# Patient Record
Sex: MALE | Race: WHITE | NOT HISPANIC OR LATINO | Employment: OTHER | URBAN - METROPOLITAN AREA
[De-identification: names, ages, dates, MRNs, and addresses within clinical notes are randomized per-mention and may not be internally consistent; named-entity substitution may affect disease eponyms.]

---

## 2017-02-09 ENCOUNTER — APPOINTMENT (OUTPATIENT)
Dept: CT IMAGING | Facility: CLINIC | Age: 77
End: 2017-02-09
Attending: EMERGENCY MEDICINE
Payer: MEDICARE

## 2017-02-09 ENCOUNTER — HOSPITAL ENCOUNTER (EMERGENCY)
Facility: CLINIC | Age: 77
Discharge: HOME | End: 2017-02-10
Attending: EMERGENCY MEDICINE | Admitting: EMERGENCY MEDICINE
Payer: MEDICARE

## 2017-02-09 VITALS
TEMPERATURE: 97.4 F | SYSTOLIC BLOOD PRESSURE: 143 MMHG | OXYGEN SATURATION: 96 % | HEART RATE: 70 BPM | DIASTOLIC BLOOD PRESSURE: 72 MMHG

## 2017-02-09 DIAGNOSIS — S01.01XA LACERATION OF SCALP, INITIAL ENCOUNTER: ICD-10-CM
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
## 2017-02-09 PROCEDURE — 72125 CT NECK SPINE W/O DYE: CPT

## 2017-02-09 PROCEDURE — 99284 EMERGENCY DEPT VISIT MOD MDM: CPT | Mod: 25

## 2017-02-09 PROCEDURE — 99283 EMERGENCY DEPT VISIT LOW MDM: CPT

## 2017-02-09 PROCEDURE — 99285 EMERGENCY DEPT VISIT HI MDM: CPT | Mod: 25 | Performed by: EMERGENCY MEDICINE

## 2017-02-09 PROCEDURE — 70450 CT HEAD/BRAIN W/O DYE: CPT

## 2017-02-09 PROCEDURE — 12002 RPR S/N/AX/GEN/TRNK2.6-7.5CM: CPT | Performed by: EMERGENCY MEDICINE

## 2017-02-09 PROCEDURE — 12002 RPR S/N/AX/GEN/TRNK2.6-7.5CM: CPT

## 2017-02-09 RX ORDER — LIDOCAINE HYDROCHLORIDE AND EPINEPHRINE 10; 10 MG/ML; UG/ML
1 INJECTION, SOLUTION INFILTRATION; PERINEURAL ONCE
Status: DISCONTINUED | OUTPATIENT
Start: 2017-02-09 | End: 2017-02-10 | Stop reason: HOSPADM

## 2017-02-09 RX ORDER — LIDOCAINE HYDROCHLORIDE AND EPINEPHRINE 10; 10 MG/ML; UG/ML
INJECTION, SOLUTION INFILTRATION; PERINEURAL
Status: DISCONTINUED
Start: 2017-02-09 | End: 2017-02-10 | Stop reason: HOSPADM

## 2017-02-09 ASSESSMENT — ENCOUNTER SYMPTOMS
WOUND: 1
BRUISES/BLEEDS EASILY: 0
WEAKNESS: 0
CONFUSION: 0
VOMITING: 0
ABDOMINAL PAIN: 0
NUMBNESS: 0
CHEST TIGHTNESS: 0
NECK PAIN: 1
PHOTOPHOBIA: 0
BACK PAIN: 0
APPETITE CHANGE: 0
NAUSEA: 0
LIGHT-HEADEDNESS: 0
HEADACHES: 0
DIZZINESS: 0
SHORTNESS OF BREATH: 0

## 2017-02-09 NOTE — ED AVS SNAPSHOT
[facility] Emergency Department    [address]    [facility] [address]    Phone:  [phone]    Fax:  [phone]                                       Bryson Mendez   MRN: [id number]    Department:  [facility] Emergency Department   Date of Visit:  2/9/2017           Patient Information     Date Of Birth          1940        Your diagnoses for this visit were:     Fall, initial encounter     Laceration of scalp, initial encounter        You were seen by Tolu White MD.      Follow-up Information     Follow up with Thien Davison MD. Schedule an appointment as soon as possible for a visit in 10 days.    Specialty:  Family Practice    Why:  For suture removal    Contact information:    [facility]  [address] [id number] [phone]          Discharge Instructions         Mechanical Fall  You have had a fall today. It appears that the cause is  mechanical . That means that you slipped, tripped or lost your balance. If your fall had been due to fainting or a seizure, further tests would be required.  Home Care:  1. Rest today and resume your normal activities when you are feeling back to normal.  2. If you were injured during the fall, follow the advice from your doctor regarding care of your injury.  3. You may use acetaminophen (Tylenol) or ibuprofen (Motrin, Advil) to control pain, unless another pain medicine was prescribed. [NOTE: If you have chronic liver or kidney disease or ever had a stomach ulcer or GI bleeding, talk with your doctor before using these medicines.]  Fall Prevention:    Was there anything that caused your fall that can be fixed, removed, or replaced?    Make your home safe by keeping walkways clear of objects you may trip over.    Use non-slip pads under rugs.    Do not walk in poorly lit areas.    Do not stand on chairs or wobbly ladders.    Use caution when reaching overhead or looking upward. This position can cause a loss  of balance.    Be sure your shoes fit properly, have non-slip bottoms and are in good condition.    Be cautious when going up and down curbs, and walking on uneven sidewalks.    If your balance is poor, consider using a cane or walker.    Stay as active as you can. Balance, flexibility, strength, and endurance all come from exercise. They all play a role in preventing falls.  Follow Up  with your doctor or as advised by our staff.  Get Prompt Medical Attention  if any of the following occur:    Repeated mechanical falls, or unexplained falls    Dizziness, fainting or seizure    Severe headache    Chest pain or shortness of breath    Palpitations (very rapid or very slow or irregular heartbeat)    Blood in vomit, stools (black or red color)    Weakness of an arm or leg or one side of the face    Difficulty with speech or vision    7789-8978 [employer]. [address] [place] [address]. All rights reserved. This information is not intended as a substitute for professional medical care. Always follow your healthcare professional's instructions.          Discharge References/Attachments     LACERATION, SCALP: SUTURES OR STAPLES (ENGLISH)      24 Hour Appointment Hotline       To make an appointment at any [facility], call [phone] ([phone]). If you don't have a family doctor or clinic, we will help you find one. [facility] clinics are conveniently located to serve the needs of you and your family.             Review of your medicines      Our records show that you are taking the medicines listed below. If these are incorrect, please call your family doctor or clinic.        Dose / Directions Last dose taken    ASPIRIN PO   Dose:  81 mg        Take 81 mg by mouth daily   Refills:  0        furosemide 40 MG tablet   Commonly known as:  LASIX   Dose:  40 mg   Quantity:  30 tablet        Take 1 tablet (40 mg) by mouth daily   Refills:  1        losartan 50 MG tablet   Commonly known  as:  COZAAR   Dose:  50 mg        Take 50 mg by mouth daily.   Refills:  0        metoprolol 100 MG tablet   Commonly known as:  LOPRESSOR   Dose:  100 mg   Quantity:  60 tablet        Take 1 tablet (100 mg) by mouth 2 times daily   Refills:  0        * PRESERVISION/LUTEIN PO   Dose:  1 capsule        Take 1 capsule by mouth 2 times daily   Refills:  0        * MULTIVITAL PO   Dose:  1 tablet        Take 1 tablet by mouth daily.   Refills:  0        nitroglycerin 0.4 MG sublingual tablet   Commonly known as:  NITROSTAT   Dose:  0.4 mg   Quantity:  25 tablet        Place 1 tablet (0.4 mg) under the tongue every 5 minutes as needed for chest pain   Refills:  0        NORVASC 5 MG tablet   Dose:  5 mg   Generic drug:  amLODIPine        Take 5 mg by mouth daily.   Refills:  0        OMEGA 3 PO        Refills:  0        OVER-THE-COUNTER   Dose:  2 tablet        2 tablets daily Tebs for eyes.   Refills:  0        VIAGRA 100 MG cap/tab   Generic drug:  sildenafil        Refills:  2        VITAMIN D3 PO   Dose:  5000 Units        Take 5,000 Units by mouth every other day   Refills:  0        ZOCOR 20 MG tablet   Dose:  20 mg   Generic drug:  simvastatin        Take 20 mg by mouth At Bedtime.   Refills:  0        * Notice:  This list has 2 medication(s) that are the same as other medications prescribed for you. Read the directions carefully, and ask your doctor or other care provider to review them with you.            Procedures and tests performed during your visit     CT Head w/o Contrast    Cervical spine CT w/o contrast    Laceration repair      Orders Needing Specimen Collection     None      Pending Results     Date and Time Order Name Status Description    2/9/2017 2316 Cervical spine CT w/o contrast Preliminary     2/9/2017 2316 CT Head w/o Contrast Preliminary             Pending Culture Results     No orders found for last 2 day(s).       Test Results from your hospital stay           2/9/2017 11:44 PM -  Interface, Radiant Ib      Narrative     CT HEAD W/O CONTRAST  2/9/2017 11:34 PM      HISTORY: Fall. Head injury.    TECHNIQUE: Routine noncontrast head CT. Radiation dose for this scan  was reduced using automated exposure control, adjustment of the mA  and/or kV according to patient size, or iterative reconstruction  technique.    COMPARISON: None.    FINDINGS: There is generalized brain atrophy. No mass, mass effect or  intracranial hemorrhage. No evidence of acute infarct. Periventricular  low attenuation is consistent with chronic small vessel ischemic  disease. There is a right parietal scalp hematoma. No fracture.  Ethmoid and sphenoid sinus disease.        Impression     IMPRESSION: No acute brain abnormality.         2/9/2017 11:56 PM - Interface, Radiant Ib      Narrative     CT CERVICAL SPINE W/O CONTRAST  2/9/2017 11:34 PM      HISTORY: Neck pain after fall.    TECHNIQUE: Multiplanar imaging of the cervical spine without  intravenous contrast. Radiation dose for this scan was reduced using  automated exposure control, adjustment of the mA and/or kV according  to patient size, or iterative reconstruction technique.     COMPARISON: None.    FINDINGS: There is no acute fracture or traumatic malalignment. There  is extensive multilevel degenerative disc and facet disease. There are  anterior flowing osteophytes extending from C5-C7. There is mild  spinal stenosis at C3-C4. No other significant spinal stenosis. The  paraspinal soft tissues are remarkable for atherosclerotic  calcifications. Mild scarring at the lung apices.        Impression     IMPRESSION: No acute traumatic abnormality. Multilevel degenerative  disease.                Thank you for choosing [facility]       Thank you for choosing [facility] for your care. Our goal is always to provide you with excellent care. Hearing back from our patients is one way we can continue to improve our services. Please take a few minutes to complete the written  "survey that you may receive in the mail after you visit with us. Thank you!        [employer] Information     [employer] lets you send messages to your doctor, view your test results, renew your prescriptions, schedule appointments and more. To sign up, go to [email or web address] . Click on \"Log in\" on the left side of the screen, which will take you to the Welcome page. Then click on \"Sign up Now\" on the right side of the page.     You will be asked to enter the access code listed below, as well as some personal information. Please follow the directions to create your username and password.     Your access code is: [email or web address]  Expires: 2017 12:14 AM     Your access code will  in 90 days. If you need help or a new code, please call your [facility] clinic or [phone].        Care EveryWhere ID     This is your Care EveryWhere ID. This could be used by other organizations to access your [facility] medical records  [id number]        After Visit Summary       This is your record. Keep this with you and show to your community pharmacist(s) and doctor(s) at your next visit.                  "

## 2017-02-09 NOTE — ED AVS SNAPSHOT
[facility] Emergency Department    [address] [address]    Phone:  [phone]    Fax:  [phone]                                       Bryson Mendez   MRN: [id number]    Department:  [facility] Emergency Department   Date of Visit:  2/9/2017           After Visit Summary Signature Page     I have received my discharge instructions, and my questions have been answered. I have discussed any challenges I see with this plan with the nurse or doctor.    ..........................................................................................................................................  Patient/Patient Representative Signature      ..........................................................................................................................................  Patient Representative Print Name and Relationship to Patient    ..................................................               ................................................  Date                                            Time    ..........................................................................................................................................  Reviewed by Signature/Title    ...................................................              ..............................................  Date                                                            Time

## 2017-02-10 NOTE — DISCHARGE INSTRUCTIONS
Mechanical Fall  You have had a fall today. It appears that the cause is  mechanical . That means that you slipped, tripped or lost your balance. If your fall had been due to fainting or a seizure, further tests would be required.  Home Care:  1. Rest today and resume your normal activities when you are feeling back to normal.  2. If you were injured during the fall, follow the advice from your doctor regarding care of your injury.  3. You may use acetaminophen (Tylenol) or ibuprofen (Motrin, Advil) to control pain, unless another pain medicine was prescribed. [NOTE: If you have chronic liver or kidney disease or ever had a stomach ulcer or GI bleeding, talk with your doctor before using these medicines.]  Fall Prevention:    Was there anything that caused your fall that can be fixed, removed, or replaced?    Make your home safe by keeping walkways clear of objects you may trip over.    Use non-slip pads under rugs.    Do not walk in poorly lit areas.    Do not stand on chairs or wobbly ladders.    Use caution when reaching overhead or looking upward. This position can cause a loss of balance.    Be sure your shoes fit properly, have non-slip bottoms and are in good condition.    Be cautious when going up and down curbs, and walking on uneven sidewalks.    If your balance is poor, consider using a cane or walker.    Stay as active as you can. Balance, flexibility, strength, and endurance all come from exercise. They all play a role in preventing falls.  Follow Up  with your doctor or as advised by our staff.  Get Prompt Medical Attention  if any of the following occur:    Repeated mechanical falls, or unexplained falls    Dizziness, fainting or seizure    Severe headache    Chest pain or shortness of breath    Palpitations (very rapid or very slow or irregular heartbeat)    Blood in vomit, stools (black or red color)    Weakness of an arm or leg or one side of the face    Difficulty with speech or vision     0260-9751 [employer]. [address] [place] [address], [place] [address]. All rights reserved. This information is not intended as a substitute for professional medical care. Always follow your healthcare professional's instructions.

## 2017-02-10 NOTE — ED PROVIDER NOTES
History     Chief Complaint   Patient presents with     Fall     pt fell and hit head on glass table, laceration to top of head, no loc, no neck pain, admits to ETOH tonight,      HPI  Bryson Mendez is a 76 year old male With history of osteoarthritis A. Fib ( on aspirin), and coronary artery disease presents for evaluation of head injury. Patient states he was spending time with friends and had a few drinks tonight when he tripped and fell, striking the back of his head on a table.  No loss of consciousness.  Patient was able to get himself up immediately but did suffer a laceration to the back of his head.  Due to the bleeding, a friend brought him to the emergency department for evaluation.Patient denies headache.  Patient does complain of some mild left-sided neck pain.  Denies chest pain difficulty breathing, abdominal pain, nausea, back pain, hip pain, or knee pain. Patient denies preceding lightheadedness or dizziness    I have reviewed the Medications, Allergies, Past Medical and Surgical History, and Social History in the Epic system.    Review of Systems   Constitutional: Negative for appetite change.   HENT: Negative for congestion.    Eyes: Negative for photophobia and visual disturbance.   Respiratory: Negative for chest tightness and shortness of breath.    Cardiovascular: Negative for chest pain.   Gastrointestinal: Negative for nausea, vomiting and abdominal pain.   Musculoskeletal: Positive for neck pain. Negative for back pain.   Skin: Positive for wound (head laceration).   Neurological: Negative for dizziness, weakness, light-headedness, numbness and headaches.   Hematological: Does not bruise/bleed easily.   Psychiatric/Behavioral: Negative for confusion.   All other systems reviewed and are negative.      Physical Exam   BP: 158/79 mmHg  Pulse: 70  Temp: 97.4  F (36.3  C)  SpO2: 94 %  Physical Exam   Constitutional: He is oriented to person, place, and time. He appears well-developed and  well-nourished. No distress.   HENT:   Head: Normocephalic. Head is with laceration.       Eyes: Conjunctivae and EOM are normal. Pupils are equal, round, and reactive to light.   Neck: No spinous process tenderness present.   Cardiovascular: Normal rate and regular rhythm.    Pulmonary/Chest: Effort normal and breath sounds normal.   Abdominal: Soft.   Musculoskeletal: Normal range of motion. He exhibits no tenderness.   Neurological: He is alert and oriented to person, place, and time.   No slurring speech. Alert and oriented and answering questions appropriately.  No clinical evidence of intoxication at this time   Skin: Skin is warm and dry. He is not diaphoretic.   Psychiatric: He has a normal mood and affect.   Nursing note and vitals reviewed.      ED Course   Laceration repair  Date/Time: 2/9/2017 11:47 PM  Performed by: LORI CORDERO  Authorized by: LORI CORDERO  Consent: Verbal consent obtained.  Risks and benefits: risks, benefits and alternatives were discussed  Consent given by: patient  Body area: head/neck  Location details: scalp  Laceration length: 5 cm  Foreign bodies: no foreign bodies  Tendon involvement: none  Nerve involvement: none  Vascular damage: no  Anesthesia: local infiltration  Local anesthetic: lidocaine 1% with epinephrine  Anesthetic total: 3 ml  Patient sedated: no  Preparation: Patient was prepped and draped in the usual sterile fashion.  Irrigation solution: saline  Irrigation method: syringe  Amount of cleaning: standard  Debridement: none  Degree of undermining: none  Skin closure: 5-0 nylon  Number of sutures: 6  Technique: simple  Approximation: close  Approximation difficulty: simple  Dressing: antibiotic ointment  Patient tolerance: Patient tolerated the procedure well with no immediate complications                     Labs Ordered and Resulted from Time of ED Arrival Up to the Time of Departure from the ED - No data to display    Assessments & Plan  (with Medical Decision Making)  6-year-old male with history of A. Fib on aspirin presents for evaluation after a mechanical fall with laceration to the back of the scalp.No loss of consciousness. No headache. Given patient's age and use of chronic aspirin, as well as his recent use of alcohol, a CT of the head and cervical spine were performed to evaluate for underlying bleeding or fracture: CT showed multilevel degenerative changes in the cervical spine without acute frcture. CT head without acute fracture. Laceration repaired as above.  Patient advised to follow-up in 7-10 days for suture removal.     I have reviewed the nursing notes.    I have reviewed the findings, diagnosis, plan and need for follow up with the patient.    New Prescriptions    No medications on file       Final diagnoses:   Fall, initial encounter   Laceration of scalp, initial encounter       2/9/2017   [facility] EMERGENCY DEPARTMENT      White, Tolu Palacio MD  02/10/17 0020

## 2017-02-10 NOTE — ED NOTES
Pt states he tripped and fell onto table hitting back of head - denies LOC - has lac to occ - bleeding controlled prior to arrival - admits to having friends over this evening and drinking etoh - hx of knee problems and hip issues . Denies any headache or nausea - does admit to slight discomfort right shoulder radiating to base of neck - declines collar at this time.; Patient encouraged not to move neck.

## 2017-02-10 NOTE — ED NOTES
pt fell and hit head on glass table, laceration to top of head, no loc, no neck pain, admits to ETOH tonight,

## 2017-11-13 ENCOUNTER — TRANSFERRED RECORDS (OUTPATIENT)
Dept: HEALTH INFORMATION MANAGEMENT | Facility: CLINIC | Age: 77
End: 2017-11-13

## 2018-01-14 ENCOUNTER — TRANSFERRED RECORDS (OUTPATIENT)
Dept: HEALTH INFORMATION MANAGEMENT | Facility: CLINIC | Age: 78
End: 2018-01-14

## 2018-01-16 RX ORDER — MELOXICAM 15 MG/1
15 TABLET ORAL DAILY
Status: ON HOLD | COMMUNITY
Start: 2016-11-08 | End: 2018-04-25

## 2018-01-16 RX ORDER — OMEPRAZOLE 40 MG/1
40 CAPSULE, DELAYED RELEASE ORAL DAILY
Status: ON HOLD | COMMUNITY
Start: 2016-09-26 | End: 2020-09-06

## 2018-01-16 RX ORDER — OXYBUTYNIN CHLORIDE 5 MG/1
10 TABLET, EXTENDED RELEASE ORAL DAILY
COMMUNITY
Start: 2017-01-31 | End: 2020-09-05

## 2018-01-17 ENCOUNTER — ANESTHESIA EVENT (OUTPATIENT)
Dept: SURGERY | Facility: CLINIC | Age: 78
DRG: 467 | End: 2018-01-17
Payer: MEDICARE

## 2018-01-18 ASSESSMENT — LIFESTYLE VARIABLES: TOBACCO_USE: 1
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
## 2018-01-19 ENCOUNTER — HOSPITAL ENCOUNTER (INPATIENT)
Facility: CLINIC | Age: 78
LOS: 6 days | Discharge: HOME | DRG: 467 | End: 2018-01-25
Attending: ORTHOPAEDIC SURGERY | Admitting: ORTHOPAEDIC SURGERY
Payer: MEDICARE

## 2018-01-19 ENCOUNTER — APPOINTMENT (OUTPATIENT)
Dept: GENERAL RADIOLOGY | Facility: CLINIC | Age: 78
DRG: 467 | End: 2018-01-19
Attending: ORTHOPAEDIC SURGERY
Payer: MEDICARE

## 2018-01-19 ENCOUNTER — ANESTHESIA (OUTPATIENT)
Dept: SURGERY | Facility: CLINIC | Age: 78
DRG: 467 | End: 2018-01-19
Payer: MEDICARE

## 2018-01-19 DIAGNOSIS — Z96.649 S/P REVISION OF TOTAL HIP: Primary | ICD-10-CM

## 2018-01-19 DIAGNOSIS — I25.119 CORONARY ARTERY DISEASE INVOLVING NATIVE HEART WITH ANGINA PECTORIS, UNSPECIFIED VESSEL OR LESION TYPE (H): Chronic | ICD-10-CM

## 2018-01-19 PROBLEM — I10 BENIGN ESSENTIAL HYPERTENSION: Status: ACTIVE | Noted: 2018-01-19

## 2018-01-19 PROBLEM — T84.018A FAILED TOTAL HIP ARTHROPLASTY (H): Status: ACTIVE | Noted: 2018-01-19

## 2018-01-19 LAB
CREAT SERPL-MCNC: 0.68 MG/DL (ref 0.66–1.25)
GFR SERPL CREATININE-BSD FRML MDRD: >90 ML/MIN/1.7M2
PLATELET # BLD AUTO: 118 10E9/L (ref 150–450)

## 2018-01-19 PROCEDURE — 0SRR01A REPLACEMENT OF RIGHT HIP JOINT, FEMORAL SURFACE WITH METAL SYNTHETIC SUBSTITUTE, UNCEMENTED, OPEN APPROACH: ICD-10-PCS | Performed by: ORTHOPAEDIC SURGERY

## 2018-01-19 PROCEDURE — 85049 AUTOMATED PLATELET COUNT: CPT | Performed by: ORTHOPAEDIC SURGERY

## 2018-01-19 PROCEDURE — 12000000 ZZH R&B MED SURG/OB

## 2018-01-19 PROCEDURE — 25000132 ZZH RX MED GY IP 250 OP 250 PS 637: Mod: GY | Performed by: FAMILY MEDICINE

## 2018-01-19 PROCEDURE — 27110028 ZZH OR GENERAL SUPPLY NON-STERILE: Performed by: ORTHOPAEDIC SURGERY

## 2018-01-19 PROCEDURE — 25000128 H RX IP 250 OP 636: Performed by: NURSE ANESTHETIST, CERTIFIED REGISTERED

## 2018-01-19 PROCEDURE — 25000566 ZZH SEVOFLURANE, EA 15 MIN: Performed by: ORTHOPAEDIC SURGERY

## 2018-01-19 PROCEDURE — 25000125 ZZHC RX 250: Performed by: ORTHOPAEDIC SURGERY

## 2018-01-19 PROCEDURE — 25000125 ZZHC RX 250: Performed by: NURSE ANESTHETIST, CERTIFIED REGISTERED

## 2018-01-19 PROCEDURE — A9270 NON-COVERED ITEM OR SERVICE: HCPCS | Mod: GY | Performed by: ORTHOPAEDIC SURGERY

## 2018-01-19 PROCEDURE — 37000009 ZZH ANESTHESIA TECHNICAL FEE, EACH ADDTL 15 MIN: Performed by: ORTHOPAEDIC SURGERY

## 2018-01-19 PROCEDURE — 36000069 ZZH SURGERY LEVEL 5 EA 15 ADDTL MIN: Performed by: ORTHOPAEDIC SURGERY

## 2018-01-19 PROCEDURE — 40000986 XR PELVIS PORT 1/2 VW

## 2018-01-19 PROCEDURE — 36000067 ZZH SURGERY LEVEL 5 1ST 30 MIN: Performed by: ORTHOPAEDIC SURGERY

## 2018-01-19 PROCEDURE — 25000128 H RX IP 250 OP 636: Performed by: ORTHOPAEDIC SURGERY

## 2018-01-19 PROCEDURE — C1776 JOINT DEVICE (IMPLANTABLE): HCPCS | Performed by: ORTHOPAEDIC SURGERY

## 2018-01-19 PROCEDURE — 25000132 ZZH RX MED GY IP 250 OP 250 PS 637: Mod: GY | Performed by: PHYSICIAN ASSISTANT

## 2018-01-19 PROCEDURE — 82565 ASSAY OF CREATININE: CPT | Performed by: ORTHOPAEDIC SURGERY

## 2018-01-19 PROCEDURE — A9270 NON-COVERED ITEM OR SERVICE: HCPCS | Mod: GY | Performed by: PHYSICIAN ASSISTANT

## 2018-01-19 PROCEDURE — 71000012 ZZH RECOVERY PHASE 1 LEVEL 1 FIRST HR: Performed by: ORTHOPAEDIC SURGERY

## 2018-01-19 PROCEDURE — 36415 COLL VENOUS BLD VENIPUNCTURE: CPT | Performed by: ORTHOPAEDIC SURGERY

## 2018-01-19 PROCEDURE — 0SUA09Z SUPPLEMENT RIGHT HIP JOINT, ACETABULAR SURFACE WITH LINER, OPEN APPROACH: ICD-10-PCS | Performed by: ORTHOPAEDIC SURGERY

## 2018-01-19 PROCEDURE — 25000132 ZZH RX MED GY IP 250 OP 250 PS 637: Mod: GY | Performed by: ORTHOPAEDIC SURGERY

## 2018-01-19 PROCEDURE — 27210995 ZZH RX 272: Performed by: ORTHOPAEDIC SURGERY

## 2018-01-19 PROCEDURE — A9270 NON-COVERED ITEM OR SERVICE: HCPCS | Mod: GY | Performed by: FAMILY MEDICINE

## 2018-01-19 PROCEDURE — 27210794 ZZH OR GENERAL SUPPLY STERILE: Performed by: ORTHOPAEDIC SURGERY

## 2018-01-19 PROCEDURE — 0SP909Z REMOVAL OF LINER FROM RIGHT HIP JOINT, OPEN APPROACH: ICD-10-PCS | Performed by: ORTHOPAEDIC SURGERY

## 2018-01-19 PROCEDURE — 40000305 ZZH STATISTIC PRE PROC ASSESS I: Performed by: ORTHOPAEDIC SURGERY

## 2018-01-19 PROCEDURE — 25000128 H RX IP 250 OP 636: Performed by: PHYSICIAN ASSISTANT

## 2018-01-19 PROCEDURE — 37000008 ZZH ANESTHESIA TECHNICAL FEE, 1ST 30 MIN: Performed by: ORTHOPAEDIC SURGERY

## 2018-01-19 PROCEDURE — 0SPR0JZ REMOVAL OF SYNTHETIC SUBSTITUTE FROM RIGHT HIP JOINT, FEMORAL SURFACE, OPEN APPROACH: ICD-10-PCS | Performed by: ORTHOPAEDIC SURGERY

## 2018-01-19 PROCEDURE — 25800025 ZZH RX 258: Performed by: ORTHOPAEDIC SURGERY

## 2018-01-19 DEVICE — IMP HEAD FEMORAL DEPUY 36MM +5 1365-52-000: Type: IMPLANTABLE DEVICE | Site: HIP | Status: FUNCTIONAL

## 2018-01-19 DEVICE — IMPLANTABLE DEVICE: Type: IMPLANTABLE DEVICE | Site: HIP | Status: FUNCTIONAL

## 2018-01-19 DEVICE — IMP INSERT HIP DEPUY PINNACLE ALTRX 36X52MM 1221-36-052: Type: IMPLANTABLE DEVICE | Site: HIP | Status: FUNCTIONAL

## 2018-01-19 RX ORDER — ACETAMINOPHEN 325 MG/1
975 TABLET ORAL EVERY 8 HOURS
Status: DISPENSED | OUTPATIENT
Start: 2018-01-19 | End: 2018-01-22

## 2018-01-19 RX ORDER — LIDOCAINE HYDROCHLORIDE 10 MG/ML
INJECTION, SOLUTION INFILTRATION; PERINEURAL PRN
Status: DISCONTINUED | OUTPATIENT
Start: 2018-01-19 | End: 2018-01-19

## 2018-01-19 RX ORDER — DEXTROSE MONOHYDRATE, SODIUM CHLORIDE, AND POTASSIUM CHLORIDE 50; 1.49; 4.5 G/1000ML; G/1000ML; G/1000ML
INJECTION, SOLUTION INTRAVENOUS CONTINUOUS
Status: DISCONTINUED | OUTPATIENT
Start: 2018-01-19 | End: 2018-01-20 | Stop reason: CLARIF

## 2018-01-19 RX ORDER — EPINEPHRINE 1 MG/ML
INJECTION, SOLUTION, CONCENTRATE INTRAVENOUS PRN
Status: DISCONTINUED | OUTPATIENT
Start: 2018-01-19 | End: 2018-01-19

## 2018-01-19 RX ORDER — NALOXONE HYDROCHLORIDE 0.4 MG/ML
.1-.4 INJECTION, SOLUTION INTRAMUSCULAR; INTRAVENOUS; SUBCUTANEOUS
Status: DISCONTINUED | OUTPATIENT
Start: 2018-01-19 | End: 2018-01-25 | Stop reason: HOSPADM

## 2018-01-19 RX ORDER — NEOSTIGMINE METHYLSULFATE 1 MG/ML
VIAL (ML) INJECTION PRN
Status: DISCONTINUED | OUTPATIENT
Start: 2018-01-19 | End: 2018-01-19

## 2018-01-19 RX ORDER — GLYCOPYRROLATE 0.2 MG/ML
INJECTION, SOLUTION INTRAMUSCULAR; INTRAVENOUS PRN
Status: DISCONTINUED | OUTPATIENT
Start: 2018-01-19 | End: 2018-01-19

## 2018-01-19 RX ORDER — FENTANYL CITRATE 50 UG/ML
INJECTION, SOLUTION INTRAMUSCULAR; INTRAVENOUS PRN
Status: DISCONTINUED | OUTPATIENT
Start: 2018-01-19 | End: 2018-01-19

## 2018-01-19 RX ORDER — PROCHLORPERAZINE MALEATE 5 MG
5 TABLET ORAL EVERY 6 HOURS PRN
Status: DISCONTINUED | OUTPATIENT
Start: 2018-01-19 | End: 2018-01-25 | Stop reason: HOSPADM

## 2018-01-19 RX ORDER — LIDOCAINE 40 MG/G
CREAM TOPICAL
Status: DISCONTINUED | OUTPATIENT
Start: 2018-01-19 | End: 2018-01-19 | Stop reason: HOSPADM

## 2018-01-19 RX ORDER — HYDROXYZINE HYDROCHLORIDE 50 MG/1
50 TABLET, FILM COATED ORAL
Status: DISCONTINUED | OUTPATIENT
Start: 2018-01-19 | End: 2018-01-19 | Stop reason: HOSPADM

## 2018-01-19 RX ORDER — NALOXONE HYDROCHLORIDE 0.4 MG/ML
.1-.4 INJECTION, SOLUTION INTRAMUSCULAR; INTRAVENOUS; SUBCUTANEOUS
Status: DISCONTINUED | OUTPATIENT
Start: 2018-01-19 | End: 2018-01-19

## 2018-01-19 RX ORDER — AMOXICILLIN 250 MG
1-2 CAPSULE ORAL 2 TIMES DAILY
Status: DISCONTINUED | OUTPATIENT
Start: 2018-01-19 | End: 2018-01-25 | Stop reason: HOSPADM

## 2018-01-19 RX ORDER — KETOROLAC TROMETHAMINE 30 MG/ML
INJECTION, SOLUTION INTRAMUSCULAR; INTRAVENOUS PRN
Status: DISCONTINUED | OUTPATIENT
Start: 2018-01-19 | End: 2018-01-19

## 2018-01-19 RX ORDER — SODIUM CHLORIDE, SODIUM LACTATE, POTASSIUM CHLORIDE, CALCIUM CHLORIDE 600; 310; 30; 20 MG/100ML; MG/100ML; MG/100ML; MG/100ML
INJECTION, SOLUTION INTRAVENOUS CONTINUOUS
Status: DISCONTINUED | OUTPATIENT
Start: 2018-01-19 | End: 2018-01-19 | Stop reason: HOSPADM

## 2018-01-19 RX ORDER — ONDANSETRON 2 MG/ML
4 INJECTION INTRAMUSCULAR; INTRAVENOUS EVERY 30 MIN PRN
Status: DISCONTINUED | OUTPATIENT
Start: 2018-01-19 | End: 2018-01-19 | Stop reason: HOSPADM

## 2018-01-19 RX ORDER — MEPERIDINE HYDROCHLORIDE 25 MG/ML
12.5 INJECTION INTRAMUSCULAR; INTRAVENOUS; SUBCUTANEOUS EVERY 5 MIN PRN
Status: DISCONTINUED | OUTPATIENT
Start: 2018-01-19 | End: 2018-01-19 | Stop reason: HOSPADM

## 2018-01-19 RX ORDER — ONDANSETRON 4 MG/1
4 TABLET, ORALLY DISINTEGRATING ORAL EVERY 30 MIN PRN
Status: DISCONTINUED | OUTPATIENT
Start: 2018-01-19 | End: 2018-01-19 | Stop reason: HOSPADM

## 2018-01-19 RX ORDER — HYDROMORPHONE HYDROCHLORIDE 1 MG/ML
.3-.5 INJECTION, SOLUTION INTRAMUSCULAR; INTRAVENOUS; SUBCUTANEOUS
Status: DISCONTINUED | OUTPATIENT
Start: 2018-01-19 | End: 2018-01-24

## 2018-01-19 RX ORDER — ONDANSETRON 2 MG/ML
INJECTION INTRAMUSCULAR; INTRAVENOUS PRN
Status: DISCONTINUED | OUTPATIENT
Start: 2018-01-19 | End: 2018-01-19

## 2018-01-19 RX ORDER — FENTANYL CITRATE 50 UG/ML
25-50 INJECTION, SOLUTION INTRAMUSCULAR; INTRAVENOUS
Status: DISCONTINUED | OUTPATIENT
Start: 2018-01-19 | End: 2018-01-19 | Stop reason: HOSPADM

## 2018-01-19 RX ORDER — HYDROMORPHONE HYDROCHLORIDE 1 MG/ML
.3-.5 INJECTION, SOLUTION INTRAMUSCULAR; INTRAVENOUS; SUBCUTANEOUS EVERY 5 MIN PRN
Status: DISCONTINUED | OUTPATIENT
Start: 2018-01-19 | End: 2018-01-19 | Stop reason: HOSPADM

## 2018-01-19 RX ORDER — ACETAMINOPHEN 325 MG/1
650 TABLET ORAL EVERY 4 HOURS PRN
Status: DISCONTINUED | OUTPATIENT
Start: 2018-01-22 | End: 2018-01-25 | Stop reason: HOSPADM

## 2018-01-19 RX ORDER — ONDANSETRON 2 MG/ML
4 INJECTION INTRAMUSCULAR; INTRAVENOUS EVERY 6 HOURS PRN
Status: DISCONTINUED | OUTPATIENT
Start: 2018-01-19 | End: 2018-01-25 | Stop reason: HOSPADM

## 2018-01-19 RX ORDER — CEFAZOLIN SODIUM 2 G/100ML
2 INJECTION, SOLUTION INTRAVENOUS
Status: COMPLETED | OUTPATIENT
Start: 2018-01-19 | End: 2018-01-19

## 2018-01-19 RX ORDER — CEFAZOLIN SODIUM 2 G/100ML
2 INJECTION, SOLUTION INTRAVENOUS EVERY 8 HOURS
Status: COMPLETED | OUTPATIENT
Start: 2018-01-19 | End: 2018-01-20

## 2018-01-19 RX ORDER — HYDROMORPHONE HYDROCHLORIDE 2 MG/1
2-4 TABLET ORAL EVERY 4 HOURS PRN
Status: DISCONTINUED | OUTPATIENT
Start: 2018-01-19 | End: 2018-01-25 | Stop reason: HOSPADM

## 2018-01-19 RX ORDER — LIDOCAINE 40 MG/G
CREAM TOPICAL
Status: DISCONTINUED | OUTPATIENT
Start: 2018-01-19 | End: 2018-01-25 | Stop reason: HOSPADM

## 2018-01-19 RX ORDER — LOSARTAN POTASSIUM 50 MG/1
50 TABLET ORAL DAILY
Status: DISCONTINUED | OUTPATIENT
Start: 2018-01-19 | End: 2018-01-25 | Stop reason: HOSPADM

## 2018-01-19 RX ORDER — AMLODIPINE BESYLATE 5 MG/1
5 TABLET ORAL DAILY
Status: DISCONTINUED | OUTPATIENT
Start: 2018-01-20 | End: 2018-01-20

## 2018-01-19 RX ORDER — PROPOFOL 10 MG/ML
INJECTION, EMULSION INTRAVENOUS PRN
Status: DISCONTINUED | OUTPATIENT
Start: 2018-01-19 | End: 2018-01-19

## 2018-01-19 RX ORDER — OXYCODONE HCL 10 MG/1
10 TABLET, FILM COATED, EXTENDED RELEASE ORAL ONCE
Status: COMPLETED | OUTPATIENT
Start: 2018-01-19 | End: 2018-01-19

## 2018-01-19 RX ORDER — OXYBUTYNIN CHLORIDE 5 MG/1
10 TABLET, EXTENDED RELEASE ORAL DAILY
Status: DISCONTINUED | OUTPATIENT
Start: 2018-01-20 | End: 2018-01-25 | Stop reason: HOSPADM

## 2018-01-19 RX ORDER — DEXAMETHASONE SODIUM PHOSPHATE 4 MG/ML
INJECTION, SOLUTION INTRA-ARTICULAR; INTRALESIONAL; INTRAMUSCULAR; INTRAVENOUS; SOFT TISSUE PRN
Status: DISCONTINUED | OUTPATIENT
Start: 2018-01-19 | End: 2018-01-19

## 2018-01-19 RX ORDER — HYDROXYZINE HYDROCHLORIDE 10 MG/1
10 TABLET, FILM COATED ORAL EVERY 6 HOURS PRN
Status: DISCONTINUED | OUTPATIENT
Start: 2018-01-19 | End: 2018-01-25 | Stop reason: HOSPADM

## 2018-01-19 RX ORDER — METOPROLOL TARTRATE 100 MG
100 TABLET ORAL 2 TIMES DAILY
Status: DISCONTINUED | OUTPATIENT
Start: 2018-01-19 | End: 2018-01-20

## 2018-01-19 RX ORDER — BUPIVACAINE HYDROCHLORIDE 7.5 MG/ML
INJECTION, SOLUTION INTRASPINAL PRN
Status: DISCONTINUED | OUTPATIENT
Start: 2018-01-19 | End: 2018-01-19

## 2018-01-19 RX ORDER — SIMVASTATIN 20 MG
20 TABLET ORAL AT BEDTIME
Status: DISCONTINUED | OUTPATIENT
Start: 2018-01-19 | End: 2018-01-25 | Stop reason: HOSPADM

## 2018-01-19 RX ORDER — GABAPENTIN 100 MG/1
100 CAPSULE ORAL
Status: COMPLETED | OUTPATIENT
Start: 2018-01-19 | End: 2018-01-19

## 2018-01-19 RX ORDER — EPHEDRINE SULFATE 50 MG/ML
INJECTION, SOLUTION INTRAVENOUS PRN
Status: DISCONTINUED | OUTPATIENT
Start: 2018-01-19 | End: 2018-01-19

## 2018-01-19 RX ORDER — ONDANSETRON 4 MG/1
4 TABLET, ORALLY DISINTEGRATING ORAL EVERY 6 HOURS PRN
Status: DISCONTINUED | OUTPATIENT
Start: 2018-01-19 | End: 2018-01-25 | Stop reason: HOSPADM

## 2018-01-19 RX ORDER — HYDROMORPHONE HYDROCHLORIDE 2 MG/1
2-4 TABLET ORAL
Status: DISCONTINUED | OUTPATIENT
Start: 2018-01-19 | End: 2018-01-19

## 2018-01-19 RX ADMIN — ONDANSETRON 4 MG: 2 INJECTION INTRAMUSCULAR; INTRAVENOUS at 15:54

## 2018-01-19 RX ADMIN — FENTANYL CITRATE 100 MCG: 50 INJECTION, SOLUTION INTRAMUSCULAR; INTRAVENOUS at 15:06

## 2018-01-19 RX ADMIN — LIDOCAINE HYDROCHLORIDE 5 ML: 10 INJECTION, SOLUTION INFILTRATION; PERINEURAL at 15:06

## 2018-01-19 RX ADMIN — DEXAMETHASONE SODIUM PHOSPHATE 4 MG: 4 INJECTION, SOLUTION INTRA-ARTICULAR; INTRALESIONAL; INTRAMUSCULAR; INTRAVENOUS; SOFT TISSUE at 15:06

## 2018-01-19 RX ADMIN — PHENYLEPHRINE HYDROCHLORIDE 100 MCG: 10 INJECTION, SOLUTION INTRAMUSCULAR; INTRAVENOUS; SUBCUTANEOUS at 15:33

## 2018-01-19 RX ADMIN — SODIUM CHLORIDE, POTASSIUM CHLORIDE, SODIUM LACTATE AND CALCIUM CHLORIDE: 600; 310; 30; 20 INJECTION, SOLUTION INTRAVENOUS at 15:31

## 2018-01-19 RX ADMIN — KETOROLAC TROMETHAMINE 15 MG: 30 INJECTION, SOLUTION INTRAMUSCULAR at 15:54

## 2018-01-19 RX ADMIN — CEFAZOLIN SODIUM 2 G: 2 INJECTION, SOLUTION INTRAVENOUS at 23:02

## 2018-01-19 RX ADMIN — EPINEPHRINE 0.2 MG: 1 INJECTION, SOLUTION INTRAMUSCULAR; SUBCUTANEOUS at 15:00

## 2018-01-19 RX ADMIN — PHENYLEPHRINE HYDROCHLORIDE 200 MCG: 10 INJECTION, SOLUTION INTRAMUSCULAR; INTRAVENOUS; SUBCUTANEOUS at 15:46

## 2018-01-19 RX ADMIN — EPHEDRINE SULFATE 5 MG: 50 INJECTION, SOLUTION INTRAVENOUS at 15:46

## 2018-01-19 RX ADMIN — GLYCOPYRROLATE 0.4 MG: 0.2 INJECTION, SOLUTION INTRAMUSCULAR; INTRAVENOUS at 15:15

## 2018-01-19 RX ADMIN — HYDROMORPHONE HYDROCHLORIDE 4 MG: 2 TABLET ORAL at 22:59

## 2018-01-19 RX ADMIN — SENNOSIDES AND DOCUSATE SODIUM 1 TABLET: 8.6; 5 TABLET ORAL at 22:59

## 2018-01-19 RX ADMIN — HYDROMORPHONE HYDROCHLORIDE 2 MG: 2 TABLET ORAL at 19:17

## 2018-01-19 RX ADMIN — HYDROMORPHONE HYDROCHLORIDE 0.5 MG: 1 INJECTION, SOLUTION INTRAMUSCULAR; INTRAVENOUS; SUBCUTANEOUS at 16:01

## 2018-01-19 RX ADMIN — Medication 4 MG: at 16:01

## 2018-01-19 RX ADMIN — CEFAZOLIN SODIUM 2 G: 2 INJECTION, SOLUTION INTRAVENOUS at 14:25

## 2018-01-19 RX ADMIN — GLYCOPYRROLATE 0.6 MG: 0.2 INJECTION, SOLUTION INTRAMUSCULAR; INTRAVENOUS at 16:01

## 2018-01-19 RX ADMIN — ACETAMINOPHEN 975 MG: 325 TABLET, FILM COATED ORAL at 18:33

## 2018-01-19 RX ADMIN — POTASSIUM CHLORIDE, DEXTROSE MONOHYDRATE AND SODIUM CHLORIDE: 150; 5; 450 INJECTION, SOLUTION INTRAVENOUS at 17:57

## 2018-01-19 RX ADMIN — HYDROMORPHONE HYDROCHLORIDE 1 MG: 1 INJECTION, SOLUTION INTRAMUSCULAR; INTRAVENOUS; SUBCUTANEOUS at 15:15

## 2018-01-19 RX ADMIN — PROPOFOL 200 MG: 10 INJECTION, EMULSION INTRAVENOUS at 15:06

## 2018-01-19 RX ADMIN — SODIUM CHLORIDE, POTASSIUM CHLORIDE, SODIUM LACTATE AND CALCIUM CHLORIDE: 600; 310; 30; 20 INJECTION, SOLUTION INTRAVENOUS at 12:48

## 2018-01-19 RX ADMIN — OXYCODONE HYDROCHLORIDE 10 MG: 10 TABLET, FILM COATED, EXTENDED RELEASE ORAL at 12:54

## 2018-01-19 RX ADMIN — OMEPRAZOLE 40 MG: 20 CAPSULE, DELAYED RELEASE ORAL at 22:58

## 2018-01-19 RX ADMIN — GLYCOPYRROLATE 0.2 MG: 0.2 INJECTION, SOLUTION INTRAMUSCULAR; INTRAVENOUS at 14:30

## 2018-01-19 RX ADMIN — BUPIVACAINE HYDROCHLORIDE IN DEXTROSE 1.6 ML: 7.5 INJECTION, SOLUTION SUBARACHNOID at 15:00

## 2018-01-19 RX ADMIN — MIDAZOLAM 2 MG: 1 INJECTION INTRAMUSCULAR; INTRAVENOUS at 14:25

## 2018-01-19 RX ADMIN — LIDOCAINE HYDROCHLORIDE 5 ML: 10 INJECTION, SOLUTION EPIDURAL; INFILTRATION; INTRACAUDAL; PERINEURAL at 12:49

## 2018-01-19 RX ADMIN — PHENYLEPHRINE HYDROCHLORIDE 150 MCG: 10 INJECTION, SOLUTION INTRAMUSCULAR; INTRAVENOUS; SUBCUTANEOUS at 15:39

## 2018-01-19 RX ADMIN — LIDOCAINE HYDROCHLORIDE 2 ML: 10 INJECTION, SOLUTION INFILTRATION; PERINEURAL at 14:54

## 2018-01-19 RX ADMIN — GABAPENTIN 100 MG: 100 CAPSULE ORAL at 12:57

## 2018-01-19 RX ADMIN — ROCURONIUM BROMIDE 40 MG: 10 INJECTION INTRAVENOUS at 15:06

## 2018-01-19 RX ADMIN — METOPROLOL TARTRATE 100 MG: 100 TABLET, FILM COATED ORAL at 22:59

## 2018-01-19 ASSESSMENT — PAIN DESCRIPTION - DESCRIPTORS: DESCRIPTORS: ACHING

## 2018-01-19 NOTE — IP AVS SNAPSHOT
[facility]    [address] [facility] [address]    Phone:  [phone]    Fax:  [phone]                                       After Visit Summary   1/19/2018    Bryson Mendze    MRN: [id number]           After Visit Summary Signature Page     I have received my discharge instructions, and my questions have been answered. I have discussed any challenges I see with this plan with the nurse or doctor.    ..........................................................................................................................................  Patient/Patient Representative Signature      ..........................................................................................................................................  Patient Representative Print Name and Relationship to Patient    ..................................................               ................................................  Date                                            Time    ..........................................................................................................................................  Reviewed by Signature/Title    ...................................................              ..............................................  Date                                                            Time

## 2018-01-19 NOTE — BRIEF OP NOTE
[facility]  Brief Operative Note      Pre-operative diagnosis: failed total hip arthroplasty   Post-operative diagnosis: Same   Procedure: Total hip arthoplasty (Right)   Surgeon: David Reich MD     Assistant(s): Raghav OMER   Anesthesia: General endotracheal anesthesia and Spinal Anesthesia   Estimated blood loss: Less than 100 ml               Drains: None   Specimens: None       Findings: See full dictated operative note for details   Complications: None                   Comments: See dictated operative report for full details     Condition: Stable   Weight bearing status: Weight bearing as tolerated   Activity: Activity as tolerated  Patient may move about with assist as indicated or with supervision   Anticoagulation plan:                 Lovenox inpatient and then  mg daily at discharge  for 42 days  Follow up plan                           Follow up in 2 week(s)

## 2018-01-19 NOTE — ANESTHESIA POSTPROCEDURE EVALUATION
Patient: Bryson Mendez    Procedure(s):  Right Hip Acetabulum Revision - Wound Class: I-Clean    Diagnosis:failed total hip arthroplasty  Diagnosis Additional Information: No value filed.    Anesthesia Type:  Spinal    Note:  Anesthesia Post Evaluation    Patient location during evaluation: Bedside  Patient participation: Able to fully participate in evaluation  Level of consciousness: awake and alert  Pain management: adequate  Airway patency: patent  Cardiovascular status: acceptable  Respiratory status: acceptable  Hydration status: acceptable  PONV: none     Anesthetic complications: None          Last vitals:  Vitals:    01/19/18 1633 01/19/18 1645 01/19/18 1659   BP: 136/90 147/70 147/75   Resp: 16 16 16   Temp: 36.7  C (98  F)     SpO2: 97% 94% 96%         Electronically Signed By: ZOILA Serrano CRNA  January 19, 2018  5:23 PM

## 2018-01-19 NOTE — PROGRESS NOTES
"SPIRITUAL HEALTH SERVICES  SPIRITUAL ASSESSMENT Progress Note  [facility] [place] Pre-[place]    Pt, Romeo, had requested  visit prior to ROMAN.  He stated he \"wasn't too worried\" about the surgery and was trusting that Dr Harrison had a good plan in mind for him.  Romeo talked about living in the [place], [place] area since 1990 and that he attended  [employer] - a [employer].  I offered a prayer for a successful surgery, for Romeo to sense God's peace and for a good rehab process.  He expressed appreciation for the support and for the [facility] at [facility].    Lopez Rodrigues M.A., [facility]  Staff   [facility]  Office: [phone]  Cell: [phone]  Pager [phone]    "

## 2018-01-19 NOTE — IP AVS SNAPSHOT
MRN:[id number]                      After Visit Summary   1/19/2018    Bryson Mendez    MRN: [id number]           Thank you!     Thank you for choosing [facility] for your care. Our goal is always to provide you with excellent care. Hearing back from our patients is one way we can continue to improve our services. Please take a few minutes to complete the written survey that you may receive in the mail after you visit with us. Thank you!        Patient Information     Date Of Birth          1940        Designated Caregiver       Most Recent Value    Caregiver    Will someone help with your care after discharge? yes    Name of designated caregiver Toya Mendez    Phone number of caregiver [phone]    Caregiver address same      About your hospital stay     You were admitted on:  January 19, 2018 You last received care in the:  [facility]    You were discharged on:  January 25, 2018        Reason for your hospital stay       Right acetabulum revision            Reason for your hospital stay       Right hip replacement.    Post operative ileus.    Chest pain necessitating stress testing.                  Who to Call     For medical emergencies, please call 911.  For non-urgent questions about your medical care, please call your primary care provider or clinic, [phone]  For questions related to your surgery, please call your surgery clinic        Attending Provider     Provider Specialty    David Reich MD Orthopaedic Surgery    Vonda Condon MD Internal Medicine    [facility]       Primary Care Provider Office Phone # Fax #    Thien Davison [phone]       When to contact your care team       Call your Orthopedic surgeon at [facility]  if you have any of the following: temperature greater than 100.4,  increased shortness of breath, increased drainage, increased swelling or increased pain.                   After Care Instructions     Activity       Your activity upon discharge: Activity as tolerated, no driving until off narcotic pain medication.            Activity       Your activity upon discharge: activity as tolerated            Diet       Follow this diet upon discharge: Orders Placed This Encounter      NPO for Medical/Clinical Reasons Except for: Meds              Diet       Follow this diet upon discharge: Regular diet            Wound care and dressings       Instructions to care for your wound at home: as directed, daily dressing changes, ice to area for comfort, keep wound clean and dry and may get incision wet in shower but do not soak or scrub.                  Follow-up Appointments     Follow-up and recommended labs and tests       Follow up with  Bettina Diaz PA-C , at  [facility], within 2 weeks to evaluate after surgery and for hospital follow- up.            Follow-up and recommended labs and tests        Follow up with primary care provider, Thien Davison, within 7 days for hospital follow- up.  No follow up labs or test are needed.                  Additional Services     Physical Therapy Referral       *This therapy referral will be filtered to a centralized scheduling office at [facility] and the patient will receive a call to schedule an appointment at a [place] location most convenient for them. *     [facility] provides Physical Therapy evaluation and treatment and many specialty services across the [place] system.  If requesting a specialty program, please choose from the list below.    If you have not heard from the scheduling office within 2 business days, please call [phone] for all locations, with the exception of [place], please call [phone].  Treatment: Evaluation & Treatment  Special Instructions/Modalities: none  Special Programs: None    Please be aware that coverage of these services is subject to the  "terms and limitations of your health insurance plan.  Call member services at your health plan with any benefit or coverage questions.      **Note to Provider:  If you are referring outside of [facility] for the therapy appointment, please list the name of the location in the \"special instructions\" above, print the referral and give to the patient to schedule the appointment.                  Further instructions from your care team       1. Please follow up with your cardiologist as soon as possible to discuss your stress test and possible angiogram.  Use your sublingual nitroglycerin with any chest pain and call 911    2. Please start taking 15mg of Imdur once daily. This medication is waiting for you at the [facility]. This is a medication that you should NOT take with your Viagra.    3. Please see Thien Davison within 7-10 days of discharge; we would like for to talk to him about your diabetes.  Your A1c while you were in the hospital was 7.2%.  This is an indciator that your Type II Diabetes has likely progressed to the point in which you will need to take diabetes medications.      Pending Results     Date and Time Order Name Status Description    1/24/2018 0848 EKG 12-lead, tracing only In process     1/23/2018 2323 EKG 12-LEAD, TRACING ONLY In process     1/23/2018 1236 EKG 12-lead, tracing only In process             Statement of Approval     Ordered          01/25/18 7953  I have reviewed and agree with all the recommendations and orders detailed in this document.  EFFECTIVE NOW     Approved and electronically signed by:  Sheyla Means PA-C             Admission Information     Date & Time Provider Department Dept. Phone    1/19/2018 Chuck Hernandez MD [facility] [phone]      Your Vitals Were     Blood Pressure Pulse Temperature Respirations Height Weight    147/79 (BP Location: Left arm) 79 97.9  F (36.6  C) (Oral) 16 1.778 m (5' 10\") 93.8 kg (206 lb 12.7 oz)    " "Pulse Oximetry BMI (Body Mass Index)                98% 29.67 kg/m2          [facility]"Mobile Location, IP" Information     [facility] lets you send messages to your doctor, view your test results, renew your prescriptions, schedule appointments and more. To sign up, go to [email or web address] . Click on \"Log in\" on the left side of the screen, which will take you to the Welcome page. Then click on \"Sign up Now\" on the right side of the page.     You will be asked to enter the access code listed below, as well as some personal information. Please follow the directions to create your username and password.     Your access code is: [email or web address]  Expires: 2018 11:50 AM     Your access code will  in 90 days. If you need help or a new code, please call your [facility] clinic or [phone].        Care EveryWhere ID     This is your Care EveryWhere ID. This could be used by other organizations to access your [facility] medical records  [id number]        Equal Access to Services     DAREK LINDSEY : Hadii christa mcfarlane Sojim, waaxda luqrahel, qaybta kaalmafaiza payne, rochelle rockwell . So [facility] [phone].    ATENCIÓN: Si habla español, tiene a orozco disposición servicios gratuitos de asistencia lingüística. Llame al [phone].    We comply with applicable federal civil rights laws and [place] laws. We do not discriminate on the basis of race, color, national origin, age, disability, sex, sexual orientation, or gender identity.               Review of your medicines      START taking        Dose / Directions    acetaminophen 325 MG tablet   Commonly known as:  TYLENOL   Used for:  S/P revision of total hip        Dose:  650 mg   Take 2 tablets (650 mg) by mouth every 4 hours as needed for other (surgical pain)   Quantity:  100 tablet   Refills:  0       HYDROmorphone 2 MG tablet   Commonly known as:  DILAUDID   Used for:  S/P revision of total hip        Dose:  2-4 mg   Take 1-2 tablets (2-4 mg) by mouth " every 4 hours as needed for pain   Quantity:  60 tablet   Refills:  0       isosorbide mononitrate 30 MG 24 hr tablet   Commonly known as:  IMDUR        Dose:  15 mg   Take 0.5 tablets (15 mg) by mouth daily   Quantity:  30 tablet   Refills:  0       senna-docusate 8.6-50 MG per tablet   Commonly known as:  SENOKOT-S;PERICOLACE   Used for:  S/P revision of total hip        Dose:  1 tablet   Take 1 tablet by mouth 2 times daily as needed for constipation   Quantity:  100 tablet   Refills:  0         CONTINUE these medicines which may have CHANGED, or have new prescriptions. If we are uncertain of the size of tablets/capsules you have at home, strength may be listed as something that might have changed.        Dose / Directions    aspirin  MG EC tablet   This may have changed:    - medication strength  - how much to take   Used for:  S/P revision of total hip        Dose:  325 mg   Take 1 tablet (325 mg) by mouth daily   Quantity:  42 tablet   Refills:  0       * order for DME   This may have changed:  Another medication with the same name was added. Make sure you understand how and when to take each.        At Bedtime CPAP   Refills:  0       * order for DME   This may have changed:  You were already taking a medication with the same name, and this prescription was added. Make sure you understand how and when to take each.   Used for:  S/P revision of total hip        Equipment being ordered: Walker Wheels () and Walker () Treatment Diagnosis: s/p R ROMAN   Quantity:  1 Units   Refills:  0       * Notice:  This list has 2 medication(s) that are the same as other medications prescribed for you. Read the directions carefully, and ask your doctor or other care provider to review them with you.      CONTINUE these medicines which have NOT CHANGED        Dose / Directions    losartan 50 MG tablet   Commonly known as:  COZAAR        Dose:  50 mg   Take 50 mg by mouth daily.   Refills:  0       meloxicam 15 MG  tablet   Commonly known as:  MOBIC        Dose:  15 mg   Take 15 mg by mouth daily   Refills:  0       metoprolol tartrate 100 MG tablet   Commonly known as:  LOPRESSOR        Dose:  100 mg   Take 1 tablet (100 mg) by mouth 2 times daily   Quantity:  60 tablet   Refills:  0       * PRESERVISION/LUTEIN PO        Dose:  1 capsule   Take 1 capsule by mouth 2 times daily   Refills:  0       * MULTIVITAL PO        Dose:  1 tablet   Take 1 tablet by mouth 2 times daily   Refills:  0       nitroGLYcerin 0.4 MG sublingual tablet   Commonly known as:  NITROSTAT        Dose:  0.4 mg   Place 1 tablet (0.4 mg) under the tongue every 5 minutes as needed for chest pain   Quantity:  25 tablet   Refills:  0       NORVASC 5 MG tablet   Generic drug:  amLODIPine        Dose:  5 mg   Take 5 mg by mouth daily.   Refills:  0       OMEGA 3 PO        Dose:  1 capsule   Take 1 capsule by mouth 2 times daily   Refills:  0       omeprazole 40 MG capsule   Commonly known as:  priLOSEC        Dose:  40 mg   Take 40 mg by mouth daily   Refills:  0       OVER-THE-COUNTER        Dose:  2 tablet   2 tablets daily Tebs for eyes.   Refills:  0       oxybutynin 5 MG 24 hr tablet   Commonly known as:  DITROPAN-XL        Dose:  10 mg   Take 10 mg by mouth daily   Refills:  0       VIAGRA 100 MG tablet   Generic drug:  sildenafil        Dose:  100 mg   Take 100 mg by mouth daily as needed   Refills:  2       VITAMIN D3 PO        Dose:  5000 Units   Take 5,000 Units by mouth every 3 days   Refills:  0       ZOCOR 20 MG tablet   Generic drug:  simvastatin        Dose:  20 mg   Take 20 mg by mouth At Bedtime.   Refills:  0       * Notice:  This list has 2 medication(s) that are the same as other medications prescribed for you. Read the directions carefully, and ask your doctor or other care provider to review them with you.         Where to get your medicines      These medications were sent to [facility], [place] - [address]   [address] [address]     Phone:  [phone]     acetaminophen 325 MG tablet    aspirin  MG EC tablet    isosorbide mononitrate 30 MG 24 hr tablet         Some of these will need a paper prescription and others can be bought over the counter. Ask your nurse if you have questions.     Bring a paper prescription for each of these medications     HYDROmorphone 2 MG tablet    order for DME       You don't need a prescription for these medications     senna-docusate 8.6-50 MG per tablet                Protect others around you: Learn how to safely use, store and throw away your medicines at www.disposemymeds.org.        Information about OPIOIDS     PRESCRIPTION OPIOIDS: WHAT YOU NEED TO KNOW    Prescription opioids can be used to help relieve moderate to severe pain and are often prescribed following a surgery or injury, or for certain health conditions. These medications can be an important part of treatment but also come with serious risks. It is important to work with your health care provider to make sure you are getting the safest, most effective care.    WHAT ARE THE RISKS AND SIDE EFFECTS OF OPIOID USE?  Prescription opioids carry serious risks of addiction and overdose, especially with prolonged use. An opioid overdose, often marked by slowed breathing can cause sudden death. The use of prescription opioids can have a number of side effects as well, even when taken as directed:      Tolerance - meaning you might need to take more of a medication for the same pain relief    Physical dependence - meaning you have symptoms of withdrawal when a medication is stopped    Increased sensitivity to pain    Constipation    Nausea, vomiting, and dry mouth    Sleepiness and dizziness    Confusion    Depression    Low levels of testosterone that can result in lower sex drive, energy, and strength    Itching and sweating    RISKS ARE GREATER WITH:    History of drug misuse, substance use disorder, or  overdose    Mental health conditions (such as depression or anxiety)    Sleep apnea    Older age (65 years or older)    Pregnancy    Avoid alcohol while taking prescription opioids.   Also, unless specifically advised by your health care provider, medications to avoid include:    Benzodiazepines (such as Xanax or Valium)    Muscle relaxants (such as Soma or Flexeril)    Hypnotics (such as Ambien or Lunesta)    Other prescription opioids    KNOW YOUR OPTIONS:  Talk to your health care provider about ways to manage your pain that do not involve prescription opioids. Some of these options may actually work better and have fewer risks and side effects:    Pain relievers such as acetaminophen, ibuprofen, and naproxen    Some medications that are also used for depression or seizures    Physical therapy and exercise    Cognitive behavioral therapy, a psychological, goal-directed approach, in which patients learn how to modify physical, behavioral, and emotional triggers of pain and stress    IF YOU ARE PRESCRIBED OPIOIDS FOR PAIN:    Never take opioids in greater amounts or more often than prescribed    Follow up with your primary health care provider and work together to create a plan on how to manage your pain.    Talk about ways to help manage your pain that do not involve prescription opioids    Talk about all concerns and side effects    Help prevent misuse and abuse    Never sell or share prescription opioids    Never use another person's prescription opioids    Store prescription opioids in a secure place and out of reach of others (this may include visitors, children, friends, and family)    Visit www.cdc.gov/drugoverdose to learn about risks of opioid abuse and overdose    If you believe you may be struggling with addiction, tell your health care provider and ask for guidance or call [facility]'s National Helpline at 3-215-106-HELP    LEARN MORE / www.cdc.gov/drugoverdose/prescribing/guideline.html    Safely dispose  of unused prescription opioids: Find your local drug take-back programs and more information about the importance of safe disposal at www.doseofreality.[place].gov             Medication List: This is a list of all your medications and when to take them. Check marks below indicate your daily home schedule. Keep this list as a reference.      Medications           Morning Afternoon Evening Bedtime As Needed    acetaminophen 325 MG tablet   Commonly known as:  TYLENOL   Take 2 tablets (650 mg) by mouth every 4 hours as needed for other (surgical pain)   Last time this was given:  650 mg on 1/25/2018  3:14 PM                                aspirin  MG EC tablet   Take 1 tablet (325 mg) by mouth daily                                HYDROmorphone 2 MG tablet   Commonly known as:  DILAUDID   Take 1-2 tablets (2-4 mg) by mouth every 4 hours as needed for pain   Last time this was given:  4 mg on 1/25/2018  3:13 PM                                isosorbide mononitrate 30 MG 24 hr tablet   Commonly known as:  IMDUR   Take 0.5 tablets (15 mg) by mouth daily                                losartan 50 MG tablet   Commonly known as:  COZAAR   Take 50 mg by mouth daily.   Last time this was given:  50 mg on 1/25/2018 11:19 AM                                meloxicam 15 MG tablet   Commonly known as:  MOBIC   Take 15 mg by mouth daily                                metoprolol tartrate 100 MG tablet   Commonly known as:  LOPRESSOR   Take 1 tablet (100 mg) by mouth 2 times daily   Last time this was given:  25 mg on 1/24/2018  5:15 PM                                * PRESERVISION/LUTEIN PO   Take 1 capsule by mouth 2 times daily                                * MULTIVITAL PO   Take 1 tablet by mouth 2 times daily                                nitroGLYcerin 0.4 MG sublingual tablet   Commonly known as:  NITROSTAT   Place 1 tablet (0.4 mg) under the tongue every 5 minutes as needed for chest pain   Last time this was given:  0.4 mg  on 1/24/2018 11:44 AM                                NORVASC 5 MG tablet   Take 5 mg by mouth daily.   Last time this was given:  5 mg on 1/25/2018 11:20 AM   Generic drug:  amLODIPine                                OMEGA 3 PO   Take 1 capsule by mouth 2 times daily                                omeprazole 40 MG capsule   Commonly known as:  priLOSEC   Take 40 mg by mouth daily   Last time this was given:  40 mg on 1/24/2018  9:27 PM                                * order for DME   At Bedtime CPAP                                * order for DME   Equipment being ordered: Walker Wheels () and Walker () Treatment Diagnosis: s/p R ROMAN                                OVER-THE-COUNTER   2 tablets daily Tebs for eyes.                                oxybutynin 5 MG 24 hr tablet   Commonly known as:  DITROPAN-XL   Take 10 mg by mouth daily   Last time this was given:  10 mg on 1/25/2018 11:19 AM                                senna-docusate 8.6-50 MG per tablet   Commonly known as:  SENOKOT-S;PERICOLACE   Take 1 tablet by mouth 2 times daily as needed for constipation   Last time this was given:  1 tablet on 1/25/2018 11:20 AM                                VIAGRA 100 MG tablet   Take 100 mg by mouth daily as needed   Generic drug:  sildenafil                                VITAMIN D3 PO   Take 5,000 Units by mouth every 3 days                                ZOCOR 20 MG tablet   Take 20 mg by mouth At Bedtime.   Last time this was given:  20 mg on 1/24/2018  9:28 PM   Generic drug:  simvastatin                                * Notice:  This list has 4 medication(s) that are the same as other medications prescribed for you. Read the directions carefully, and ask your doctor or other care provider to review them with you.

## 2018-01-19 NOTE — ANESTHESIA CARE TRANSFER NOTE
Patient: Bryson Mendez    Procedure(s):  Right Hip Acetabulum Revision - Wound Class: I-Clean    Diagnosis: failed total hip arthroplasty  Diagnosis Additional Information: No value filed.    Anesthesia Type:   Spinal     Note:  Airway :Face Mask  Patient transferred to:PACU  Handoff Report: Identifed the Patient, Identified the Reponsible Provider, Reviewed the pertinent medical history, Discussed the surgical course, Reviewed Intra-OP anesthesia mangement and issues during anesthesia, Set expectations for post-procedure period and Allowed opportunity for questions and acknowledgement of understanding      Vitals: (Last set prior to Anesthesia Care Transfer)    CRNA VITALS  1/19/2018 1600 - 1/19/2018 1630      1/19/2018             Resp Rate (observed): (!)  1                Electronically Signed By: ZOILA Saha CRNA  January 19, 2018  4:30 PM

## 2018-01-19 NOTE — ANESTHESIA PROCEDURE NOTES
Peripheral nerve/Neuraxial procedure note : intrathecal  Pre-Procedure  Performed by  MURRAY TRUJILLO   Location: OR    Procedure Times:1/19/2018 2:52 PM and 1/19/2018 3:01 PM  Pre-Anesthestic Checklist: patient identified, IV checked, site marked, risks and benefits discussed, informed consent, monitors and equipment checked, pre-op evaluation and at physician/surgeon's request    Timeout  Correct Patient: Yes   Correct Procedure: Yes   Correct Site: Yes   Correct Laterality: N/A   Correct Position: Yes   Site Marked: N/A   .   Procedure Documentation  ASA 3  .    Procedure:    Intrathecal.  Insertion Site:L3-4  (midline approach)      Patient Prep;mask, sterile gloves, povidone-iodine 7.5% surgical scrub, patient draped.  .  Needle: Eder tip Spinal Needle (gauge): 22  Spinal/LP Needle Length (inches): 3.5 # of attempts: 1 and  # of redirects:  1 Introducer used Introducer: 20 G .       Assessment/Narrative  Paresthesias: No.  .  .  clear CSF fluid removed while sitting   . Time Injected: 15:00

## 2018-01-20 ENCOUNTER — APPOINTMENT (OUTPATIENT)
Dept: PHYSICAL THERAPY | Facility: CLINIC | Age: 78
DRG: 467 | End: 2018-01-20
Attending: ORTHOPAEDIC SURGERY
Payer: MEDICARE

## 2018-01-20 ENCOUNTER — APPOINTMENT (OUTPATIENT)
Dept: OCCUPATIONAL THERAPY | Facility: CLINIC | Age: 78
DRG: 467 | End: 2018-01-20
Attending: ORTHOPAEDIC SURGERY
Payer: MEDICARE

## 2018-01-20 LAB
GLUCOSE BLDC GLUCOMTR-MCNC: 258 MG/DL (ref 70–99)
HGB BLD-MCNC: 12.6 G/DL (ref 13.3–17.7)

## 2018-01-20 PROCEDURE — 36415 COLL VENOUS BLD VENIPUNCTURE: CPT | Performed by: ORTHOPAEDIC SURGERY

## 2018-01-20 PROCEDURE — 40000133 ZZH STATISTIC OT WARD VISIT

## 2018-01-20 PROCEDURE — 97162 PT EVAL MOD COMPLEX 30 MIN: CPT | Mod: GP | Performed by: PHYSICAL THERAPIST

## 2018-01-20 PROCEDURE — 97116 GAIT TRAINING THERAPY: CPT | Mod: GP | Performed by: PHYSICAL THERAPIST

## 2018-01-20 PROCEDURE — 97165 OT EVAL LOW COMPLEX 30 MIN: CPT | Mod: GO

## 2018-01-20 PROCEDURE — 25000132 ZZH RX MED GY IP 250 OP 250 PS 637: Mod: GY | Performed by: PHYSICIAN ASSISTANT

## 2018-01-20 PROCEDURE — A9270 NON-COVERED ITEM OR SERVICE: HCPCS | Mod: GY | Performed by: ORTHOPAEDIC SURGERY

## 2018-01-20 PROCEDURE — A9270 NON-COVERED ITEM OR SERVICE: HCPCS | Mod: GY | Performed by: PHYSICIAN ASSISTANT

## 2018-01-20 PROCEDURE — 40000193 ZZH STATISTIC PT WARD VISIT: Performed by: PHYSICAL THERAPIST

## 2018-01-20 PROCEDURE — 97535 SELF CARE MNGMENT TRAINING: CPT | Mod: GO

## 2018-01-20 PROCEDURE — 25000132 ZZH RX MED GY IP 250 OP 250 PS 637: Mod: GY | Performed by: ORTHOPAEDIC SURGERY

## 2018-01-20 PROCEDURE — 25000128 H RX IP 250 OP 636: Performed by: ORTHOPAEDIC SURGERY

## 2018-01-20 PROCEDURE — 97110 THERAPEUTIC EXERCISES: CPT | Mod: GP | Performed by: PHYSICAL THERAPIST

## 2018-01-20 PROCEDURE — 12000000 ZZH R&B MED SURG/OB

## 2018-01-20 PROCEDURE — 25800025 ZZH RX 258: Performed by: ORTHOPAEDIC SURGERY

## 2018-01-20 PROCEDURE — 00000146 ZZHCL STATISTIC GLUCOSE BY METER IP

## 2018-01-20 PROCEDURE — 85018 HEMOGLOBIN: CPT | Performed by: ORTHOPAEDIC SURGERY

## 2018-01-20 RX ORDER — METOPROLOL TARTRATE 25 MG/1
25 TABLET, FILM COATED ORAL 2 TIMES DAILY
Status: DISCONTINUED | OUTPATIENT
Start: 2018-01-20 | End: 2018-01-24

## 2018-01-20 RX ORDER — AMLODIPINE BESYLATE 5 MG/1
5 TABLET ORAL DAILY
Status: DISCONTINUED | OUTPATIENT
Start: 2018-01-21 | End: 2018-01-20

## 2018-01-20 RX ORDER — AMLODIPINE BESYLATE 5 MG/1
5 TABLET ORAL DAILY
Status: DISCONTINUED | OUTPATIENT
Start: 2018-01-20 | End: 2018-01-25 | Stop reason: HOSPADM

## 2018-01-20 RX ADMIN — SIMVASTATIN 20 MG: 20 TABLET, FILM COATED ORAL at 22:49

## 2018-01-20 RX ADMIN — ACETAMINOPHEN 975 MG: 325 TABLET, FILM COATED ORAL at 18:20

## 2018-01-20 RX ADMIN — ENOXAPARIN SODIUM 40 MG: 40 INJECTION SUBCUTANEOUS at 14:22

## 2018-01-20 RX ADMIN — HYDROMORPHONE HYDROCHLORIDE 4 MG: 2 TABLET ORAL at 22:49

## 2018-01-20 RX ADMIN — POTASSIUM CHLORIDE, DEXTROSE MONOHYDRATE AND SODIUM CHLORIDE: 150; 5; 450 INJECTION, SOLUTION INTRAVENOUS at 04:05

## 2018-01-20 RX ADMIN — OMEPRAZOLE 40 MG: 20 CAPSULE, DELAYED RELEASE ORAL at 20:46

## 2018-01-20 RX ADMIN — OXYBUTYNIN CHLORIDE 10 MG: 5 TABLET, EXTENDED RELEASE ORAL at 08:06

## 2018-01-20 RX ADMIN — SENNOSIDES AND DOCUSATE SODIUM 2 TABLET: 8.6; 5 TABLET ORAL at 08:06

## 2018-01-20 RX ADMIN — HYDROMORPHONE HYDROCHLORIDE 4 MG: 2 TABLET ORAL at 08:07

## 2018-01-20 RX ADMIN — AMLODIPINE BESYLATE 5 MG: 5 TABLET ORAL at 10:33

## 2018-01-20 RX ADMIN — CEFAZOLIN SODIUM 2 G: 2 INJECTION, SOLUTION INTRAVENOUS at 08:03

## 2018-01-20 RX ADMIN — ACETAMINOPHEN 975 MG: 325 TABLET, FILM COATED ORAL at 01:10

## 2018-01-20 RX ADMIN — ACETAMINOPHEN 975 MG: 325 TABLET, FILM COATED ORAL at 10:33

## 2018-01-20 RX ADMIN — HYDROMORPHONE HYDROCHLORIDE 4 MG: 2 TABLET ORAL at 18:20

## 2018-01-20 RX ADMIN — HYDROMORPHONE HYDROCHLORIDE 4 MG: 2 TABLET ORAL at 12:33

## 2018-01-20 RX ADMIN — HYDROMORPHONE HYDROCHLORIDE 4 MG: 2 TABLET ORAL at 03:38

## 2018-01-20 ASSESSMENT — PAIN DESCRIPTION - DESCRIPTORS: DESCRIPTORS: ACHING

## 2018-01-20 NOTE — PROGRESS NOTES
"   01/20/18 1100   Signing Clinician's Name / Credentials   Signing clinician's name / credentials ANASTASIIA Samaniego   Quick Adds   Rehab Discipline OT   ADL Training   Minutes of Treatment 22   Symptoms Noted During/After Treatment none   Treatment ADL training within precautions   Treatment Detail VCs/S sit to stand and chair, toilet transfers using RW. Min A to don pull up over feet, SBA at RW to pull up. SBA all aspects toileting    Patient Response Able to follow techniques as instructed   Additional Documentation   OT Plan See POC   [facility] AM-PAC  \"6 Clicks\" Daily Activity Inpatient Short Form   1. Putting on and taking off regular lower body clothing? 3 - A Little   2. Bathing (including washing, rinsing, drying)? 3 - A Little   3. Toileting, which includes using toilet, bedpan or urinal? 3 - A Little   4. Putting on and taking off regular upper body clothing? 4 - None   5. Taking care of personal grooming such as brushing teeth? 4 - None   6. Eating meals? 4 - None   Daily Activity Raw Score (Score out of 24.Lower scores equate to lower levels of function) 21   Total Session Time   Total Session Time (minutes) 37 minutes     ANASTASIIA Samaniego  "

## 2018-01-20 NOTE — PLAN OF CARE
Problem: Patient Care Overview  Goal: Plan of Care/Patient Progress Review  OT Discharge Plan  Patient plan for D/C:  Home with A PRN    Current Status:  S/CGA self cares in room using RW. Min A LB dressing, needs equipment    Barriers to return to prior living situation:   none    Recommendation for D/C:  home    Rationale for D/C recommendations:  At this point pt demonstrates sufficient skills to care for self at home    Rona Marte, OTR

## 2018-01-20 NOTE — PROGRESS NOTES
[facility]    Assessment and Plan:    Principal Problem:    revision total hip arthroplasty (H) POD #1  Doing well per ortho.    Active Problems:    CAD (coronary artery disease)   a bit bradycardic  w will hold betablocker try to restart cyndi.  Pt melva his primary doc was thinking of decreasing metoprolol but was waiting until after surgey.  Will decreasf from 100mg twice daily to 25 mg twice daily.    MAIDA (obstructive sleep apnea)  Good control.  Continue currant medications, continue to monito     A-fib (H)   rate controlled     Walls's esophagus without dysplasia   no problems swallowing    Benign essential hypertension  Will hold bp meds if pressure is low      Disposition: Tentative plan is to discharge patient Home in 1-2 days    Subjective: pain well controlled no dizzyness no chest pain or sob. Review of Systems:  C: NEGATIVE for fever, chills, change in weight  E/M: NEGATIVE for ear, mouth and throat problems  R: NEGATIVE for significant cough or SOB  CV: NEGATIVE for chest pain, palpitations or peripheral edema    Physical Exam:  Vitals Were Reviewed    Patient Vitals for the past 16 hrs:   BP Temp Temp src Pulse Heart Rate Resp SpO2   01/20/18 1032 132/65 - - 54 - - 97 %   01/20/18 0721 118/62 97.7  F (36.5  C) Oral 50 - 18 96 %   01/20/18 0400 - - - - - 16 -   01/20/18 0329 123/69 97  F (36.1  C) Oral - 68 16 97 %   01/20/18 0015 - - - - - 16 -   01/20/18 0000 - - - - - - 95 %    Patient Vitals for the past 16 hrs:   SpO2 O2 Device   01/20/18 1032 97 % None (Room air)   01/20/18 0721 96 % None (Room air)   01/20/18 0329 97 % BiPAP/CPAP   01/20/18 0000 95 % BiPAP/CPAP         Intake/Output Summary (Last 24 hours) at 01/20/18 1459  Last data filed at 01/20/18 1239   Gross per 24 hour   Intake           6020.7 ml   Output             1575 ml   Net           4445.7 ml       Patient Vitals for the past 24 hrs:   Urine Occurrence   01/19/18 2230 1   01/19/18 2300 1   01/20/18 0000  1   01/20/18 0100 1   01/20/18 0318 1   01/20/18 0900 1   01/20/18 1233 1   01/20/18 1428 1       GENERAL APPEARANCE: healthy, alert and no distress  EYES: conjunctiva clear, eyes grossly normal  RESP: lungs clear to auscultation - no rales, rhonchi or wheezes  CV: regular rate and rhythm, normal S1 S2, no S3 or S4 and no murmur, click or rub   ABDOMEN: soft, nontender, no HSM or masses and bowel sounds normal  MS: no clubbing, cyanosis; no edema  SKIN: clear without significant rashes or lesions    Lab:  CMP  Recent Labs  Lab 01/19/18  1819   CR 0.68   GFRESTIMATED >90   GFRESTBLACK >90     CBC  Recent Labs  Lab 01/20/18  0640 01/19/18  1819   HGB 12.6*  --    PLT  --  118*     INRNo lab results found in last 7 days.  Arterial Blood GasNo lab results found in last 7 days.     Intake/Output Summary (Last 24 hours) at 01/20/18 1459  Last data filed at 01/20/18 1239   Gross per 24 hour   Intake           6020.7 ml   Output             1575 ml   Net           4445.7 ml

## 2018-01-20 NOTE — PLAN OF CARE
Problem: Patient Care Overview  Goal: Plan of Care/Patient Progress Review  Discharge Planner PT   Patient plan for discharge: Currently does not plan on home care, or out pt services   Current status: Spouse and mother inlaw  Barriers to return to prior living situation: None identified  Recommendations for discharge: Home as planned unless status changes  Rationale for recommendations: No current concerns, safety concerns, or impulsive concerns       Entered by: Cinthia Brunner 01/20/2018 10:49 AM

## 2018-01-20 NOTE — PROGRESS NOTES
01/20/18 1100   Quick Adds   Type of Visit Initial Occupational Therapy Evaluation   Living Environment   Lives With parent(s);spouse   Living Arrangements house   Home Accessibility no concerns   General Information   Onset of Illness/Injury or Date of Surgery - Date 01/19/18   Referring Physician Damir   Patient/Family Goals Statement Return home   Additional Occupational Profile Info/Pertinent History of Current Problem 78 yo M with prior knee and hip replacements. D/t a failed ROMAN from 2013 pt is now a R hip acetabulum revision.   Precautions/Limitations right hip precautions   Weight-Bearing Status - RLE weight-bearing as tolerated   General Info Comments Pt lives I'ly in a 1 level home with wife and mother in law who wife cares for. Pt uses a walk-in shower with no seat. Has access to tub/shower if needed. 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 Drives. Has a reacher and SA from previous surgeries   Cognitive Status Examination   Orientation orientation to person, place and time   Level of Consciousness alert   Cognitive Comment No concern   Pain Assessment   Patient Currently in Pain (Surgical site discomfort controlled by meds)   Range of Motion (ROM)   ROM Comment Harsh Lozano   Strength   Strength Comments Harsh Lozano   Transfer Skill: Bed to Chair/Chair to Bed   Level of Wallace: Bed to Chair moderate assist (50% patients effort)   Physical Assist/Nonphysical Assist: Bed to Chair supervision;verbal cues   Weight-Bearing Restrictions weight-bearing as tolerated   Assistive Device - Transfer Skill Bed to Chair Chair to Bed Rehab Eval rolling walker   Transfer Skill: Sit to Stand   Level of Wallace: Sit/Stand contact guard   Physical Assist/Nonphysical Assist: Sit/Stand 1 person assist   Transfer Skill: Sit to Stand weight-bearing as tolerated   Assistive Device for Transfer: Sit/Stand rolling walker   Transfer Skill: Toilet Transfer   Level of Wallace: Toilet stand-by assist  "  Physical Assist/Nonphysical Assist: Toilet supervision   Weight-Bearing Restrictions: Toilet weight-bearing as tolerated   Assistive Device rolling walker;grab bars   Balance   Balance Comments No concerns   Toileting   Level of Hodges: Toilet stand-by assist   Activities of Daily Living Analysis   Impairments Contributing to Impaired Activities of Daily Living flexibility decreased;pain;post surgical precautions   General Therapy Interventions   Planned Therapy Interventions ADL retraining;transfer training   Clinical Impression   Criteria for Skilled Therapeutic Interventions Met yes, treatment indicated   OT Diagnosis weakness   Influenced by the following impairments s/p surgery for R ROMAN revision   Assessment of Occupational Performance 1-3 Performance Deficits   Identified Performance Deficits Bathing, dressing, toileting   Clinical Decision Making (Complexity) Low complexity   Therapy Frequency daily   Predicted Duration of Therapy Intervention (days/wks) 3 days   Anticipated Discharge Disposition Home with Assist   Risks and Benefits of Treatment have been explained. Yes   Patient, Family & other staff in agreement with plan of care Yes   [facility] AM-PAC  \"6 Clicks\" Daily Activity Inpatient Short Form   1. Putting on and taking off regular lower body clothing? 3 - A Little   2. Bathing (including washing, rinsing, drying)? 3 - A Little   3. Toileting, which includes using toilet, bedpan or urinal? 3 - A Little   4. Putting on and taking off regular upper body clothing? 4 - None   5. Taking care of personal grooming such as brushing teeth? 4 - None   6. Eating meals? 4 - None   Daily Activity Raw Score (Score out of 24.Lower scores equate to lower levels of function) 21   Total Evaluation Time   Total Evaluation Time (Minutes) 15     ANASTASIIA Samaniego  "

## 2018-01-20 NOTE — PLAN OF CARE
Problem: Patient Care Overview  Goal: Plan of Care/Patient Progress Review  Outcome: Improving  A&Ox4, VSS on RA, uses home cpap at night. Right hip dressing CDI, CMS intact. Moving very well, has stood at bedside multiple times for voiding & tolerating well w/A1 & walker. Voiding good amounts w/some dribbling.  Pain well controlled on PO dilaudid, tolerating diet.

## 2018-01-20 NOTE — PLAN OF CARE
"Problem: Hip Arthroplasty (Total, Partial) (Adult)  Goal: Signs and Symptoms of Listed Potential Problems Will be Absent, Minimized or Managed (Hip Arthroplasty)  Signs and symptoms of listed potential problems will be absent, minimized or managed by discharge/transition of care (reference Hip Arthroplasty (Total, Partial) (Adult) CPG).   Outcome: No Change  Patients BLE's are still numb.  Abductor wedge in place.  Has not yet voided.  Tolerating jello/juice/water.  No nausea.    Problem: Patient Care Overview  Goal: Plan of Care/Patient Progress Review  Outcome: No Change  [place] [facility] ADMISSION NOTE    Patient admitted to room 2307 at approximately 1720 via cart from surgery. Patient was accompanied by transport tech.     Verbal SBAR report received from Angela THOMAS prior to patient arrival.     Patient trasferred to bed via Yellow Slip sheet. Patient alert and oriented X 3. The patient is not having any pain.  . Admission vital signs: Blood pressure 136/66, temperature 97.4  F (36.3  C), temperature source Oral, resp. rate 11, height 1.778 m (5' 10\"), weight 88.9 kg (196 lb), SpO2 98 %. Patient and spouse were oriented to plan of care, call light, bed controls, tv, telephone, bathroom and visiting hours.     The following safety risks were identified during admission: fall. Yellow risk band applied: YES.     Shannan Hunt        "

## 2018-01-20 NOTE — PROGRESS NOTES
"[facility] Progress Note      Post-operative Day: 1 Day Post-Op    Procedure(s):  Right Hip Acetabulum Revision - Wound Class: I-Clean      Subjective:    Pain: minimal  Chest pain, SOB:  No      Objective:  Blood pressure 118/62, pulse 50, temperature 97.7  F (36.5  C), temperature source Oral, resp. rate 18, height 1.778 m (5' 10\"), weight 88.9 kg (196 lb), SpO2 96 %.    Patient Vitals for the past 24 hrs:   BP Temp Temp src Pulse Heart Rate Resp SpO2 Height Weight   01/20/18 0721 118/62 97.7  F (36.5  C) Oral 50 - 18 96 % - -   01/20/18 0400 - - - - - 16 - - -   01/20/18 0329 123/69 97  F (36.1  C) Oral - 68 16 97 % - -   01/20/18 0015 - - - - - 16 - - -   01/20/18 0000 - - - - - - 95 % - -   01/19/18 2245 - - - - - 16 - - -   01/19/18 2236 - - - - 77 - 96 % - -   01/19/18 2100 124/71 97.6  F (36.4  C) Oral - 68 14 97 % - -   01/19/18 1907 140/77 - - - 59 11 98 % - -   01/19/18 1833 136/66 - - - 51 - 97 % - -   01/19/18 1815 138/65 97.4  F (36.3  C) Oral - 55 10 98 % - -   01/19/18 1800 138/67 - - - 52 - - - -   01/19/18 1745 133/70 - - - 39 - - - -   01/19/18 1730 138/59 - - - 44 - - - -   01/19/18 1659 147/75 - - - 54 16 96 % - -   01/19/18 1645 147/70 - - - 54 16 94 % - -   01/19/18 1633 136/90 98  F (36.7  C) Oral - 62 16 97 % - -   01/19/18 1235 125/70 97.5  F (36.4  C) Oral - 47 16 97 % 1.778 m (5' 10\") 88.9 kg (196 lb)       Wt Readings from Last 4 Encounters:   01/19/18 88.9 kg (196 lb)   07/07/15 93 kg (205 lb)   06/25/15 93 kg (205 lb)   05/17/15 90.7 kg (200 lb)         Motor function, sensation, and circulation intact   Yes  Wound status: incisions are clean dry and intact. Yes  Calf tenderness: Bilateral  No    Pertinent Labs   Lab Results: personally reviewed.     Recent Labs   Lab Test  01/20/18   0640  01/19/18   1819  07/16/14   0520  07/15/14   1005  07/15/14   0525  07/14/14   0605  07/13/14   1940  06/20/13   0630   INR   --    --    --   1.14   --    --   1.16*  1.41*   HGB  " 12.6*   --   13.3   --   14.1  13.8  15.3   --    HCT   --    --    --    --   40.6  40.5  44.1   --    MCV   --    --    --    --   92  94  93   --    PLT   --   118*   --    --   201  204  213   --    NA   --    --   134   --   134  136  137   --        Plan: Anticoagulation protocol: Lovenox inpatient and then  mg daily at discharge  x 42  days            Pain medications:  oxycodone            Weight bearing status:  WBAT            Disposition:  table             Continue cares and rehabilitation     Report completed by:  David Reich MD  Date: 1/20/2018  Time: 8:55 AM

## 2018-01-20 NOTE — PROGRESS NOTES
01/20/18 1000   Quick Adds   Type of Visit Initial PT Evaluation   Living Environment   Lives With parent(s);spouse   Living Arrangements house   Home Accessibility bed and bath on same level;grab bars present (bathtub);grab bars present (toilet);stairs (2 railings present);stairs to enter home;stairs within home   Number of Stairs to Enter Home 5   Stair Railings at Home outside, present at both sides   Transportation Available car;family or friend will provide   Living Environment Comment Spouse recovering from CA rehabilitation   Self-Care   Dominant Hand right   Usual Activity Tolerance good   Current Activity Tolerance fair   Activity/Exercise/Self-Care Comment Heart attack and 5 stints, right ROMAN, Left and right knee TKA    Functional Level Prior   Ambulation 1-->assistive equipment   Transferring 1-->assistive equipment   Toileting 1-->assistive equipment   Bathing 1-->assistive equipment   Dressing 0-->independent   Eating 0-->independent   Communication 0-->understands/communicates without difficulty   Swallowing 0-->swallows foods/liquids without difficulty   Cognition 0 - no cognition issues reported   Fall history within last six months no   General Information   Onset of Illness/Injury or Date of Surgery - Date 01/19/18   Referring Physician Dr Reich   Patient/Family Goals Statement Home with spouse and does not feel her needs home care or out pt services   Pertinent History of Current Problem (include personal factors and/or comorbidities that impact the POC) Cardiac conditions, replacement   Precautions/Limitations right hip precautions   Weight-Bearing Status - LUE full weight-bearing   Weight-Bearing Status - RUE full weight-bearing   Weight-Bearing Status - LLE full weight-bearing   Weight-Bearing Status - RLE weight-bearing as tolerated   Heart Disease Risk Factors Medical history   Cognitive Status Examination   Orientation orientation to person, place and time   Level of Consciousness alert  "  Follows Commands and Answers Questions 100% of the time   Personal Safety and Judgment intact   Memory intact   Cognitive Comment No concerns   Pain Assessment   Patient Currently in Pain (6/10  Nauseated  Returned later to deangelo dangelo)   Range of Motion (ROM)   ROM Comment SLR FF 15 lag, QS good, Aliya FF 80, knee 20-90   Strength   Strength Comments SLR FF 15 lag, QS good, Aliya FF 80, knee 20-90   Bed Mobility   Bed Mobility Comments Modififed independent Precuations    Transfer Skills   Transfer Comments RW with precautions set up   Gait   Gait Comments RW (+) trendelenberg    General Therapy Interventions   Planned Therapy Interventions ROM;strengthening;stretching;transfer training;risk factor education;home program guidelines;progressive activity/exercise   Clinical Impression   Criteria for Skilled Therapeutic Intervention yes, treatment indicated   PT Diagnosis ROMAN revision   Functional limitations due to impairments Recnt and acture due to surgery   Clinical Presentation Evolving/Changing   Clinical Decision Making (Complexity) Moderate complexity   Therapy Frequency` daily   Predicted Duration of Therapy Intervention (days/wks) 2 days   Anticipated Discharge Disposition Home with Assist   Risk & Benefits of therapy have been explained Yes   Patient, Family & other staff in agreement with plan of care Yes   [facility] AM-PAC  \"6 Clicks\" V.2 Basic Mobility Inpatient Short Form   1. Turning from your back to your side while in a flat bed without using bedrails? 4 - None   2. Moving from lying on your back to sitting on the side of a flat bed without using bedrails? 3 - A Little   3. Moving to and from a bed to a chair (including a wheelchair)? 3 - A Little   4. Standing up from a chair using your arms (e.g., wheelchair, or bedside chair)? 3 - A Little   5. To walk in hospital room? 3 - A Little   6. Climbing 3-5 steps with a railing? 3 - A Little   Basic Mobility Raw Score (Score out of 24.Lower " scores equate to lower levels of function) 19   Total Evaluation Time   Total Evaluation Time (Minutes) 30

## 2018-01-21 LAB
GLUCOSE BLDC GLUCOMTR-MCNC: 158 MG/DL (ref 70–99)
HGB BLD-MCNC: 11.8 G/DL (ref 13.3–17.7)

## 2018-01-21 PROCEDURE — A9270 NON-COVERED ITEM OR SERVICE: HCPCS | Mod: GY | Performed by: PHYSICIAN ASSISTANT

## 2018-01-21 PROCEDURE — 85018 HEMOGLOBIN: CPT | Performed by: ORTHOPAEDIC SURGERY

## 2018-01-21 PROCEDURE — 25000132 ZZH RX MED GY IP 250 OP 250 PS 637: Mod: GY | Performed by: ORTHOPAEDIC SURGERY

## 2018-01-21 PROCEDURE — A9270 NON-COVERED ITEM OR SERVICE: HCPCS | Mod: GY | Performed by: ORTHOPAEDIC SURGERY

## 2018-01-21 PROCEDURE — A9270 NON-COVERED ITEM OR SERVICE: HCPCS | Mod: GY | Performed by: FAMILY MEDICINE

## 2018-01-21 PROCEDURE — 25000132 ZZH RX MED GY IP 250 OP 250 PS 637: Mod: GY | Performed by: PHYSICIAN ASSISTANT

## 2018-01-21 PROCEDURE — 25000125 ZZHC RX 250: Performed by: ORTHOPAEDIC SURGERY

## 2018-01-21 PROCEDURE — 40000133 ZZH STATISTIC OT WARD VISIT

## 2018-01-21 PROCEDURE — 25000128 H RX IP 250 OP 636: Performed by: ORTHOPAEDIC SURGERY

## 2018-01-21 PROCEDURE — 25000132 ZZH RX MED GY IP 250 OP 250 PS 637: Mod: GY | Performed by: FAMILY MEDICINE

## 2018-01-21 PROCEDURE — 00000146 ZZHCL STATISTIC GLUCOSE BY METER IP

## 2018-01-21 PROCEDURE — 99207 ZZC CDG-MDM COMPONENT: MEETS LOW - DOWN CODED: CPT | Performed by: FAMILY MEDICINE

## 2018-01-21 PROCEDURE — 36415 COLL VENOUS BLD VENIPUNCTURE: CPT | Performed by: ORTHOPAEDIC SURGERY

## 2018-01-21 PROCEDURE — 99232 SBSQ HOSP IP/OBS MODERATE 35: CPT | Performed by: FAMILY MEDICINE

## 2018-01-21 PROCEDURE — 12000000 ZZH R&B MED SURG/OB

## 2018-01-21 RX ORDER — AMOXICILLIN 250 MG
2 CAPSULE ORAL 2 TIMES DAILY PRN
Status: DISCONTINUED | OUTPATIENT
Start: 2018-01-21 | End: 2018-01-25 | Stop reason: HOSPADM

## 2018-01-21 RX ORDER — POLYETHYLENE GLYCOL 3350 17 G/17G
17 POWDER, FOR SOLUTION ORAL DAILY PRN
Status: DISCONTINUED | OUTPATIENT
Start: 2018-01-21 | End: 2018-01-25 | Stop reason: HOSPADM

## 2018-01-21 RX ORDER — AMOXICILLIN 250 MG
1 CAPSULE ORAL 2 TIMES DAILY PRN
Status: DISCONTINUED | OUTPATIENT
Start: 2018-01-21 | End: 2018-01-25 | Stop reason: HOSPADM

## 2018-01-21 RX ORDER — BISACODYL 10 MG
10 SUPPOSITORY, RECTAL RECTAL DAILY PRN
Status: DISCONTINUED | OUTPATIENT
Start: 2018-01-21 | End: 2018-01-25 | Stop reason: HOSPADM

## 2018-01-21 RX ORDER — DOCUSATE SODIUM 100 MG/1
100 CAPSULE, LIQUID FILLED ORAL 2 TIMES DAILY
Status: DISCONTINUED | OUTPATIENT
Start: 2018-01-21 | End: 2018-01-22

## 2018-01-21 RX ADMIN — SENNOSIDES AND DOCUSATE SODIUM 1 TABLET: 8.6; 5 TABLET ORAL at 20:10

## 2018-01-21 RX ADMIN — OXYBUTYNIN CHLORIDE 10 MG: 5 TABLET, EXTENDED RELEASE ORAL at 08:47

## 2018-01-21 RX ADMIN — METOPROLOL TARTRATE 25 MG: 25 TABLET ORAL at 08:37

## 2018-01-21 RX ADMIN — ENOXAPARIN SODIUM 40 MG: 40 INJECTION SUBCUTANEOUS at 15:16

## 2018-01-21 RX ADMIN — DOCUSATE SODIUM 100 MG: 100 CAPSULE, LIQUID FILLED ORAL at 20:10

## 2018-01-21 RX ADMIN — ONDANSETRON 4 MG: 2 INJECTION INTRAMUSCULAR; INTRAVENOUS at 10:20

## 2018-01-21 RX ADMIN — PROCHLORPERAZINE EDISYLATE 5 MG: 5 INJECTION INTRAMUSCULAR; INTRAVENOUS at 10:57

## 2018-01-21 RX ADMIN — BISACODYL 10 MG: 10 SUPPOSITORY RECTAL at 11:01

## 2018-01-21 RX ADMIN — HYDROMORPHONE HYDROCHLORIDE 4 MG: 2 TABLET ORAL at 02:43

## 2018-01-21 RX ADMIN — SIMVASTATIN 20 MG: 20 TABLET, FILM COATED ORAL at 22:14

## 2018-01-21 RX ADMIN — ONDANSETRON 4 MG: 4 TABLET, ORALLY DISINTEGRATING ORAL at 22:14

## 2018-01-21 RX ADMIN — AMLODIPINE BESYLATE 5 MG: 5 TABLET ORAL at 08:37

## 2018-01-21 RX ADMIN — HYDROMORPHONE HYDROCHLORIDE 4 MG: 2 TABLET ORAL at 08:37

## 2018-01-21 RX ADMIN — SENNOSIDES AND DOCUSATE SODIUM 2 TABLET: 8.6; 5 TABLET ORAL at 08:43

## 2018-01-21 RX ADMIN — LOSARTAN POTASSIUM 50 MG: 50 TABLET, FILM COATED ORAL at 08:37

## 2018-01-21 RX ADMIN — ACETAMINOPHEN 975 MG: 325 TABLET, FILM COATED ORAL at 02:43

## 2018-01-21 RX ADMIN — OMEPRAZOLE 40 MG: 20 CAPSULE, DELAYED RELEASE ORAL at 20:09

## 2018-01-21 NOTE — PLAN OF CARE
Problem: Patient Care Overview  Goal: Plan of Care/Patient Progress Review  Outcome: Improving  Pt CMS is intact. Dressing CDI. Pain controlled with scheduled Tylenol and 4 mg PO dilaudid every 4 hours. HR in the mid to low 50s evening dose of metoprolol held per parameters. Passing flatus. Pt declined evening senna. Up with SBA with a walker. VSS.

## 2018-01-21 NOTE — PROGRESS NOTES
[facility]    Assessment and Plan:    Principal Problem:    revision total hip arthroplasty (H) POD #2  Doing well per ortho.    Nausea and constipation.  Pt has not had a BM since before surgery. Feels belchy. Had emesis.   Will give glycerine supp, constipation protocol  Back diet down to clears.   Active Problems:    CAD (coronary artery disease)   a bit bradycardic  w will hold betablocker try to restart cyndi.  Pt bentleys his primary doc was thinking of decreasing metoprolol but was waiting until after surgey.  Will decreasf from 100mg twice daily to 25 mg twice daily.    MAIDA (obstructive sleep apnea)  Good control.  Continue currant medications, continue to monito     A-fib (H)   rate controlled     Walls's esophagus without dysplasia   no problems swallowing    Benign essential hypertension    Disposition: Tentative plan is to discharge patient Home in 1-2 days    Subjective: pain well controlled no dizzyness no chest pain or sob. Review of Systems:  C: NEGATIVE for fever, chills, change in weight  E/M: NEGATIVE for ear, mouth and throat problems  R: NEGATIVE for significant cough or SOB  CV: NEGATIVE for chest pain, palpitations or peripheral edema    Physical Exam:  Vitals Were Reviewed    Patient Vitals for the past 16 hrs:   BP Temp Temp src Pulse Heart Rate Resp SpO2   01/21/18 1533 124/67 97.5  F (36.4  C) Oral - 55 18 94 %   01/21/18 1116 155/81 97.4  F (36.3  C) Oral 68 - 18 94 %   01/21/18 0716 143/79 97.7  F (36.5  C) Oral - 69 18 93 %   01/21/18 0436 127/59 97.7  F (36.5  C) Oral - 53 18 93 %      Patient Vitals for the past 16 hrs:   SpO2 O2 Device   01/21/18 1533 94 % None (Room air)   01/21/18 1116 94 % None (Room air)   01/21/18 0716 93 % None (Room air)   01/21/18 0436 93 % None (Room air)         Intake/Output Summary (Last 24 hours) at 01/20/18 1459  Last data filed at 01/20/18 1239   Gross per 24 hour   Intake           6020.7 ml   Output             1575 ml   Net            4445.7 ml       Patient Vitals for the past 24 hrs:   Urine Occurrence Stool Color   01/20/18 2132 1 -   01/21/18 0243 1 -   01/21/18 0734 1 -   01/21/18 1350 - Brown       GENERAL APPEARANCE: healthy, alert and no distress  EYES: conjunctiva clear, eyes grossly normal  RESP: lungs clear to auscultation - no rales, rhonchi or wheezes  CV: regular rate and rhythm, normal S1 S2, no S3 or S4 and no murmur, click or rub   ABDOMEN: soft, nontender, no HSM or masses and bowel sounds normal  MS: no clubbing, cyanosis; no edema  SKIN: clear without significant rashes or lesions    Lab:  CMP    Recent Labs  Lab 01/19/18  1819   CR 0.68   GFRESTIMATED >90   GFRESTBLACK >90     CBC    Recent Labs  Lab 01/21/18  0640 01/20/18  0640 01/19/18  1819   HGB 11.8* 12.6*  --    PLT  --   --  118*     INRNo lab results found in last 7 days.  Arterial Blood GasNo lab results found in last 7 days.     Intake/Output Summary (Last 24 hours) at 01/20/18 1459  Last data filed at 01/20/18 1239   Gross per 24 hour   Intake           6020.7 ml   Output             1575 ml   Net           4445.7 ml

## 2018-01-21 NOTE — PLAN OF CARE
"Problem: Hip Arthroplasty (Total, Partial) (Adult)  Goal: Signs and Symptoms of Listed Potential Problems Will be Absent, Minimized or Managed (Hip Arthroplasty)  Signs and symptoms of listed potential problems will be absent, minimized or managed by discharge/transition of care (reference Hip Arthroplasty (Total, Partial) (Adult) CPG).   Outcome: Improving  Patient had regular diet for breakfast this morning and tolerated it okay.  But mid-morning patient developed abdominal pains and nausea with vomiting.  Patient had zofran 4 mg IV and forty minutes later was given compazine 5 mg IV.  Patient had a total of 920 cc zamora emesis.  Dr. Paula was aware and ordered bowel regimen.  Pt given dulcolax suppository and resulted a medium formed brown stool.  Patient declined oral medications at that time.  Patient slept for approximately 2 1/2 hours.  Patient is up to chair and taking in clear liquids for supper.  Declined pain medications at this time.  Reports \"stomach is still flip flopping\" but reports \"nausea is better.\"      "

## 2018-01-21 NOTE — PROGRESS NOTES
"[facility] Orthopaedics Progress Note      Post-operative Day: 2 Days Post-Op    Procedure(s):  Right Hip Acetabulum Revision - Wound Class: I-Clean      Subjective:    Pain: moderate  Chest pain, SOB:  No      Objective:  Blood pressure 143/79, pulse 52, temperature 97.7  F (36.5  C), temperature source Oral, resp. rate 18, height 1.778 m (5' 10\"), weight 88.9 kg (196 lb), SpO2 93 %.    Patient Vitals for the past 24 hrs:   BP Temp Temp src Pulse Heart Rate Resp SpO2   01/21/18 0716 143/79 97.7  F (36.5  C) Oral - 69 18 93 %   01/21/18 0436 127/59 97.7  F (36.5  C) Oral - 53 18 93 %   01/20/18 2247 126/51 97.3  F (36.3  C) Oral 52 - 18 94 %   01/20/18 2041 120/62 97.1  F (36.2  C) Oral - 59 18 95 %   01/20/18 1608 (!) 116/92 97.1  F (36.2  C) Oral 74 - 18 -   01/20/18 1032 132/65 - - 54 - - 97 %       Wt Readings from Last 4 Encounters:   01/19/18 88.9 kg (196 lb)   07/07/15 93 kg (205 lb)   06/25/15 93 kg (205 lb)   05/17/15 90.7 kg (200 lb)         Motor function, sensation, and circulation intact   Yes  Wound status: incisions are clean dry and intact. Yes  Calf tenderness: Bilateral  No    Pertinent Labs   Lab Results: personally reviewed.     Recent Labs   Lab Test  01/21/18   0640  01/20/18   0640  01/19/18   1819  07/16/14   0520  07/15/14   1005  07/15/14   0525  07/14/14   0605  07/13/14   1940  06/20/13   0630   INR   --    --    --    --   1.14   --    --   1.16*  1.41*   HGB  11.8*  12.6*   --   13.3   --   14.1  13.8  15.3   --    HCT   --    --    --    --    --   40.6  40.5  44.1   --    MCV   --    --    --    --    --   92  94  93   --    PLT   --    --   118*   --    --   201  204  213   --    NA   --    --    --   134   --   134  136  137   --        Plan: Anticoagulation protocol: Lovenox inpatient and then  mg daily at discharge  x 42  days            Pain medications:  oxycodone            Weight bearing status:  WBAT            Disposition:  Stable             Continue cares and " rehabilitation     Report completed by:  David Reich MD  Date: 1/21/2018  Time: 10:12 AM

## 2018-01-21 NOTE — PROGRESS NOTES
SPIRITUAL HEALTH SERVICES  SPIRITUAL ASSESSMENT Progress Note  [facility] - Med/Surg    I provided follow up visit for pt, Romeo, after an initial visit pre-op.  Romeo stated he had just started vomiting.  He hadn't had that previously so it was not what he expected.  I concluded the visit.     Lopez Rodrigues M.A., [facility]  Staff   [facility]  Office: [phone]  Cell: [phone]  Pager [phone]

## 2018-01-21 NOTE — PLAN OF CARE
"Problem: Hip Arthroplasty (Total, Partial) (Adult)  Goal: Signs and Symptoms of Listed Potential Problems Will be Absent, Minimized or Managed (Hip Arthroplasty)  Signs and symptoms of listed potential problems will be absent, minimized or managed by discharge/transition of care (reference Hip Arthroplasty (Total, Partial) (Adult) CPG).   Outcome: Improving  Patient is taking dilaudid 4 mg po and tylenol scheduled for pain.  \"Comfortably managed\" at this time.  Ambulated in hallway this evening with this writer; walker/gait belt used.  Tolerating activity well.  Eating well, voiding well.  Passing gas; but NO BM since 1/18/18.  Given senokot this morning as ordered.      "

## 2018-01-22 ENCOUNTER — APPOINTMENT (OUTPATIENT)
Dept: OCCUPATIONAL THERAPY | Facility: CLINIC | Age: 78
DRG: 467 | End: 2018-01-22
Attending: ORTHOPAEDIC SURGERY
Payer: MEDICARE

## 2018-01-22 ENCOUNTER — APPOINTMENT (OUTPATIENT)
Dept: GENERAL RADIOLOGY | Facility: CLINIC | Age: 78
DRG: 467 | End: 2018-01-22
Attending: ORTHOPAEDIC SURGERY
Payer: MEDICARE

## 2018-01-22 ENCOUNTER — APPOINTMENT (OUTPATIENT)
Dept: PHYSICAL THERAPY | Facility: CLINIC | Age: 78
DRG: 467 | End: 2018-01-22
Attending: ORTHOPAEDIC SURGERY
Payer: MEDICARE

## 2018-01-22 PROBLEM — I10 BENIGN ESSENTIAL HYPERTENSION: Chronic | Status: ACTIVE | Noted: 2018-01-19

## 2018-01-22 PROBLEM — N40.0 BENIGN NON-NODULAR PROSTATIC HYPERPLASIA WITHOUT LOWER URINARY TRACT SYMPTOMS: Status: ACTIVE | Noted: 2017-03-15

## 2018-01-22 PROBLEM — N40.0 BENIGN NON-NODULAR PROSTATIC HYPERPLASIA WITHOUT LOWER URINARY TRACT SYMPTOMS: Chronic | Status: ACTIVE | Noted: 2017-03-15

## 2018-01-22 LAB
ANION GAP SERPL CALCULATED.3IONS-SCNC: 6 MMOL/L (ref 3–14)
BASOPHILS # BLD AUTO: 0 10E9/L (ref 0–0.2)
BASOPHILS NFR BLD AUTO: 0.2 %
BUN SERPL-MCNC: 14 MG/DL (ref 7–30)
CALCIUM SERPL-MCNC: 8.1 MG/DL (ref 8.5–10.1)
CHLORIDE SERPL-SCNC: 104 MMOL/L (ref 94–109)
CO2 SERPL-SCNC: 28 MMOL/L (ref 20–32)
CREAT SERPL-MCNC: 0.71 MG/DL (ref 0.66–1.25)
DIFFERENTIAL METHOD BLD: ABNORMAL
EOSINOPHIL # BLD AUTO: 0.1 10E9/L (ref 0–0.7)
EOSINOPHIL NFR BLD AUTO: 0.9 %
ERYTHROCYTE [DISTWIDTH] IN BLOOD BY AUTOMATED COUNT: 14.8 % (ref 10–15)
GFR SERPL CREATININE-BSD FRML MDRD: >90 ML/MIN/1.7M2
GLUCOSE BLDC GLUCOMTR-MCNC: 136 MG/DL (ref 70–99)
GLUCOSE BLDC GLUCOMTR-MCNC: 149 MG/DL (ref 70–99)
GLUCOSE BLDC GLUCOMTR-MCNC: 149 MG/DL (ref 70–99)
GLUCOSE BLDC GLUCOMTR-MCNC: 159 MG/DL (ref 70–99)
GLUCOSE SERPL-MCNC: 158 MG/DL (ref 70–99)
HBA1C MFR BLD: 7.2 % (ref 4.3–6)
HCT VFR BLD AUTO: 36.8 % (ref 40–53)
HGB BLD-MCNC: 12.1 G/DL (ref 13.3–17.7)
IMM GRANULOCYTES # BLD: 0 10E9/L (ref 0–0.4)
IMM GRANULOCYTES NFR BLD: 0.3 %
LYMPHOCYTES # BLD AUTO: 2.4 10E9/L (ref 0.8–5.3)
LYMPHOCYTES NFR BLD AUTO: 18.6 %
MCH RBC QN AUTO: 30.2 PG (ref 26.5–33)
MCHC RBC AUTO-ENTMCNC: 32.9 G/DL (ref 31.5–36.5)
MCV RBC AUTO: 92 FL (ref 78–100)
MONOCYTES # BLD AUTO: 1.4 10E9/L (ref 0–1.3)
MONOCYTES NFR BLD AUTO: 10.6 %
NEUTROPHILS # BLD AUTO: 8.9 10E9/L (ref 1.6–8.3)
NEUTROPHILS NFR BLD AUTO: 69.4 %
PLATELET # BLD AUTO: 135 10E9/L (ref 150–450)
POTASSIUM SERPL-SCNC: 4.4 MMOL/L (ref 3.4–5.3)
RBC # BLD AUTO: 4.01 10E12/L (ref 4.4–5.9)
SODIUM SERPL-SCNC: 138 MMOL/L (ref 133–144)
WBC # BLD AUTO: 12.9 10E9/L (ref 4–11)

## 2018-01-22 PROCEDURE — 83036 HEMOGLOBIN GLYCOSYLATED A1C: CPT | Performed by: PHYSICIAN ASSISTANT

## 2018-01-22 PROCEDURE — 25000128 H RX IP 250 OP 636: Performed by: FAMILY MEDICINE

## 2018-01-22 PROCEDURE — 25000128 H RX IP 250 OP 636: Performed by: PHYSICIAN ASSISTANT

## 2018-01-22 PROCEDURE — 99233 SBSQ HOSP IP/OBS HIGH 50: CPT | Performed by: PHYSICIAN ASSISTANT

## 2018-01-22 PROCEDURE — 00000146 ZZHCL STATISTIC GLUCOSE BY METER IP

## 2018-01-22 PROCEDURE — 36415 COLL VENOUS BLD VENIPUNCTURE: CPT | Performed by: PHYSICIAN ASSISTANT

## 2018-01-22 PROCEDURE — 25000132 ZZH RX MED GY IP 250 OP 250 PS 637: Mod: GY | Performed by: FAMILY MEDICINE

## 2018-01-22 PROCEDURE — 40000133 ZZH STATISTIC OT WARD VISIT

## 2018-01-22 PROCEDURE — A9270 NON-COVERED ITEM OR SERVICE: HCPCS | Mod: GY | Performed by: FAMILY MEDICINE

## 2018-01-22 PROCEDURE — 25000125 ZZHC RX 250: Performed by: ORTHOPAEDIC SURGERY

## 2018-01-22 PROCEDURE — 85025 COMPLETE CBC W/AUTO DIFF WBC: CPT | Performed by: PHYSICIAN ASSISTANT

## 2018-01-22 PROCEDURE — 25000128 H RX IP 250 OP 636: Performed by: ORTHOPAEDIC SURGERY

## 2018-01-22 PROCEDURE — 74019 RADEX ABDOMEN 2 VIEWS: CPT

## 2018-01-22 PROCEDURE — 80048 BASIC METABOLIC PNL TOTAL CA: CPT | Performed by: PHYSICIAN ASSISTANT

## 2018-01-22 PROCEDURE — A9270 NON-COVERED ITEM OR SERVICE: HCPCS | Mod: GY | Performed by: ORTHOPAEDIC SURGERY

## 2018-01-22 PROCEDURE — 12000000 ZZH R&B MED SURG/OB

## 2018-01-22 PROCEDURE — 97110 THERAPEUTIC EXERCISES: CPT | Mod: GP | Performed by: PHYSICAL THERAPIST

## 2018-01-22 PROCEDURE — A9270 NON-COVERED ITEM OR SERVICE: HCPCS | Mod: GY | Performed by: PHYSICIAN ASSISTANT

## 2018-01-22 PROCEDURE — 25000131 ZZH RX MED GY IP 250 OP 636 PS 637: Mod: GY | Performed by: PHYSICIAN ASSISTANT

## 2018-01-22 PROCEDURE — 97535 SELF CARE MNGMENT TRAINING: CPT | Mod: GO

## 2018-01-22 PROCEDURE — 25000132 ZZH RX MED GY IP 250 OP 250 PS 637: Mod: GY | Performed by: ORTHOPAEDIC SURGERY

## 2018-01-22 PROCEDURE — 40000193 ZZH STATISTIC PT WARD VISIT: Performed by: PHYSICAL THERAPIST

## 2018-01-22 PROCEDURE — 25000132 ZZH RX MED GY IP 250 OP 250 PS 637: Mod: GY | Performed by: PHYSICIAN ASSISTANT

## 2018-01-22 RX ORDER — KETOROLAC TROMETHAMINE 30 MG/ML
30 INJECTION, SOLUTION INTRAMUSCULAR; INTRAVENOUS ONCE
Status: COMPLETED | OUTPATIENT
Start: 2018-01-22 | End: 2018-01-22

## 2018-01-22 RX ORDER — NICOTINE POLACRILEX 4 MG
15-30 LOZENGE BUCCAL
Status: DISCONTINUED | OUTPATIENT
Start: 2018-01-22 | End: 2018-01-25 | Stop reason: HOSPADM

## 2018-01-22 RX ORDER — SODIUM CHLORIDE 9 MG/ML
INJECTION, SOLUTION INTRAVENOUS CONTINUOUS
Status: DISCONTINUED | OUTPATIENT
Start: 2018-01-22 | End: 2018-01-24

## 2018-01-22 RX ORDER — DEXTROSE MONOHYDRATE 25 G/50ML
25-50 INJECTION, SOLUTION INTRAVENOUS
Status: DISCONTINUED | OUTPATIENT
Start: 2018-01-22 | End: 2018-01-25 | Stop reason: HOSPADM

## 2018-01-22 RX ADMIN — KETOROLAC TROMETHAMINE 30 MG: 30 INJECTION, SOLUTION INTRAMUSCULAR at 04:27

## 2018-01-22 RX ADMIN — SODIUM CHLORIDE: 9 INJECTION, SOLUTION INTRAVENOUS at 19:39

## 2018-01-22 RX ADMIN — HYDROMORPHONE HYDROCHLORIDE 0.5 MG: 1 INJECTION, SOLUTION INTRAMUSCULAR; INTRAVENOUS; SUBCUTANEOUS at 04:06

## 2018-01-22 RX ADMIN — SENNOSIDES AND DOCUSATE SODIUM 2 TABLET: 8.6; 5 TABLET ORAL at 19:40

## 2018-01-22 RX ADMIN — SENNOSIDES AND DOCUSATE SODIUM 2 TABLET: 8.6; 5 TABLET ORAL at 08:31

## 2018-01-22 RX ADMIN — INSULIN ASPART 1 UNITS: 100 INJECTION, SOLUTION INTRAVENOUS; SUBCUTANEOUS at 12:15

## 2018-01-22 RX ADMIN — OMEPRAZOLE 40 MG: 20 CAPSULE, DELAYED RELEASE ORAL at 19:40

## 2018-01-22 RX ADMIN — LOSARTAN POTASSIUM 50 MG: 50 TABLET, FILM COATED ORAL at 08:31

## 2018-01-22 RX ADMIN — METOPROLOL TARTRATE 25 MG: 25 TABLET ORAL at 19:40

## 2018-01-22 RX ADMIN — SODIUM CHLORIDE: 9 INJECTION, SOLUTION INTRAVENOUS at 08:19

## 2018-01-22 RX ADMIN — ENOXAPARIN SODIUM 40 MG: 40 INJECTION SUBCUTANEOUS at 14:41

## 2018-01-22 RX ADMIN — ONDANSETRON 4 MG: 2 INJECTION INTRAMUSCULAR; INTRAVENOUS at 12:27

## 2018-01-22 RX ADMIN — METOPROLOL TARTRATE 25 MG: 25 TABLET ORAL at 08:31

## 2018-01-22 RX ADMIN — ONDANSETRON 4 MG: 4 TABLET, ORALLY DISINTEGRATING ORAL at 04:01

## 2018-01-22 RX ADMIN — PROCHLORPERAZINE MALEATE 5 MG: 5 TABLET, FILM COATED ORAL at 01:28

## 2018-01-22 RX ADMIN — OXYBUTYNIN CHLORIDE 10 MG: 5 TABLET, EXTENDED RELEASE ORAL at 08:31

## 2018-01-22 RX ADMIN — INSULIN ASPART 1 UNITS: 100 INJECTION, SOLUTION INTRAVENOUS; SUBCUTANEOUS at 14:42

## 2018-01-22 RX ADMIN — AMLODIPINE BESYLATE 5 MG: 5 TABLET ORAL at 08:31

## 2018-01-22 RX ADMIN — INSULIN ASPART 1 UNITS: 100 INJECTION, SOLUTION INTRAVENOUS; SUBCUTANEOUS at 08:43

## 2018-01-22 RX ADMIN — PROCHLORPERAZINE EDISYLATE 5 MG: 5 INJECTION INTRAMUSCULAR; INTRAVENOUS at 16:37

## 2018-01-22 NOTE — OP NOTE
PROCEDURE DATE:  January 19, 2018      PREOPERATIVE DIAGNOSIS:  Failed right total hip arthroplasty.      POSTOPERATIVE DIAGNOSIS:  Failed right total hip arthroplasty.      PROCEDURE:  Two component revision right total hip arthroplasty.      COMPONENTS:  Acetabulum 52 mm outside diameter [place] ingrowth with Gription.  The liner is a neutral ALRTX.  The head is +5/36 mm cobalt chrome.      SURGEON:  David Reich MD      ASSISTANT:  Raghav Hall PA-C      ANESTHESIA:  General endotracheal/spinal.      ESTIMATED BLOOD LOSS:  100 mL      COMPLICATIONS:  None apparent.      INDICATIONS:  Bryson is an extremely pleasant 77-year-old gentleman, I placed a total hip arthroplasty many years ago.  The patient apparently had a fall.  X-rays were consistent with a loosened acetabular component.  Alternatives, risks, and possible complications were very carefully discussed prior to obtaining operative consent.      OPERATION:  The patient was brought to the main operating room after spinal anesthesia was given, he was tested, but not noted to have great analgesia.  Therefore, a general anesthetic was administered.  He was placed in a left lateral decubitus position.  Two grams of Ancef were given intravenously.  The right lower extremity was prepped and draped in the usual sterile fashion.        Old scar was reopened with a 10-blade.  Subcutaneous tissue divided with cautery, deep fascia incised in line with the incision.  Deep fascia was incised as noted.  East-West retractor was placed.      We then excised the deep capsule and scar tissue and easily identifying the cup and stem.  I then resected out the soft tissue and metalosis in the area.  I was able to dislocate the femoral head without difficulty, likewise removed the head with a drift.  Stem was checked and noted to be solid.      I then performed soft tissue releases around the acetabulum until I was able to move the femoral stem anteriorly.  A curved  "\"melendez's crook\" retractor was placed, holding it over nicely, as well as a large Hohmann posteriorly, being careful to protect the sciatic nerve.      We then subsequently removed the polythene liner, noted it to have somewhat deformity.  It was also obvious that the cup was grossly loose.  Once the liner was out, I was able to remove the screw in its entirety without difficulty, and likewise removed the cup without difficulty.      With retractors remaining in place, we then reamed the cup, beautiful circumferential solid bone at 51 mm.  This definitely surprised me.  I thought it was going to go to 54. .  Therefore, I gently reamed to 52 on the rim and selected a 3-hole, 52 mm cup.  This was then impacted into place with a very solid resounding fit.  Removed the impactor and threaded a trial liner into place.      We then performed trial reduction, elected to go with the +5 head/neck combination.  Therefore, removed trial components, pulse-lavaged surfaces clean.  Then impacted the liner into place, verifying that all tabs had seated appropriately.  We again performed trial reduction, elected to go with the +5 head/neck combination.  The ball was impacted, had a clean Moncada taper, and the hip was reduced.      Shuck test was appropriate.  Leg lengths were equal.  I was unable to dislocate in extension and external rotation, 90 degrees of flexion, 20 degrees of adduction.  He was right at 90 degrees of internal rotation before it began to ride out of the cup.  Therefore, final concentric reduction was performed, pulse-lavaged surfaces clean, and soaked in weak Betadine solution.  I then removed East-West retractor, closed deep fascia #1 Stratafix, closed subcutaneous tissue with 2-0 Stratafix, completed closure with 3-0 Stratafix.  Prineo was placed on the wound following sterile compression bandage and a hip abduction pillow.  The patient was turned supine, awoken, extubated, and returned to PAR in stable " condition, without apparent complication.         KINA TEAGUE MD             D: 2018 16:02   T: 2018 10:25   MT: NTS      Name:     CRIS ANDERSON   MRN:      [id number]        Account:        [id number]   :      1940           Procedure Date: 2018      Document: [id number]       cc: [facility]

## 2018-01-22 NOTE — PROGRESS NOTES
"[facility] Medicine Progress Note  Date of Service: 01/22/2018    Assessment & Plan   Bryson Mendez is a 77 year old male who presented on 1/19/2018 for scheduled Procedure(s):  ARTHROPLASTY REVISION HIP by David Reich MD and is being followed by the hospital medicine service for co-management of acute and/or chronic perioperative medical problems.    S/p Procedure(s):  ARTHROPLASTY REVISION HIP  - POD #3  - pain control, wound cares, physical therapy, occupational therapy and DVT prophylaxis per orthopedic surgery service    Post Operative Ileus  Developed 01/21 (afternoon of POD #2). Overnight, worsening pain and distention.  Had emesis x2.  Received Toradol and states that he felt \"much better\" after administration. XR shows: \"Multiple dilated small bowel loops with air-fluid levels. This may be due to mechanical small bowel obstruction or possibly ileus.\"  - place NG tube  - NPO with IVF  - pain control with dilaudid  - BMP in AM given NG tube    CAD (coronary artery disease)  Acute anterior MI 07/2017 requiring electric shock.  Underwent stent x2  - hold PTA aspirin, per orthopedic service      MAIDA (obstructive sleep apnea)  Continue home CPAP      A-fib (H)  Not on anticoagulant. S/p ablation 07/2014  - continue PTA metoprolol.      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  BG have been elevated while inpatient.  Sliding scale was not ordered on admit  - start NPO medium sliding scale insulin  - hyper/hypoglycemia protocol  - add on A1c      Mixed hyperlipidemia  - continue PTA simvastatin      DVT Prophylaxis: as per orthopedic surgery service " "- Defer to primary service  Code Status: Full Code    Lines: PIV, without edema/erythema   Ordonez catheter: not indicated    Discussion: Medically, the patient appears stable.  However now with probable post operative ileus.  Will make NPO and anticipate that he will require at least 1-2 additional days of inpatient care.    Disposition: Anticipate discharge in 1-2 additional days.     Attestation:  I have reviewed today's vital signs, notes, medications, labs and imaging.    Sheyla Means PA-C      Interval History   Pain to right hip is well controlled.  Unfortunately, developed nausea, vomiting, abdominal distention and abdominal pain yesterday afternoon at approximately 4pm.  This slowly worsened overnight.  Minimal interventions such as ambulation did not improve distention.  [place] as though Toradol greatly improved his pain.  Last flatus/bowel movement was 01/21 at approximately 4pm.  States that this was a \"loose\" BM with some formed stool.  Voiding freely.  NO previous abdominal surgeries.  No previous history of SBO per patient.    Otherwise denies fever, chills, CP, SOB, cough, dysuria, and LE pain.  The patient has chronic lower extremities swelling to bilateral legs; however, right is typically greater than left.  States that swelling is perhaps slightly improved as compared to baseline.  Some chronic pain to bilateral LE.    Physical Exam   Temp:  [97.4  F (36.3  C)-98.5  F (36.9  C)] 98.5  F (36.9  C)  Pulse:  [59-68] 59  Heart Rate:  [54-60] 60  Resp:  [18] 18  BP: (124-155)/(64-81) 140/64  SpO2:  [94 %-95 %] 95 %    Weights:   Vitals:    01/19/18 1235   Weight: 88.9 kg (196 lb)    Body mass index is 28.12 kg/(m^2).    General: alert and oriented x4, in no acute distress  CV: Regular rate/rhythm, no appreciable murmur, rub, or gallop  Respiratory: CTA bilaterally, equal chest expansion  GI: Significant abdominal distention.  No rebound tenderness.  Slightly tender to LLQ. No voluntary guarding.  No " evidence of peritoneal tenderness. Markedly hypoactive BS  Skin: Warm and dry  Musculoskeletal: negative homans bilaterally.  Non-tender to palpation of posterior calves. 2-3+ edema to lower extremities.  Neuro: equal  strength    Data     Recent Labs  Lab 01/22/18  0715 01/21/18  0640 01/20/18  0640 01/19/18  1819   WBC 12.9*  --   --   --    HGB 12.1* 11.8* 12.6*  --    MCV 92  --   --   --    *  --   --  118*     --   --   --    POTASSIUM 4.4  --   --   --    CHLORIDE 104  --   --   --    CO2 28  --   --   --    BUN 14  --   --   --    CR 0.71  --   --  0.68   ANIONGAP 6  --   --   --    CHERI 8.1*  --   --   --    *  --   --   --          Recent Labs  Lab 01/22/18  0715 01/21/18  0804 01/20/18  0800   *  --   --    BGM  --  158* 258*        Unresulted Labs Ordered in the Past 30 Days of this Admission     No orders found from 11/20/2017 to 1/20/2018.           Imaging  Recent Results (from the past 24 hour(s))   XR Abdomen 2 Views    Narrative    XR ABDOMEN 2 VW  1/22/2018 4:46 AM     INDICATION: Increased abdominal pain, distended abdomen.    COMPARISON: CT 7/14/2014.      Impression    IMPRESSION: Multiple dilated small bowel loops with air-fluid levels.  This may be due to mechanical small bowel obstruction or possibly  ileus.    HE ARGUETA MD        I reviewed all new labs and imaging results over the last 24 hours. I personally reviewed the abdominal x-ray image(s) showing multiple air fluid levels.    Medications     NaCl 75 mL/hr at 01/22/18 0819       insulin aspart  1-6 Units Subcutaneous Q4H     amLODIPine  5 mg Oral Daily     metoprolol tartrate  25 mg Oral BID     sodium chloride (PF)  3 mL Intracatheter Q8H     enoxaparin  40 mg Subcutaneous Q24H     acetaminophen  975 mg Oral Q8H     senna-docusate  1-2 tablet Oral BID     omeprazole  40 mg Oral QPM     oxybutynin  10 mg Oral Daily     simvastatin  20 mg Oral At Bedtime     losartan  50 mg Oral Daily       Sheyla  CARMELA Means

## 2018-01-22 NOTE — PLAN OF CARE
Problem: Hip Arthroplasty (Total, Partial) (Adult)  Goal: Signs and Symptoms of Listed Potential Problems Will be Absent, Minimized or Managed (Hip Arthroplasty)  Signs and symptoms of listed potential problems will be absent, minimized or managed by discharge/transition of care (reference Hip Arthroplasty (Total, Partial) (Adult) CPG).   Outcome: Improving  Had 100 cc brown emesis post zofran and essential oils.  Brought in compazine and he declined.  Re-offered NG tube and he declined.  Stated he will tell us when and if he is willing.  Has been taking multiple walks in [place] with wife and staff.  Voiding.  Denies passing gas today, bowel sounds not audible on am assessment, abdomen distended and firm.  Has been sitting up in chair.  Denies pain to right hip and refuses scheduled tylenol.  Reports pain in abdomen and reports he does not need any pain medication.

## 2018-01-22 NOTE — PROGRESS NOTES
"[facility] Orthopaedics Progress Note      Post-operative Day: 3 Days Post-Op    Procedure(s):  Right Hip Acetabulum Revision - Wound Class: I-Clean      Subjective:    Pain: minimal  Chest pain, SOB:  No      Objective:  Blood pressure 140/64, pulse 59, temperature 98.5  F (36.9  C), temperature source Oral, resp. rate 18, height 1.778 m (5' 10\"), weight 88.9 kg (196 lb), SpO2 95 %.    Patient Vitals for the past 24 hrs:   BP Temp Temp src Pulse Heart Rate Resp SpO2   01/22/18 0723 140/64 98.5  F (36.9  C) Oral - 60 18 95 %   01/21/18 2253 150/72 - - - 54 18 95 %   01/21/18 1957 145/70 - - 59 - 18 -   01/21/18 1533 124/67 97.5  F (36.4  C) Oral - 55 18 94 %   01/21/18 1116 155/81 97.4  F (36.3  C) Oral 68 - 18 94 %       Wt Readings from Last 4 Encounters:   01/19/18 88.9 kg (196 lb)   07/07/15 93 kg (205 lb)   06/25/15 93 kg (205 lb)   05/17/15 90.7 kg (200 lb)         Motor function, sensation, and circulation intact   Yes  Wound status: incisions are clean dry and intact. Yes  Calf tenderness: Bilateral  No    Pertinent Labs   Lab Results: personally reviewed.     Recent Labs   Lab Test  01/22/18   0715  01/21/18   0640  01/20/18   0640  01/19/18   1819  07/16/14   0520  07/15/14   1005  07/15/14   0525  07/14/14   0605  07/13/14   1940  06/20/13   0630   INR   --    --    --    --    --   1.14   --    --   1.16*  1.41*   HGB  12.1*  11.8*  12.6*   --   13.3   --   14.1  13.8  15.3   --    HCT  36.8*   --    --    --    --    --   40.6  40.5  44.1   --    MCV  92   --    --    --    --    --   92  94  93   --    PLT  135*   --    --   118*   --    --   201  204  213   --    NA   --    --    --    --   134   --   134  136  137   --        Plan: Anticoagulation protocol: Lovenox inpatient and then  mg daily at discharge  x 42  days            Pain medications:  tylenol            Weight bearing status:  WBAT            Disposition:  D/C Home if bowel issue clears.  po qdx42 days. F/U 2 weeks         "     Continue cares and rehabilitation     Report completed by:  David Reich MD  Date: 1/22/2018  Time: 7:45 AM

## 2018-01-22 NOTE — PROGRESS NOTES
Attempted NG by this writer x 1 and charge RN x 1-both attempts had resistance around 35 cm deep.  Patient refusing further attempts stating I have never refused anything, but I am not allowing this again.  Page sent to Sheyla PRITCHARD regarding refusal.

## 2018-01-22 NOTE — PLAN OF CARE
Problem: Patient Care Overview  Goal: Plan of Care/Patient Progress Review  Discharge Planner OT   Patient plan for discharge: Return home.  Current status: Pt supine in bed on arrival. Was independent for sit to stand. SBA with FWW for ambulation to/from toilet, toilet transfer, standing G/H, and stand to sit in chair. Educated on hip precautions and LE dressing with AE. Pt verbally understood precautions and demonstrated AE dressing.  Barriers to return to prior living situation: None  Recommendations for discharge: Home with assistance.  Rationale for recommendations: Pt SBA with ADL/IADLs, will have assistance from spouse upon return home.       Entered by: Luis Skaggs 01/22/2018 9:13 AM     Occupational Therapy Discharge Summary    Reason for therapy discharge:    All goals and outcomes met, no further needs identified.    Progress towards therapy goal(s). See goals on Care Plan in [facility] electronic health record for goal details.  Goals met    Therapy recommendation(s):    No further therapy is recommended.

## 2018-01-22 NOTE — PROGRESS NOTES
7:43 AM  Notified about pt's abd pain and distention about 4:30 AM.  Flat and upright ordered.  It shows obstruction vs ileus  Pt currently npo.  I called Sheyla PRITCHARD who is rounding on the pt today.  She is aware and pt is NPO.

## 2018-01-22 NOTE — PLAN OF CARE
Problem: Patient Care Overview  Goal: Plan of Care/Patient Progress Review  Discharge Planner PT   Patient plan for discharge: Home, no services  Current functional status: close supervision using FWW for ambulation x10min in hallway; supervision/mod-A for bed mobility and transfers  Barriers to return to prior living situation: none identified  Recommendations for discharge: home, no services  Rationale for recommendations: No current concerns, safety concerns, or impulsive concerns       Entered by: Agnes Avalos 01/22/2018 11:49 AM

## 2018-01-22 NOTE — PLAN OF CARE
Patient continues to be nauseated despite Zofran, Compazine  IV infiltrated, New IV SL restarted  Patient having increased abdominal pain & distention  Dr. Hernandez on call notified  30 mg IV Toradol X1 administered.  Patient down to xray for flat & upright abdomen

## 2018-01-22 NOTE — PROGRESS NOTES
Paged PA-C to discuss concerns of nausea/vomiting/hypoactive bowel sounds.   Response: No xray now, notify MD overnight with any new abdominal pain onset.

## 2018-01-22 NOTE — PLAN OF CARE
Problem: Hip Arthroplasty (Total, Partial) (Adult)  Goal: Signs and Symptoms of Listed Potential Problems Will be Absent, Minimized or Managed (Hip Arthroplasty)  Signs and symptoms of listed potential problems will be absent, minimized or managed by discharge/transition of care (reference Hip Arthroplasty (Total, Partial) (Adult) CPG).   Outcome: Improving  Patient up ambulating in halls during nite.  Emesis again,  Oral Compazine administered  Denies pain or increased pain & refused scheduled Tylenol.

## 2018-01-23 LAB
ANION GAP SERPL CALCULATED.3IONS-SCNC: 4 MMOL/L (ref 3–14)
BASOPHILS # BLD AUTO: 0 10E9/L (ref 0–0.2)
BASOPHILS NFR BLD AUTO: 0.2 %
BUN SERPL-MCNC: 12 MG/DL (ref 7–30)
CALCIUM SERPL-MCNC: 8 MG/DL (ref 8.5–10.1)
CHLORIDE SERPL-SCNC: 107 MMOL/L (ref 94–109)
CO2 SERPL-SCNC: 26 MMOL/L (ref 20–32)
CREAT SERPL-MCNC: 0.68 MG/DL (ref 0.66–1.25)
DIFFERENTIAL METHOD BLD: ABNORMAL
EOSINOPHIL # BLD AUTO: 0.3 10E9/L (ref 0–0.7)
EOSINOPHIL NFR BLD AUTO: 2.1 %
ERYTHROCYTE [DISTWIDTH] IN BLOOD BY AUTOMATED COUNT: 14.7 % (ref 10–15)
GFR SERPL CREATININE-BSD FRML MDRD: >90 ML/MIN/1.7M2
GLUCOSE BLDC GLUCOMTR-MCNC: 114 MG/DL (ref 70–99)
GLUCOSE BLDC GLUCOMTR-MCNC: 123 MG/DL (ref 70–99)
GLUCOSE BLDC GLUCOMTR-MCNC: 123 MG/DL (ref 70–99)
GLUCOSE BLDC GLUCOMTR-MCNC: 134 MG/DL (ref 70–99)
GLUCOSE BLDC GLUCOMTR-MCNC: 143 MG/DL (ref 70–99)
GLUCOSE SERPL-MCNC: 121 MG/DL (ref 70–99)
HCT VFR BLD AUTO: 36.4 % (ref 40–53)
HGB BLD-MCNC: 12 G/DL (ref 13.3–17.7)
IMM GRANULOCYTES # BLD: 0 10E9/L (ref 0–0.4)
IMM GRANULOCYTES NFR BLD: 0.2 %
LYMPHOCYTES # BLD AUTO: 3.5 10E9/L (ref 0.8–5.3)
LYMPHOCYTES NFR BLD AUTO: 26.4 %
MCH RBC QN AUTO: 30 PG (ref 26.5–33)
MCHC RBC AUTO-ENTMCNC: 33 G/DL (ref 31.5–36.5)
MCV RBC AUTO: 91 FL (ref 78–100)
MONOCYTES # BLD AUTO: 1.2 10E9/L (ref 0–1.3)
MONOCYTES NFR BLD AUTO: 9.5 %
NEUTROPHILS # BLD AUTO: 8.1 10E9/L (ref 1.6–8.3)
NEUTROPHILS NFR BLD AUTO: 61.6 %
PLATELET # BLD AUTO: 148 10E9/L (ref 150–450)
POTASSIUM SERPL-SCNC: 4 MMOL/L (ref 3.4–5.3)
RBC # BLD AUTO: 4 10E12/L (ref 4.4–5.9)
SODIUM SERPL-SCNC: 137 MMOL/L (ref 133–144)
TROPONIN I SERPL-MCNC: 0.02 UG/L (ref 0–0.04)
TROPONIN I SERPL-MCNC: <0.015 UG/L (ref 0–0.04)
WBC # BLD AUTO: 13.1 10E9/L (ref 4–11)

## 2018-01-23 PROCEDURE — 36415 COLL VENOUS BLD VENIPUNCTURE: CPT | Performed by: PHYSICIAN ASSISTANT

## 2018-01-23 PROCEDURE — 80048 BASIC METABOLIC PNL TOTAL CA: CPT | Performed by: PHYSICIAN ASSISTANT

## 2018-01-23 PROCEDURE — 00000146 ZZHCL STATISTIC GLUCOSE BY METER IP

## 2018-01-23 PROCEDURE — 99233 SBSQ HOSP IP/OBS HIGH 50: CPT | Performed by: PHYSICIAN ASSISTANT

## 2018-01-23 PROCEDURE — 25000132 ZZH RX MED GY IP 250 OP 250 PS 637: Mod: GY | Performed by: FAMILY MEDICINE

## 2018-01-23 PROCEDURE — 25000132 ZZH RX MED GY IP 250 OP 250 PS 637: Mod: GY | Performed by: PHYSICIAN ASSISTANT

## 2018-01-23 PROCEDURE — A9270 NON-COVERED ITEM OR SERVICE: HCPCS | Mod: GY | Performed by: FAMILY MEDICINE

## 2018-01-23 PROCEDURE — 93005 ELECTROCARDIOGRAM TRACING: CPT

## 2018-01-23 PROCEDURE — A9270 NON-COVERED ITEM OR SERVICE: HCPCS | Mod: GY | Performed by: PHYSICIAN ASSISTANT

## 2018-01-23 PROCEDURE — 85025 COMPLETE CBC W/AUTO DIFF WBC: CPT | Performed by: PHYSICIAN ASSISTANT

## 2018-01-23 PROCEDURE — 25000128 H RX IP 250 OP 636: Performed by: PHYSICIAN ASSISTANT

## 2018-01-23 PROCEDURE — A9270 NON-COVERED ITEM OR SERVICE: HCPCS | Mod: GY | Performed by: ORTHOPAEDIC SURGERY

## 2018-01-23 PROCEDURE — 25000128 H RX IP 250 OP 636: Performed by: ORTHOPAEDIC SURGERY

## 2018-01-23 PROCEDURE — 12000007 ZZH R&B INTERMEDIATE

## 2018-01-23 PROCEDURE — 84484 ASSAY OF TROPONIN QUANT: CPT | Performed by: PHYSICIAN ASSISTANT

## 2018-01-23 PROCEDURE — 25000132 ZZH RX MED GY IP 250 OP 250 PS 637: Mod: GY | Performed by: ORTHOPAEDIC SURGERY

## 2018-01-23 RX ORDER — ACETAMINOPHEN 325 MG/1
650 TABLET ORAL EVERY 4 HOURS PRN
Qty: 100 TABLET | Refills: 0 | Status: SHIPPED | OUTPATIENT
Start: 2018-01-23

## 2018-01-23 RX ORDER — AMOXICILLIN 250 MG
1 CAPSULE ORAL 2 TIMES DAILY PRN
Qty: 100 TABLET | Refills: 0 | COMMUNITY
Start: 2018-01-23 | End: 2020-09-05

## 2018-01-23 RX ADMIN — LOSARTAN POTASSIUM 50 MG: 50 TABLET, FILM COATED ORAL at 08:00

## 2018-01-23 RX ADMIN — AMLODIPINE BESYLATE 5 MG: 5 TABLET ORAL at 08:00

## 2018-01-23 RX ADMIN — OXYBUTYNIN CHLORIDE 10 MG: 5 TABLET, EXTENDED RELEASE ORAL at 08:34

## 2018-01-23 RX ADMIN — METOPROLOL TARTRATE 25 MG: 25 TABLET ORAL at 08:00

## 2018-01-23 RX ADMIN — SIMVASTATIN 20 MG: 20 TABLET, FILM COATED ORAL at 20:42

## 2018-01-23 RX ADMIN — SENNOSIDES AND DOCUSATE SODIUM 2 TABLET: 8.6; 5 TABLET ORAL at 08:00

## 2018-01-23 RX ADMIN — SODIUM CHLORIDE: 9 INJECTION, SOLUTION INTRAVENOUS at 08:34

## 2018-01-23 RX ADMIN — METOPROLOL TARTRATE 25 MG: 25 TABLET ORAL at 20:42

## 2018-01-23 RX ADMIN — ENOXAPARIN SODIUM 40 MG: 40 INJECTION SUBCUTANEOUS at 14:22

## 2018-01-23 RX ADMIN — OMEPRAZOLE 40 MG: 20 CAPSULE, DELAYED RELEASE ORAL at 20:42

## 2018-01-23 NOTE — PROGRESS NOTES
"[facility] Orthopaedics Progress Note      Post-operative Day: 4 Days Post-Op    Procedure(s):  Right Hip Acetabulum Revision - Wound Class: I-Clean      Subjective:    Pain: minimal  aching to R hip. Denies shooting pain, parasthesias, numbness and weakness.   denies Chest pain, SOB, O2 required: None  denies nausea/ emesis  denies lightheadedness, dizziness and weakness  BM + small, passing gas +. Urinating well, Ordonez in place: no.       Objective:  Blood pressure 126/54, pulse 74, temperature 98.3  F (36.8  C), temperature source Oral, resp. rate 16, height 1.778 m (5' 10\"), weight 93.8 kg (206 lb 12.7 oz), SpO2 96 %.    Patient Vitals for the past 24 hrs:   BP Temp Temp src Pulse Heart Rate Resp SpO2 Weight   01/23/18 0735 126/54 98.3  F (36.8  C) Oral 74 - 16 96 % -   01/23/18 0256 - - - - - - - 93.8 kg (206 lb 12.7 oz)   01/22/18 2357 120/54 98.7  F (37.1  C) Oral - 90 16 94 % -   01/22/18 1911 (!) 172/92 98.6  F (37  C) Oral - 93 16 95 % -   01/22/18 1630 166/80 98.6  F (37  C) Oral 75 - 18 95 % -   01/22/18 1110 148/75 97.9  F (36.6  C) Oral 71 - 18 97 % -       Wt Readings from Last 4 Encounters:   01/23/18 93.8 kg (206 lb 12.7 oz)   07/07/15 93 kg (205 lb)   06/25/15 93 kg (205 lb)   05/17/15 90.7 kg (200 lb)     General: Alert and orientated. No apparent distress. Non-labored breathing. Appropriate affect.   MSK: R hip: dressing Clean, dry, and intact. Skin intact, yes ecchymosis, no erythema. nontender to palpation to incision site. ROM appropriate for post op. Distal neurovascularly intact. Compartments soft and non-tender. No calf pain. Homans negative. PF/DF and EHL intact.       Pertinent Labs   Lab Results: personally reviewed.     Recent Labs   Lab Test  01/23/18   0700  01/22/18   0715  01/21/18   0640   01/19/18   1819  07/16/14   0520  07/15/14   1005  07/15/14   0525   07/13/14   1940  06/20/13   0630   INR   --    --    --    --    --    --   1.14   --    --   1.16*  1.41*   HGB  12.0*  " 12.1*  11.8*   < >   --   13.3   --   14.1   < >  15.3   --    HCT  36.4*  36.8*   --    --    --    --    --   40.6   < >  44.1   --    MCV  91  92   --    --    --    --    --   92   < >  93   --    PLT  148*  135*   --    --   118*   --    --   201   < >  213   --    NA  137  138   --    --    --   134   --   134   < >  137   --     < > = values in this interval not displayed.         Procedure(s):  Right Hip Acetabulum Revision - Wound Class: I-Clean  Plan: Anticoagulation protocol: Lovenox inpatient and then  mg daily at discharge  x 42  days            Pain medications:  tylenol            Weight bearing status:  WBAT            Dressing Change: dry dressing change with gauze. Prineo to remain intact until follow up.             Disposition:  Home likely tomorrow, dispo determined when medically stable             Follow up: 2 week with CARMELA            Continue cares and rehabilitation     Report completed by:  Ene Eugene PA-C  Date: 1/23/2018  Time: 9:24 AM

## 2018-01-23 NOTE — PLAN OF CARE
Problem: Bowel Obstruction (Adult)  Goal: Signs and Symptoms of Listed Potential Problems Will be Absent, Minimized or Managed (Bowel Obstruction)  Signs and symptoms of listed potential problems will be absent, minimized or managed by discharge/transition of care (reference Bowel Obstruction (Adult) CPG).  Outcome: Improving  Had one large formed BM today.  Passing gas.  Tolerated clears and advanced diet to fulls for dinner.  Explained taking liquids slow and to stop consumption if increased pain or nausea.  No further reports of chest pain.  Telemetry applied.  Up with SBA.  Ice to right hip. Alert and orientated.  New dressing applied to hip incision, intact blister on distal end.

## 2018-01-23 NOTE — PLAN OF CARE
"Problem: Patient Care Overview  Goal: Plan of Care/Patient Progress Review  Outcome: Improving  Denies pain, abd softer this AM, good amt flatus while sitting on toilet, BS present but sparse, Walked for 10 minutes during night and long walk w/ wife this Am, \"trying to bust this loose\", [place], pleasant and coop.      "

## 2018-01-23 NOTE — PLAN OF CARE
Problem: Patient Care Overview  Goal: Plan of Care/Patient Progress Review  Physical Therapy Discharge Summary    Reason for therapy discharge:    goals met    Progress towards therapy goal(s). See goals on Care Plan in [facility] electronic health record for goal details.  Goals met     - Spoke w/ pt and nursing pt up indep in his room ,ambulating   in hallway w/ o difficulty . No steps to amb at home.   Goals met    Therapy recommendation(s):    continue amb 3-4 x/ daily in preparation for Dc to home

## 2018-01-23 NOTE — PLAN OF CARE
Problem: Patient Care Overview  Goal: Plan of Care/Patient Progress Review  Outcome: Improving  Pt up in hallway few times ambulating with staff. Pt states had flatus few times this evening, continues to belch. BS hypoactive in all 4 Quad. Abdomen continues to be distended and uncomfortable. Pt agreeing to NG later in evening, attempted to place x 2, unable to pass through nasal cavity. Pt denies nausea at this time. Pt A & O, cooperative and pleasant. VSS, afebrile. Radha Nunes RN

## 2018-01-23 NOTE — PROGRESS NOTES
"[facility] Medicine Progress Note  Date of Service: 01/23/2018    Assessment & Plan   Bryson Mendez is a 77 year old male who presented on 1/19/2018 for scheduled Procedure(s):  ARTHROPLASTY REVISION HIP by David Reich MD and is being followed by the hospital medicine service for co-management of acute and/or chronic perioperative medical problems.    S/p Procedure(s):  ARTHROPLASTY REVISION HIP  - POD #4  - pain control, wound cares, physical therapy, occupational therapy and DVT prophylaxis per orthopedic surgery service     Post Operative Ileus  Developed 01/21 (afternoon of POD #2). Overnight, worsening pain and distention.  Had emesis x2.  Received Toradol and states that he felt \"much better\" after administration. XR showed: \"Multiple dilated small bowel loops with air-fluid levels. This may be due to mechanical small bowel obstruction or possibly ileus.\" Patient was made NPO.  Unable to pass NG tube x4 due to resistance.  Today, abdomen far more soft. Nausea resolved.  Feels hungry.  Passed flatus and stool this AM  - clear liquid diet; advance as tolerated  - pain control with dilaudid     CAD (coronary artery disease)  Chest Pain  Acute anterior MI 07/2017 requiring electric shock.  Underwent stent x2.  POD #4, reported to this writer that he had had 2/10 chest pain at his inferior left sided rib cage.  Pain did not move.  nothing made this better or worse.  Onset at rest.  No associated nausea, diaphoresis, SOB, numbness/paresthesias of the hands/feet.  Pre-op EKG: \"wide QRS complexes with possible frequent PACs/PVC's and bigeminy pattern. Change from comparison EKG 1/25/16 showing RBBB. Reviewed with cardiology with recommendation for possible decrease in beta-blocker dose. No acute findings.\"  States he's only had this pain with previous ACS events.  Troponin negative x1  - troponin ordered x3  - start telemetry  - EKG today shows: \"Sinus  Rhythm  - " "frequent multiform ectopic ventricular beats  # VECs = 2, # types 2. Right bundle branch block with left axis -bifascicular block\".  While unable to complete direct comparison, verbally described in a similar fashion to pre-op EKG.  - hold PTA aspirin, per orthopedic service    Diabetes mellitus type 2 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 (H)  Diet controlled as an outpatient.  BG have been elevated while inpatient.  Sliding scale was not ordered on admit.  A1c 7.2%  - recommend outpatient follow up  - start feeding SSI; medium SSI  - hyper/hypoglycemia protocol      MAIDA (obstructive sleep apnea)  Continue home CPAP      A-fib (H)  Not on anticoagulant. S/p ablation 07/2014  - continue PTA metoprolol.      Walls's esophagus without dysplasia  - continue PTA Prilosec      Benign essential hypertension  BP reviewed, controlled  - continue PTA amlodipine, losartan      Benign non-nodular prostatic hyperplasia without lower urinary tract symptoms  - continue PTA oxybutynin      Mixed hyperlipidemia  - continue PTA simvastatin        DVT Prophylaxis: as per orthopedic surgery service - Defer to primary service  Code Status: Full Code     Lines: PIV, without edema/erythema   Ordonez catheter: not indicated     Discussion: Medically, the patient appears stable.  However now with probable post operative ileus.  Will make NPO and anticipate that he will require at least 1-2 additional days of inpatient care.     Disposition: Anticipate discharge in 1-2 additional days.      Attestation:  I have reviewed today's vital signs, notes, medications, labs and imaging.     Sheyla Means PA-C    Interval History   Pain to hip is well controlled.  Tolerating both PT and OT.    Pain to abdomen is improved.  States he has had no emesis since yesterday afternoon/evening and states that he feels hungry.  He had a formed stool this AM in addition to flatus.      Reported chest pain to this writer; reports that this was around noon.  States that the pain " "was located in the inferoanterior aspect of the left side of his rib cage.  This was described as sharp, although admittedly only a 2/10.  Nothing seemed to make this pain better or worse.  Went away on it's own without intervention.  NO associated SOB, nausea, diaphoresis, numbness/paresthesia or palpitations.  However, states that the last time he had this pain was with his \"last heart attack.\"  Did not tell nurse at onset as he states he \"doesn't share much.\"     Otherwise denies fever, chills, nausea, vomiting, SOB, cough, lower extremity pain.  Lower extremity swelling remains the same.    Physical Exam   Temp:  [98.3  F (36.8  C)-98.7  F (37.1  C)] 98.3  F (36.8  C)  Pulse:  [74-75] 74  Heart Rate:  [90-93] 90  Resp:  [16-18] 16  BP: (120-172)/(54-92) 126/54  SpO2:  [94 %-96 %] 96 %    Weights:   Vitals:    01/19/18 1235 01/23/18 0256   Weight: 88.9 kg (196 lb) 93.8 kg (206 lb 12.7 oz)    Body mass index is 29.67 kg/(m^2).    General: alert and oriented x4, in no acute distress  CV: Regular rate/rhythm, no appreciable murmur, rub, or gallop  Respiratory: CTA bilaterally, equal chest expansion  GI: Far less distended than yesterday, although still slightly protuberant.  Hypoactive BS but present.  Non-tender to palpation.  Tympanic to percussion.  Skin: Warm and dry  Musculoskeletal: negative homans bilaterally.  Non-tender to palpation of posterior calves. bilateral pitting edema to lower extremities to knees; this is approximately 2+ and appears a bit improved from yesterday.  Neuro: equal  strength    Data     Recent Labs  Lab 01/23/18  0700 01/22/18  0715 01/21/18  0640  01/19/18  1819   WBC 13.1* 12.9*  --   --   --    HGB 12.0* 12.1* 11.8*  < >  --    MCV 91 92  --   --   --    * 135*  --   --  118*    138  --   --   --    POTASSIUM 4.0 4.4  --   --   --    CHLORIDE 107 104  --   --   --    CO2 26 28  --   --   --    BUN 12 14  --   --   --    CR 0.68 0.71  --   --  0.68   ANIONGAP 4 6  " --   --   --    CHERI 8.0* 8.1*  --   --   --    * 158*  --   --   --    < > = values in this interval not displayed.      Recent Labs  Lab 01/23/18  1126 01/23/18  0727 01/23/18  0700 01/23/18  0232 01/22/18  2121 01/22/18  1633 01/22/18  1441  01/22/18  0715   GLC  --   --  121*  --   --   --   --   --  158*   * 123*  --  114* 149* 136* 149*  < >  --    < > = values in this interval not displayed.     Unresulted Labs Ordered in the Past 30 Days of this Admission     Date and Time Order Name Status Description    1/23/2018 1236 Troponin I In process            Imaging  No results found for this or any previous visit (from the past 24 hour(s)).     I reviewed all new labs and imaging results over the last 24 hours. I personally reviewed the EKG tracing showing frequent PVC.  No overt ST segment changes.  .    Medications     NaCl 75 mL/hr at 01/23/18 0834       insulin aspart  1-6 Units Subcutaneous Q4H     amLODIPine  5 mg Oral Daily     metoprolol tartrate  25 mg Oral BID     sodium chloride (PF)  3 mL Intracatheter Q8H     enoxaparin  40 mg Subcutaneous Q24H     senna-docusate  1-2 tablet Oral BID     omeprazole  40 mg Oral QPM     oxybutynin  10 mg Oral Daily     simvastatin  20 mg Oral At Bedtime     losartan  50 mg Oral Daily       Sheyla Means PA-C

## 2018-01-24 PROBLEM — Z96.649 S/P HIP REPLACEMENT: Status: ACTIVE | Noted: 2018-01-24

## 2018-01-24 LAB
BASOPHILS # BLD AUTO: 0 10E9/L (ref 0–0.2)
BASOPHILS NFR BLD AUTO: 0.3 %
DIFFERENTIAL METHOD BLD: ABNORMAL
EOSINOPHIL # BLD AUTO: 0.3 10E9/L (ref 0–0.7)
EOSINOPHIL NFR BLD AUTO: 2.8 %
ERYTHROCYTE [DISTWIDTH] IN BLOOD BY AUTOMATED COUNT: 14.3 % (ref 10–15)
GLUCOSE BLDC GLUCOMTR-MCNC: 118 MG/DL (ref 70–99)
GLUCOSE BLDC GLUCOMTR-MCNC: 131 MG/DL (ref 70–99)
GLUCOSE BLDC GLUCOMTR-MCNC: 144 MG/DL (ref 70–99)
GLUCOSE BLDC GLUCOMTR-MCNC: 150 MG/DL (ref 70–99)
GLUCOSE BLDC GLUCOMTR-MCNC: 170 MG/DL (ref 70–99)
HCT VFR BLD AUTO: 36.1 % (ref 40–53)
HGB BLD-MCNC: 12 G/DL (ref 13.3–17.7)
IMM GRANULOCYTES # BLD: 0 10E9/L (ref 0–0.4)
IMM GRANULOCYTES NFR BLD: 0.4 %
LYMPHOCYTES # BLD AUTO: 3.4 10E9/L (ref 0.8–5.3)
LYMPHOCYTES NFR BLD AUTO: 30 %
MCH RBC QN AUTO: 30 PG (ref 26.5–33)
MCHC RBC AUTO-ENTMCNC: 33.2 G/DL (ref 31.5–36.5)
MCV RBC AUTO: 90 FL (ref 78–100)
MONOCYTES # BLD AUTO: 1.6 10E9/L (ref 0–1.3)
MONOCYTES NFR BLD AUTO: 13.8 %
NEUTROPHILS # BLD AUTO: 6 10E9/L (ref 1.6–8.3)
NEUTROPHILS NFR BLD AUTO: 52.7 %
PLATELET # BLD AUTO: 168 10E9/L (ref 150–450)
RBC # BLD AUTO: 4 10E12/L (ref 4.4–5.9)
TROPONIN I SERPL-MCNC: 0.02 UG/L (ref 0–0.04)
TROPONIN I SERPL-MCNC: <0.015 UG/L (ref 0–0.04)
WBC # BLD AUTO: 11.3 10E9/L (ref 4–11)

## 2018-01-24 PROCEDURE — A9270 NON-COVERED ITEM OR SERVICE: HCPCS | Mod: GY | Performed by: PHYSICIAN ASSISTANT

## 2018-01-24 PROCEDURE — 25000128 H RX IP 250 OP 636: Performed by: ORTHOPAEDIC SURGERY

## 2018-01-24 PROCEDURE — 93005 ELECTROCARDIOGRAM TRACING: CPT

## 2018-01-24 PROCEDURE — 99233 SBSQ HOSP IP/OBS HIGH 50: CPT | Performed by: PHYSICIAN ASSISTANT

## 2018-01-24 PROCEDURE — 84484 ASSAY OF TROPONIN QUANT: CPT | Performed by: PHYSICIAN ASSISTANT

## 2018-01-24 PROCEDURE — 12000007 ZZH R&B INTERMEDIATE

## 2018-01-24 PROCEDURE — 00000146 ZZHCL STATISTIC GLUCOSE BY METER IP

## 2018-01-24 PROCEDURE — 25000132 ZZH RX MED GY IP 250 OP 250 PS 637: Mod: GY | Performed by: FAMILY MEDICINE

## 2018-01-24 PROCEDURE — 25000132 ZZH RX MED GY IP 250 OP 250 PS 637: Mod: GY | Performed by: ORTHOPAEDIC SURGERY

## 2018-01-24 PROCEDURE — 25000132 ZZH RX MED GY IP 250 OP 250 PS 637: Mod: GY | Performed by: PHYSICIAN ASSISTANT

## 2018-01-24 PROCEDURE — 40000275 ZZH STATISTIC RCP TIME EA 10 MIN

## 2018-01-24 PROCEDURE — A9270 NON-COVERED ITEM OR SERVICE: HCPCS | Mod: GY | Performed by: FAMILY MEDICINE

## 2018-01-24 PROCEDURE — A9270 NON-COVERED ITEM OR SERVICE: HCPCS | Mod: GY | Performed by: ORTHOPAEDIC SURGERY

## 2018-01-24 PROCEDURE — 85025 COMPLETE CBC W/AUTO DIFF WBC: CPT | Performed by: PHYSICIAN ASSISTANT

## 2018-01-24 PROCEDURE — 36415 COLL VENOUS BLD VENIPUNCTURE: CPT | Performed by: PHYSICIAN ASSISTANT

## 2018-01-24 RX ORDER — NITROGLYCERIN 0.4 MG/1
0.4 TABLET SUBLINGUAL EVERY 5 MIN PRN
Status: DISCONTINUED | OUTPATIENT
Start: 2018-01-24 | End: 2018-01-25 | Stop reason: HOSPADM

## 2018-01-24 RX ORDER — ALUMINA, MAGNESIA, AND SIMETHICONE 2400; 2400; 240 MG/30ML; MG/30ML; MG/30ML
30 SUSPENSION ORAL EVERY 4 HOURS PRN
Status: DISCONTINUED | OUTPATIENT
Start: 2018-01-24 | End: 2018-01-25 | Stop reason: HOSPADM

## 2018-01-24 RX ORDER — METOPROLOL TARTRATE 25 MG/1
25 TABLET, FILM COATED ORAL 2 TIMES DAILY
Status: COMPLETED | OUTPATIENT
Start: 2018-01-24 | End: 2018-01-24

## 2018-01-24 RX ORDER — NITROGLYCERIN 0.4 MG/1
0.4 TABLET SUBLINGUAL EVERY 5 MIN PRN
Status: DISCONTINUED | OUTPATIENT
Start: 2018-01-24 | End: 2018-01-24

## 2018-01-24 RX ADMIN — OXYBUTYNIN CHLORIDE 10 MG: 5 TABLET, EXTENDED RELEASE ORAL at 08:00

## 2018-01-24 RX ADMIN — NITROGLYCERIN 0.4 MG: 0.4 TABLET SUBLINGUAL at 11:44

## 2018-01-24 RX ADMIN — ACETAMINOPHEN 650 MG: 325 TABLET, FILM COATED ORAL at 08:00

## 2018-01-24 RX ADMIN — ENOXAPARIN SODIUM 40 MG: 40 INJECTION SUBCUTANEOUS at 14:52

## 2018-01-24 RX ADMIN — SENNOSIDES AND DOCUSATE SODIUM 1 TABLET: 8.6; 5 TABLET ORAL at 08:01

## 2018-01-24 RX ADMIN — METOPROLOL TARTRATE 25 MG: 25 TABLET ORAL at 08:00

## 2018-01-24 RX ADMIN — METOPROLOL TARTRATE 25 MG: 25 TABLET ORAL at 17:15

## 2018-01-24 RX ADMIN — HYDROMORPHONE HYDROCHLORIDE 2 MG: 2 TABLET ORAL at 00:41

## 2018-01-24 RX ADMIN — LOSARTAN POTASSIUM 50 MG: 50 TABLET, FILM COATED ORAL at 08:01

## 2018-01-24 RX ADMIN — OMEPRAZOLE 40 MG: 20 CAPSULE, DELAYED RELEASE ORAL at 21:27

## 2018-01-24 RX ADMIN — SIMVASTATIN 20 MG: 20 TABLET, FILM COATED ORAL at 21:28

## 2018-01-24 RX ADMIN — AMLODIPINE BESYLATE 5 MG: 5 TABLET ORAL at 08:00

## 2018-01-24 NOTE — PLAN OF CARE
Problem: Patient Care Overview  Goal: Plan of Care/Patient Progress Review  Outcome: Improving  Patient reported chest pain. He reported that it is worse pain this time. Woke him up. /58. He was given one nitro 0.4 mg SL. He reported that chest pain resolved after nitro was totally dissolved, about 5 minutes. /53. Updated Sheyla Means.

## 2018-01-24 NOTE — PLAN OF CARE
"Problem: Hip Arthroplasty (Total, Partial) (Adult)  Goal: Signs and Symptoms of Listed Potential Problems Will be Absent, Minimized or Managed (Hip Arthroplasty)  Signs and symptoms of listed potential problems will be absent, minimized or managed by discharge/transition of care (reference Hip Arthroplasty (Total, Partial) (Adult) CPG).   Outcome: Improving  Patient is alert, oriented, SBA with walker. Prn dilaudid given x 1. LS clear. Pt has BLLE edema +2. Compression stockings on. [place]. Pt denies nausea, SOB. /57 (BP Location: Left arm)  Pulse 63  Temp 97.5  F (36.4  C) (Oral)  Resp 18  Ht 1.778 m (5' 10\")  Wt 93.8 kg (206 lb 12.7 oz)  SpO2 94%  BMI 29.67 kg/m2        "

## 2018-01-24 NOTE — DISCHARGE SUMMARY
[facility] Orthopedics Discharge Summary                                  [facility]     CRIS ANDERSON [id number]   Age: 77 year old  PCP: Thien Davison, [phone] 1940     Date of Admission:  1/19/2018  Date of Discharge::  No discharge date for patient encounter.  Discharge Physician:  Bekah Bhatia    Code status:  Full Code    Admission Information:  Admission Diagnosis:  failed total hip arthroplasty  Failed total hip arthroplasty (H)  Chest pain    Post-Operative Day: 5 Days Post-Op     Reason for admission:  The patient was admitted for the following:Procedure(s) (LRB):  ARTHROPLASTY REVISION HIP (Right)    Principal Problem:    Failed total hip arthroplasty (H)  Active Problems:    CAD (coronary artery disease)    MAIDA (obstructive sleep apnea)    A-fib (H)    Walls's esophagus without dysplasia    Benign essential hypertension    Benign non-nodular prostatic hyperplasia without lower urinary tract symptoms    Diabetes mellitus type 2 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 (H)    Mixed hyperlipidemia      Allergies:  Lisinopril and Nka [no known allergies]    Following the procedure noted above the patient was transferred to the post-op floor and started on:    Therapy:  physical therapy and occupational therapy  Anticoagulation Plan: Lovenox inpatient and then  mg daily at discharge  for 42 days  Pain Management: dilaudid and tylenol  Weight bearing status: Weight bearing as tolerated     The patient was followed and co-managed by the hospitalist service during the inpatient treatment course  Complications:  Post operative ileus, prolonging hospital stay an additional 2 days. Resolving at discharge. NG tube attempted but unsuccessful. Ileus resolved spontaneously  Consultations:  None     Pertinent Labs   Lab Results: personally reviewed.     Recent Labs   Lab Test  01/24/18   0705  01/23/18   0700  01/22/18   0715   07/16/14   0520  07/15/14   1005   07/13/14   1940  06/20/13    0630   INR   --    --    --    --    --   1.14   --   1.16*  1.41*   HGB  12.0*  12.0*  12.1*   < >  13.3   --    < >  15.3   --    HCT  36.1*  36.4*  36.8*   --    --    --    < >  44.1   --    MCV  90  91  92   --    --    --    < >  93   --    PLT  168  148*  135*   < >   --    --    < >  213   --    NA   --   137  138   --   134   --    < >  137   --     < > = values in this interval not displayed.          Discharge Information:  Condition at discharge: Stable  Discharge destination:  Discharged to home     Medications at discharge:  Current Discharge Medication List      START taking these medications    Details   acetaminophen (TYLENOL) 325 MG tablet Take 2 tablets (650 mg) by mouth every 4 hours as needed for other (surgical pain)  Qty: 100 tablet, Refills: 0    Associated Diagnoses: S/P revision of total hip      senna-docusate (SENOKOT-S;PERICOLACE) 8.6-50 MG per tablet Take 1 tablet by mouth 2 times daily as needed for constipation  Qty: 100 tablet, Refills: 0    Associated Diagnoses: S/P revision of total hip      !! order for DME Equipment being ordered: Walker Wheels () and Walker ()  Treatment Diagnosis: s/p R ROMAN  Qty: 1 Units, Refills: 0    Associated Diagnoses: S/P revision of total hip       !! - Potential duplicate medications found. Please discuss with provider.      CONTINUE these medications which have CHANGED    Details   aspirin  MG EC tablet Take 1 tablet (325 mg) by mouth daily  Qty: 42 tablet, Refills: 0    Associated Diagnoses: S/P revision of total hip         CONTINUE these medications which have NOT CHANGED    Details   !! order for DME At Bedtime CPAP      meloxicam (MOBIC) 15 MG tablet Take 15 mg by mouth daily       oxybutynin (DITROPAN-XL) 5 MG 24 hr tablet Take 10 mg by mouth daily      omeprazole (PRILOSEC) 40 MG capsule Take 40 mg by mouth daily       Omega-3 Fatty Acids (OMEGA 3 PO) Take 1 capsule by mouth 2 times daily       OVER-THE-COUNTER 2 tablets daily  Tebs for eyes.      Multiple Vitamins-Minerals (PRESERVISION/LUTEIN PO) Take 1 capsule by mouth 2 times daily      nitroglycerin (NITROSTAT) 0.4 MG SL tablet Place 1 tablet (0.4 mg) under the tongue every 5 minutes as needed for chest pain  Qty: 25 tablet      metoprolol (LOPRESSOR) 100 MG tablet Take 1 tablet (100 mg) by mouth 2 times daily  Qty: 60 tablet      Cholecalciferol (VITAMIN D3 PO) Take 5,000 Units by mouth every 3 days       Multiple Vitamins-Minerals (MULTIVITAL PO) Take 1 tablet by mouth 2 times daily       amlodipine (NORVASC) 5 MG tablet Take 5 mg by mouth daily.      simvastatin (ZOCOR) 20 MG tablet Take 20 mg by mouth At Bedtime.      losartan (COZAAR) 50 MG tablet Take 50 mg by mouth daily.      VIAGRA 100 MG tablet Take 100 mg by mouth daily as needed   Refills: 2       !! - Potential duplicate medications found. Please discuss with provider.                     Follow-Up Care:  Patient should be seen in the office in 10-14 days by the Orthopedic Surgeon/Physician Assistant.  Call [phone] for appointment or questions.    Bekah Bhatia

## 2018-01-24 NOTE — PROGRESS NOTES
"When I first met pt today, I told pt he must tell me  If he had chest pain so we could find out what is going on.  About 2300, pt told KARLENE he needed the EKG for chest pain.  I went to check on him while waiting for EKG and asked him about his pain.  He stated, it was to left of midline,\" you know, just a little ache, kind of like I have all the time\".  Pt was comfortable, resp arrived to do EKG.  OCTAVIO Ferreira RN  "

## 2018-01-24 NOTE — PLAN OF CARE
Problem: Patient Care Overview  Goal: Plan of Care/Patient Progress Review  Outcome: Improving  Patient reported chest pain 2-3/10 this am. He reported improvement after vital signs were obtained and fully resolved within 12 minutes. Updated Sheyla Means and received order for ekg and nitro as needed.

## 2018-01-24 NOTE — PROGRESS NOTES
"[facility] Orthopaedics Progress Note      Post-operative Day: 5 Days Post-Op    Procedure(s):  Right Hip Acetabulum Revision - Wound Class: I-Clean      Subjective:    Pain: minimal - rates pain about a 4. States he took a Dilaudid this morning with good relief, otherwise is denying concerns  Chest pain, SOB:  No  Feels abdomen is getting more distended, but states he is continuing to pass some gas. Denies nausea or vomiting. Had large BM 1 day ago    Objective:  Blood pressure 135/57, pulse 63, temperature 97.5  F (36.4  C), temperature source Oral, resp. rate 18, height 1.778 m (5' 10\"), weight 93.8 kg (206 lb 12.7 oz), SpO2 94 %.    Patient Vitals for the past 24 hrs:   BP Temp Temp src Pulse Resp SpO2   01/23/18 2234 135/57 97.5  F (36.4  C) Oral 63 18 94 %   01/23/18 1600 - - - 92 - -   01/23/18 1500 136/53 98  F (36.7  C) Oral 54 16 96 %       Wt Readings from Last 4 Encounters:   01/23/18 93.8 kg (206 lb 12.7 oz)   07/07/15 93 kg (205 lb)   06/25/15 93 kg (205 lb)   05/17/15 90.7 kg (200 lb)         Motor function, sensation, and circulation intact   Yes  Wound status: incisions are clean dry and intact. Yes  Calf tenderness: Bilateral  No    Pertinent Labs   Lab Results: personally reviewed.     Recent Labs   Lab Test  01/24/18   0705  01/23/18   0700  01/22/18   0715   07/16/14   0520  07/15/14   1005   07/13/14   1940  06/20/13   0630   INR   --    --    --    --    --   1.14   --   1.16*  1.41*   HGB  12.0*  12.0*  12.1*   < >  13.3   --    < >  15.3   --    HCT  36.1*  36.4*  36.8*   --    --    --    < >  44.1   --    MCV  90  91  92   --    --    --    < >  93   --    PLT  168  148*  135*   < >   --    --    < >  213   --    NA   --   137  138   --   134   --    < >  137   --     < > = values in this interval not displayed.       Plan: Anticoagulation protocol: Lovenox inpatient and then  mg daily at discharge  x 42  days            Pain medications:  dilaudid and tylenol            Weight " bearing status:  WBAT            Disposition:  Home today if cleared by medicine. No outpatient therapy necessary, pt will continue with HEP and ambulation             Continue cares and rehabilitation     Report completed by:  Bekah Bhatia PA-C  Date: 1/24/2018  Time: 7:48 AM

## 2018-01-24 NOTE — PLAN OF CARE
Problem: Bowel Obstruction (Adult)  Goal: Signs and Symptoms of Listed Potential Problems Will be Absent, Minimized or Managed (Bowel Obstruction)  Signs and symptoms of listed potential problems will be absent, minimized or managed by discharge/transition of care (reference Bowel Obstruction (Adult) CPG).   Outcome: Improving  Today, pt has had a large and X-L BM.  Just now, another Lg BM.Pt up walking frequently in [place].  Pt states he is comfortable.  Wife brought in his compression stockings from home to help decrease edema.  Pt using IS.  CMS R leg good.  KEDAR Ferreira RN

## 2018-01-25 ENCOUNTER — APPOINTMENT (OUTPATIENT)
Dept: NUCLEAR MEDICINE | Facility: CLINIC | Age: 78
DRG: 467 | End: 2018-01-25
Attending: PHYSICIAN ASSISTANT
Payer: MEDICARE

## 2018-01-25 VITALS
HEIGHT: 70 IN | RESPIRATION RATE: 16 BRPM | SYSTOLIC BLOOD PRESSURE: 147 MMHG | DIASTOLIC BLOOD PRESSURE: 79 MMHG | WEIGHT: 206.79 LBS | HEART RATE: 79 BPM | OXYGEN SATURATION: 98 % | TEMPERATURE: 97.9 F | BODY MASS INDEX: 29.6 KG/M2

## 2018-01-25 PROBLEM — S72.001A CLOSED RIGHT HIP FRACTURE (H): Status: ACTIVE | Noted: 2018-01-25

## 2018-01-25 LAB
GLUCOSE BLDC GLUCOMTR-MCNC: 127 MG/DL (ref 70–99)
GLUCOSE BLDC GLUCOMTR-MCNC: 128 MG/DL (ref 70–99)
GLUCOSE BLDC GLUCOMTR-MCNC: 178 MG/DL (ref 70–99)
PLATELET # BLD AUTO: 186 10E9/L (ref 150–450)

## 2018-01-25 PROCEDURE — 25000128 H RX IP 250 OP 636: Performed by: ORTHOPAEDIC SURGERY

## 2018-01-25 PROCEDURE — 25000128 H RX IP 250 OP 636: Performed by: PHYSICIAN ASSISTANT

## 2018-01-25 PROCEDURE — 25000132 ZZH RX MED GY IP 250 OP 250 PS 637: Mod: GY | Performed by: FAMILY MEDICINE

## 2018-01-25 PROCEDURE — A9270 NON-COVERED ITEM OR SERVICE: HCPCS | Mod: GY | Performed by: FAMILY MEDICINE

## 2018-01-25 PROCEDURE — 00000146 ZZHCL STATISTIC GLUCOSE BY METER IP

## 2018-01-25 PROCEDURE — A9270 NON-COVERED ITEM OR SERVICE: HCPCS | Mod: GY | Performed by: PHYSICIAN ASSISTANT

## 2018-01-25 PROCEDURE — 78452 HT MUSCLE IMAGE SPECT MULT: CPT

## 2018-01-25 PROCEDURE — 93016 CV STRESS TEST SUPVJ ONLY: CPT | Performed by: INTERNAL MEDICINE

## 2018-01-25 PROCEDURE — 34300033 ZZH RX 343: Performed by: INTERNAL MEDICINE

## 2018-01-25 PROCEDURE — 25000132 ZZH RX MED GY IP 250 OP 250 PS 637: Mod: GY | Performed by: ORTHOPAEDIC SURGERY

## 2018-01-25 PROCEDURE — 36415 COLL VENOUS BLD VENIPUNCTURE: CPT | Performed by: ORTHOPAEDIC SURGERY

## 2018-01-25 PROCEDURE — 85049 AUTOMATED PLATELET COUNT: CPT | Performed by: ORTHOPAEDIC SURGERY

## 2018-01-25 PROCEDURE — 93018 CV STRESS TEST I&R ONLY: CPT | Performed by: INTERNAL MEDICINE

## 2018-01-25 PROCEDURE — A9502 TC99M TETROFOSMIN: HCPCS | Performed by: INTERNAL MEDICINE

## 2018-01-25 PROCEDURE — 93017 CV STRESS TEST TRACING ONLY: CPT

## 2018-01-25 PROCEDURE — 25000132 ZZH RX MED GY IP 250 OP 250 PS 637: Mod: GY | Performed by: PHYSICIAN ASSISTANT

## 2018-01-25 PROCEDURE — 99207 ZZC CDG-CODE CATEGORY CHANGED: CPT | Performed by: PHYSICIAN ASSISTANT

## 2018-01-25 PROCEDURE — 78452 HT MUSCLE IMAGE SPECT MULT: CPT | Mod: 26 | Performed by: INTERNAL MEDICINE

## 2018-01-25 PROCEDURE — 99239 HOSP IP/OBS DSCHRG MGMT >30: CPT | Performed by: PHYSICIAN ASSISTANT

## 2018-01-25 PROCEDURE — A9270 NON-COVERED ITEM OR SERVICE: HCPCS | Mod: GY | Performed by: ORTHOPAEDIC SURGERY

## 2018-01-25 RX ORDER — REGADENOSON 0.08 MG/ML
0.4 INJECTION, SOLUTION INTRAVENOUS ONCE
Status: COMPLETED | OUTPATIENT
Start: 2018-01-25 | End: 2018-01-25

## 2018-01-25 RX ORDER — ISOSORBIDE MONONITRATE 30 MG/1
15 TABLET, EXTENDED RELEASE ORAL DAILY
Qty: 30 TABLET | Refills: 0 | Status: SHIPPED | OUTPATIENT
Start: 2018-01-25 | End: 2020-09-05

## 2018-01-25 RX ORDER — HYDROMORPHONE HYDROCHLORIDE 2 MG/1
2-4 TABLET ORAL EVERY 4 HOURS PRN
Qty: 60 TABLET | Refills: 0 | Status: ON HOLD | OUTPATIENT
Start: 2018-01-25 | End: 2018-04-25

## 2018-01-25 RX ADMIN — ENOXAPARIN SODIUM 40 MG: 40 INJECTION SUBCUTANEOUS at 15:13

## 2018-01-25 RX ADMIN — LOSARTAN POTASSIUM 50 MG: 50 TABLET, FILM COATED ORAL at 11:19

## 2018-01-25 RX ADMIN — HYDROMORPHONE HYDROCHLORIDE 2 MG: 2 TABLET ORAL at 03:22

## 2018-01-25 RX ADMIN — OXYBUTYNIN CHLORIDE 10 MG: 5 TABLET, EXTENDED RELEASE ORAL at 11:19

## 2018-01-25 RX ADMIN — ACETAMINOPHEN 650 MG: 325 TABLET, FILM COATED ORAL at 15:14

## 2018-01-25 RX ADMIN — TETROFOSMIN 24.5 MCI.: 1.38 INJECTION, POWDER, LYOPHILIZED, FOR SOLUTION INTRAVENOUS at 13:40

## 2018-01-25 RX ADMIN — AMLODIPINE BESYLATE 5 MG: 5 TABLET ORAL at 11:20

## 2018-01-25 RX ADMIN — TETROFOSMIN 8.1 MCI.: 1.38 INJECTION, POWDER, LYOPHILIZED, FOR SOLUTION INTRAVENOUS at 11:00

## 2018-01-25 RX ADMIN — REGADENOSON 0.4 MG: 0.08 INJECTION, SOLUTION INTRAVENOUS at 13:15

## 2018-01-25 RX ADMIN — HYDROMORPHONE HYDROCHLORIDE 4 MG: 2 TABLET ORAL at 11:19

## 2018-01-25 RX ADMIN — SENNOSIDES AND DOCUSATE SODIUM 1 TABLET: 8.6; 5 TABLET ORAL at 11:20

## 2018-01-25 RX ADMIN — HYDROMORPHONE HYDROCHLORIDE 4 MG: 2 TABLET ORAL at 15:13

## 2018-01-25 RX ADMIN — HYDROMORPHONE HYDROCHLORIDE 4 MG: 2 TABLET ORAL at 07:05

## 2018-01-25 ASSESSMENT — VISUAL ACUITY: OU: NORMAL ACUITY

## 2018-01-25 NOTE — PROGRESS NOTES
This writer tried to get a hold of cardiology in regards to pt's morning meds, unable to get through. All morning meds held for now until cardiology oks them. Pt aware and agrees.

## 2018-01-25 NOTE — PLAN OF CARE
Problem: Bowel Obstruction (Adult)  Goal: Signs and Symptoms of Listed Potential Problems Will be Absent, Minimized or Managed (Bowel Obstruction)  Signs and symptoms of listed potential problems will be absent, minimized or managed by discharge/transition of care (reference Bowel Obstruction (Adult) CPG).   Outcome: Improving  Pt still  Having occasional sm stools.  Passing a lot of gas.  Pt still refusing pain  Meds.  DDI.  CMS good.  Pt moves easily.  Pt has not had caffeine,  And metoprolol given early as ordered.  Pt doing well.  No chest pain this evening.  JOCE Ferreira RN

## 2018-01-25 NOTE — DISCHARGE SUMMARY
[facility]    Discharge Summary  Hospital Medicine    Date of Admission:  1/19/2018  Date of Discharge:  1/25/2018   Discharging Provider: Sheyla Means  Date of Service: 1/25/2018       Discharge Instructions:  1. Please follow up with your cardiologist as soon as possible to discuss your stress test and possible angiogram.  Use your sublingual nitroglycerin with any chest pain and call 911    2. Please start taking 15mg of Imdur once daily. This medication is waiting for you at the [facility]. This is a medication that you should NOT take with your Viagra.    3. Please see Thien Davison within 7-10 days of discharge; we would like for to talk to him about your diabetes.  Your A1c while you were in the hospital was 7.2%.  This is an indicator that your Type II Diabetes has likely progressed to the point in which you will need to take diabetes medications.        Primary Care     Thien Davison  [place] [id number] [facility] [id number]      Identification and Chief Complaint: Bryson Mendez is a 77 year old male who presented on 1/19/2018 for elective hip replacement.    Discharge Diagnoses       Failed total hip arthroplasty (H)    CAD (coronary artery disease)    MAIDA (obstructive sleep apnea)    A-fib (H)    Walls's esophagus without dysplasia    Benign essential hypertension    Benign non-nodular prostatic hyperplasia without lower urinary tract symptoms    Diabetes mellitus type 2 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 (H)    Mixed hyperlipidemia    S/P hip replacement      Discharge Disposition   Discharged to home    Discharge Orders     Physical Therapy Referral     Reason for your hospital stay   Right acetabulum revision     Follow-up and recommended labs and tests   Follow up with  Bettina Diaz PA-C , at  [facility], within 2 weeks to evaluate after surgery and for hospital follow- up.     Activity   Your activity upon discharge: Activity as tolerated, no  driving until off narcotic pain medication.     When to contact your care team   Call your Orthopedic surgeon at [facility]  if you have any of the following: temperature greater than 100.4,  increased shortness of breath, increased drainage, increased swelling or increased pain.     Wound care and dressings   Instructions to care for your wound at home: as directed, daily dressing changes, ice to area for comfort, keep wound clean and dry and may get incision wet in shower but do not soak or scrub.     Reason for your hospital stay   Right hip replacement.    Post operative ileus.    Chest pain necessitating stress testing.     Follow-up and recommended labs and tests    Follow up with primary care provider, Thien Davison, within 7 days for hospital follow- up.  No follow up labs or test are needed.     Activity   Your activity upon discharge: activity as tolerated     Full Code     Diet   Follow this diet upon discharge: Orders Placed This Encounter     NPO for Medical/Clinical Reasons Except for: Meds       Diet   Follow this diet upon discharge: Regular diet       Discharge Medications   Current Discharge Medication List      START taking these medications    Details   isosorbide mononitrate (IMDUR) 30 MG 24 hr tablet Take 0.5 tablets (15 mg) by mouth daily  Qty: 30 tablet, Refills: 0    Associated Diagnoses: Coronary artery disease involving native heart with angina pectoris, unspecified vessel or lesion type (H)      acetaminophen (TYLENOL) 325 MG tablet Take 2 tablets (650 mg) by mouth every 4 hours as needed for other (surgical pain)  Qty: 100 tablet, Refills: 0    Associated Diagnoses: S/P revision of total hip      senna-docusate (SENOKOT-S;PERICOLACE) 8.6-50 MG per tablet Take 1 tablet by mouth 2 times daily as needed for constipation  Qty: 100 tablet, Refills: 0    Associated Diagnoses: S/P revision of total hip      !! order for DME Equipment being ordered: Walker Wheels () and Walker  ()  Treatment Diagnosis: s/p R ROMAN  Qty: 1 Units, Refills: 0    Associated Diagnoses: S/P revision of total hip       !! - Potential duplicate medications found. Please discuss with provider.      CONTINUE these medications which have CHANGED    Details   aspirin  MG EC tablet Take 1 tablet (325 mg) by mouth daily  Qty: 42 tablet, Refills: 0    Associated Diagnoses: S/P revision of total hip         CONTINUE these medications which have NOT CHANGED    Details   !! order for DME At Bedtime CPAP      meloxicam (MOBIC) 15 MG tablet Take 15 mg by mouth daily       oxybutynin (DITROPAN-XL) 5 MG 24 hr tablet Take 10 mg by mouth daily      omeprazole (PRILOSEC) 40 MG capsule Take 40 mg by mouth daily       Omega-3 Fatty Acids (OMEGA 3 PO) Take 1 capsule by mouth 2 times daily       OVER-THE-COUNTER 2 tablets daily Tebs for eyes.      Multiple Vitamins-Minerals (PRESERVISION/LUTEIN PO) Take 1 capsule by mouth 2 times daily      nitroglycerin (NITROSTAT) 0.4 MG SL tablet Place 1 tablet (0.4 mg) under the tongue every 5 minutes as needed for chest pain  Qty: 25 tablet      metoprolol (LOPRESSOR) 100 MG tablet Take 1 tablet (100 mg) by mouth 2 times daily  Qty: 60 tablet      Cholecalciferol (VITAMIN D3 PO) Take 5,000 Units by mouth every 3 days       Multiple Vitamins-Minerals (MULTIVITAL PO) Take 1 tablet by mouth 2 times daily       amlodipine (NORVASC) 5 MG tablet Take 5 mg by mouth daily.      simvastatin (ZOCOR) 20 MG tablet Take 20 mg by mouth At Bedtime.      losartan (COZAAR) 50 MG tablet Take 50 mg by mouth daily.      VIAGRA 100 MG tablet Take 100 mg by mouth daily as needed   Refills: 2       !! - Potential duplicate medications found. Please discuss with provider.        Allergies   Allergies   Allergen Reactions     Lisinopril      Other reaction(s): Breathing Difficulty, Fever, Redness     Nka [No Known Allergies]        Consultations This Hospital Stay    SPIRITUAL HEALTH SERVICES IP  CONSULT  OCCUPATIONAL THERAPY ADULT IP CONSULT  PHYSICAL THERAPY ADULT IP CONSULT  SPIRITUAL HEALTH SERVICES IP CONSULT    Significant Results and Procedures   Procedures    None    Data   Results for orders placed or performed during the hospital encounter of 01/19/18   XR Pelvis Port 1/2 Views    Narrative    PELVIS PORTABLE ONE TO TWO VIEWS    1/19/2018 4:51 PM     HISTORY: Postoperative hip arthroplasty.    COMPARISON: None.    FINDINGS: Bilateral total hip arthroplasties. Components appear well  seated.      Impression    IMPRESSION: Nothing acute. Bilateral hip arthroplasties.    BHARTI LAWRENCE MD   XR Abdomen 2 Views    Narrative    XR ABDOMEN 2 VW  1/22/2018 4:46 AM     INDICATION: Increased abdominal pain, distended abdomen.    COMPARISON: CT 7/14/2014.      Impression    IMPRESSION: Multiple dilated small bowel loops with air-fluid levels.  This may be due to mechanical small bowel obstruction or possibly  ileus.    HE ARGUETA MD       LEXISCAN:  1.  Myocardial perfusion imaging using single isotope technique  demonstrated nontransmural infarction of the anterior, anteroseptal,  and apical walls with mild residual ischemia involving the mid and  distal anterior wall.   2. Gated images demonstrated mild anterior and anteroseptal  hypokinesis with mild left ventricular chamber enlargement. End  diastolic volume was 145 cc.  The left ventricular systolic function  is mildly reduced, with an ejection fraction measured at 45%.  3. I do not have reports of previous studies but there is an note  accompanying the stress test information indicating that the patient  had a previous study performed at [facility] in 2016 which showed  some reversibility within the lateral apex. I am not sure how that  compares with current study. Results discussed with Dr. Condon.    History of Present Illness   (From H&P) HPI per PCP pre-operative H and P.    Hospital Course   Bryson Mendez was admitted on 1/19/2018.   "The following problems were addressed during his hospitalization:    ARTHROPLASTY REVISION HIP  Discharged POD #6 (ortho signed off 01/24/2018).  Pain control and DVT prophylaxis were per the orthopedic team.  Post operative course was complicated by post operative ileus and chest pain as below.        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  Underwent stent x2.      01/23 (POD #4), reported to this writer that he had had 2/10 chest pain at his inferior left sided rib cage.  Pain did not move.  Nothing made this better or worse.  Onset at rest.  No associated nausea, diaphoresis, SOB, numbness/paresthesias of the hands/feet.  Pre-op EKG within the [facility] system showed: \"wide QRS complexes with possible frequent PACs/PVC's and bigeminy pattern. Change from comparison EKG 1/25/16 showing RBBB. Reviewed with cardiology with recommendation for possible decrease in beta-blocker dose. No acute findings.\" Upon further questioning, the patient stated he's only had this pain with previous ACS events.  The patient had 3 episodes of chest pain following the above (4 total).  EKG's were done with the first three episodes of pain.      EKG #1, while unable to compare directly with preop EKG, was described similarly. On EKG #2, T-waves in aVL, III, and aVF had inverted or flipped direction.  However, EKG #3 appeared identical to that of EKG #1.  This was discussed with respiratory therapy whom, while they did not preform the 2nd EKG, conveyed that such findings may simply be a result of inappropriate lead placement.  Troponin was negative x4.  During the patient's 4th episode of chest pain, shortness of breath was appreciated; both SOB and pain were alleviated with the use of sublingual nitroglycerin (this was the only episode in which we were able to get this medicine to the patient as he would frequently tell nursing staff about his " "chest pain after it had entirely resolved).      Given the patient's above history, subjective sensation that this chest pain was \"the same\" as his previous MI, and response to nitroglycerin, it was decided that the patient should undergo stress testing prior to discharge.  The patient's last stress test previous to this admission was 04/2016; this showed \"a small, mild, incompletely reversible defect at the lateral apex, consistent with a  small infarct and a small amount of ischemia in the same territory. LVEF is calculated to be 54%. Unchanged from study in 08/2014\".  Today's stress test notes \"nontransmural infarction of the anterior, anteroseptal, and apical walls with mild residual ischemia involving the mid and distal anterior wall. mild anterior and anteroseptal hypokinesis with mild left ventricular chamber enlargement. The left ventricular systolic function is mildly reduced, with an ejection fraction measured at 45%. There is an note accompanying the stress test information indicating that the patient had a previous study performed at [facility] in 2016 which showed some reversibility within the lateral apex. I am not sure how that compares with current study. Results discussed with Dr. Condon\".      This final exam was discussed in detail with Dr. Vonda Condon by this writer.  She conveys that although the test is abnormal, the patient would be safe to discharge home if he has close follow up with his cardiologist.  He plans to call and make an appointment next week.  In addition, patient was told that should ANY degree of chest pain recur, he MUST take nitroglycerin.  In addition, he MUST call 911 if chest pain persists or should pain be moderate to severe. We will add 15mg of Imdur daily in the mean time to maximize medical management.  He will discharge to home on 100mg metoprolol twice daily, 40mg losartan once daily, and 325mg aspirin one daily (per ortho, was previously on 81mg).    Diabetes " "mellitus type 2 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 (H)  Diet controlled as an outpatient.  BG have been elevated while inpatient.  Sliding scale was not ordered on admit.  ON POD#3, the patient's A1c was 7.2%.  The patient will need to follow up with his primary care in regards to this issue within 7/10 days of discharge.    Post Operative Ileus - resolved  Developed 01/21 (afternoon of POD #2). Overnight, the patient had worsening pain and distention.  Had emesis x2.  Received Toradol and stated that he felt \"much better\" after administration. XR showed: \"Multiple dilated small bowel loops with air-fluid levels. This may be due to mechanical small bowel obstruction or possibly ileus.\" Patient was made NPO.  Unable to pass NG tube x4 due to resistance on POD #3. POD #4, abdomen far more soft. Nausea resolved.  Felt hungry.  In addition, the passed flatus and stool on morning of #4.  As such, the patient's diet was advanced.  POD #5, the patient had slightly more distention, but continued to pass stool and flatus.  The patient was tolerating a regular diet on day of discharge.  .     MAIDA (obstructive sleep apnea)  Continue home CPAP      A-fib (H)  Not on anticoagulant. S/p ablation 07/2014. Continue PTA metoprolol on discharge.      Walls's esophagus without dysplasia  Continue PTA Prilosec on discharge.      Benign essential hypertension  BP reviewed, controlled. Continue PTA amlodipine, losartan on discharge.      Benign non-nodular prostatic hyperplasia without lower urinary tract symptoms  Continue PTA oxybutynin on discharge.      Mixed hyperlipidemia  Continue PTA simvastatin on discharge.    Pending Results   Unresulted Labs Ordered in the Past 30 Days of this Admission     No orders found from 11/20/2017 to 1/20/2018.          Physical Exam   Temp:  [97.3  F (36.3  C)-98  F (36.7  C)] 97.9  F (36.6  C)  Pulse:  [58-79] 79  Heart Rate:  [57] 57  Resp:  [16] 16  BP: (128-147)/(59-79) 147/79  SpO2:  [95 %-98 %] 98 " %  Vitals:    01/19/18 1235 01/23/18 0256   Weight: 88.9 kg (196 lb) 93.8 kg (206 lb 12.7 oz)       General: alert and oriented x4, in no acute distress  CV: Regular rate/rhythm, no appreciable murmur, rub, or gallop  Respiratory: CTA bilaterally, equal chest expansion  GI: Soft, nontender, normoactive BS  Skin: Warm and dry  Musculoskeletal: negative homans bilaterally.  Non-tender to gross to palpation of posterior calves. Some pain to the musculature around the achilles heel to the left. bilateral edema to lower extremities, R>L.  Improved from previous day's edema assessment.  Neuro: equal  strength    The discharge plan was discussed with the patient and patient agrees to plan.    Total time on this discharge was greater than 30 minutes.        This discharge was discussed in detail with Dr. Vonda Condon.  Plan as above.    Sheyla Means PADaniellaC[facility]

## 2018-01-25 NOTE — PROGRESS NOTES
"Pt reports burning and sharp pain in the 2nd, 3rd and 4th toe on the left foot.  \"I just woke up and had this pain, I had that gal take off the compression socks to see if helped, but its not better.\"  Pt has good capillary refill, dorisal pedal pulse, flexion, extension and able to lift left without difficulties.  Pt's speech is clear, and is alert and oriented to person, place, time and situation.  (Knows he is having a stress test tomorrow due to chest pain on/off).  Updated pt's RN of pt's complaint.  "

## 2018-01-25 NOTE — PROGRESS NOTES
Waiting for Ortho to write a script for dilaudid before pt is discharged. Pt has been taking dilaudid every 3 hrs for right hip pain.

## 2018-01-25 NOTE — PROGRESS NOTES
"[facility] Medicine Progress Note  Date of Service: 01/25/2018    Assessment & Plan   Bryson Mendez is a 77 year old male who presented on 1/19/2018 for scheduled Procedure(s):  ARTHROPLASTY REVISION HIP by Vonda Condon MD and is being followed by the hospital medicine service for co-management of acute and/or chronic perioperative medical problems.    S/p Procedure(s):  ARTHROPLASTY REVISION HIP  - POD #6 (ortho signed off 01/24/2018)  - pain control, wound cares, physical therapy, occupational therapy and DVT prophylaxis per orthopedic surgery service      Chest Pain in the setting of known CAD (coronary artery disease)  Acute anterior MI 07/2007 (previous notation was incorrect, MI occurred in 2007, not 2017) requiring electric shock.  Underwent stent x2.  01/23, reported to this writer that he had had 2/10 chest pain at his inferior left sided rib cage.  Pain did not move.  Nothing made this better or worse.  Onset at rest.  No associated nausea, diaphoresis, SOB, numbness/paresthesias of the hands/feet.  Pre-op EKG: \"wide QRS complexes with possible frequent PACs/PVC's and bigeminy pattern. Change from comparison EKG 1/25/16 showing RBBB. Reviewed with cardiology with recommendation for possible decrease in beta-blocker dose. No acute findings.\" States he's only had this pain with previous ACS events.  Since that point in time, has had 2 other episodes of chest pain.  On EKG #2, T-waves in aVL, III, and aVF had inverted or flipped direction.  However, EKG #3 appears identical to that of EKG #1.  This was discussed with respiratory therapy who, while they did not preform the 2nd EKG, conveys that such findings may simply be a result of inappropriate lead placement.  Troponin negative x4.  Last stress test 04/2016: \"There is a small, mild, incompletely reversible defect at the lateral apex, consistent with a  small infarct and a small amount of ischemia in the same " "territory. LVEF is calculated to be 54%. Unchanged from study in 08/2014\"  - Lexiscan today  - hold AM metoprolol today   - continue telemetry  - EKG and Nitro ordered PRN for chest pain.  - Maalox PRN ordered  - no caffeine diet, NPO at 03:00 tomorrow in anticipation of stress testing.     Post Operative Ileus - resolved  Developed 01/21 (afternoon of POD #2). Overnight, worsening pain and distention.  Had emesis x2.  Received Toradol and states that he felt \"much better\" after administration. XR showed: \"Multiple dilated small bowel loops with air-fluid levels. This may be due to mechanical small bowel obstruction or possibly ileus.\" Patient was made NPO.  Unable to pass NG tube x4 due to resistance.  Today, abdomen far more soft. Nausea resolved.  Feels hungry.  Passed flatus and stool on morning of 01/23.  Continued to do so throughout the day.  Slight distention this 01/24/18, but tolerating regular diet.  - continue to encourage ambulation.      Diabetes mellitus type 2 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 (H)  Diet controlled as an outpatient.  BG have been elevated while inpatient.  Sliding scale was not ordered on admit.  A1c 7.2%  - start feeding SSI; medium SSI  - hyper/hypoglycemia protocol  - recommend outpatient follow up      MAIDA (obstructive sleep apnea)  Continue home CPAP      A-fib (H)  Not on anticoagulant. S/p ablation 07/2014  - continue PTA metoprolol (hold today)      Walls's esophagus without dysplasia  - continue PTA Prilosec      Benign essential hypertension  BP reviewed, controlled  - continue PTA amlodipine, losartan      Benign non-nodular prostatic hyperplasia without lower urinary tract symptoms  - continue PTA oxybutynin      Mixed hyperlipidemia  - continue PTA simvastatin          DVT Prophylaxis: as per orthopedic surgery service - Defer to primary service  Code Status: Full Code      Lines: PIV, without edema/erythema   Ordonez catheter: not indicated      Discussion: Medically, the patient " appears stable. Ileus appear to have resolved.  Unfortunately, the patient has ongoing chest pain which will necessitate stress testing prior to discharge.      Disposition: Anticipate discharge later today if stress test is normal.       Attestation:  I have reviewed today's vital signs, notes, medications, labs and imaging.      Sheyla Means PA-C      Interval History   Last episode of chest pain was yesterday afternoon at approximately noon.  This episode WAS associated with shortness of breath.  He received nitroglycerin and felt that both his pain and SOB improved within bout 10 minutes. Pain was entirely gone after a period of 15 minutes.  He is anxiously awaiting his stress test this afternoon.    Continues to pass flatus.  Feels that his abdomen is not distended.    Continues to have pain to his right knee.  Right LE is swollen, but he notes that this is chronically the case and that his right LE is chronically larger than that of his left.    He otherwise fever, chills, nausea, vomiting, palpitations/flutters, SOB, cough, abdominal pain,dysuria, LE pain.      Physical Exam   Temp:  [97.3  F (36.3  C)-97.6  F (36.4  C)] 97.3  F (36.3  C)  Pulse:  [51-71] 58  Heart Rate:  [57-86] 57  Resp:  [16-22] 16  BP: (118-150)/(53-60) 128/59  SpO2:  [95 %-97 %] 95 %    Weights:   Vitals:    01/19/18 1235 01/23/18 0256   Weight: 88.9 kg (196 lb) 93.8 kg (206 lb 12.7 oz)    Body mass index is 29.67 kg/(m^2).    General: alert and oriented x4, in no acute distress  CV: Regular rate/rhythm, no appreciable murmur, rub, or gallop  Respiratory: CTA bilaterally, equal chest expansion  GI: Soft, nontender, normoactive BS  Skin: Warm and dry  Musculoskeletal: negative homans bilaterally.  Non-tender to gross to palpation of posterior calves. Some pain to the musculature around the achilles heel to the left. bilateral edema to lower extremities, R>L.  Improved from previous day's edema assessment.  Neuro: equal   strength    Data     Recent Labs  Lab 01/25/18  0610 01/24/18  0705 01/24/18  0026 01/23/18  1820  01/23/18  0700 01/22/18  0715  01/19/18  1819   WBC  --  11.3*  --   --   --  13.1* 12.9*  --   --    HGB  --  12.0*  --   --   --  12.0* 12.1*  < >  --    MCV  --  90  --   --   --  91 92  --   --     168  --   --   --  148* 135*  --  118*   NA  --   --   --   --   --  137 138  --   --    POTASSIUM  --   --   --   --   --  4.0 4.4  --   --    CHLORIDE  --   --   --   --   --  107 104  --   --    CO2  --   --   --   --   --  26 28  --   --    BUN  --   --   --   --   --  12 14  --   --    CR  --   --   --   --   --  0.68 0.71  --  0.68   ANIONGAP  --   --   --   --   --  4 6  --   --    CHERI  --   --   --   --   --  8.0* 8.1*  --   --    GLC  --   --   --   --   --  121* 158*  --   --    TROPI  --  <0.015 0.017 0.018  < >  --   --   --   --    < > = values in this interval not displayed.      Recent Labs  Lab 01/25/18  0152 01/24/18 2058 01/24/18  1652 01/24/18  1119 01/24/18  0659 01/24/18  0137  01/23/18  0700  01/22/18  0715   GLC  --   --   --   --   --   --   --  121*  --  158*   * 144* 131* 170* 118* 150*  < >  --   < >  --    < > = values in this interval not displayed.     Unresulted Labs Ordered in the Past 30 Days of this Admission     No orders found from 11/20/2017 to 1/20/2018.           Imaging  No results found for this or any previous visit (from the past 24 hour(s)).     I reviewed all new labs and imaging results over the last 24 hours. I personally reviewed no images or EKG's today.    Medications       insulin aspart  1-7 Units Subcutaneous TID AC     insulin aspart  1-5 Units Subcutaneous At Bedtime     amLODIPine  5 mg Oral Daily     sodium chloride (PF)  3 mL Intracatheter Q8H     enoxaparin  40 mg Subcutaneous Q24H     senna-docusate  1-2 tablet Oral BID     omeprazole  40 mg Oral QPM     oxybutynin  10 mg Oral Daily     simvastatin  20 mg Oral At Bedtime     losartan  50 mg Oral  Daily       Sheyla Means PA-C

## 2018-01-25 NOTE — DISCHARGE INSTRUCTIONS
1. Please follow up with your cardiologist as soon as possible to discuss your stress test and possible angiogram.  Use your sublingual nitroglycerin with any chest pain and call 911    2. Please start taking 15mg of Imdur once daily. This medication is waiting for you at the [facility]. This is a medication that you should NOT take with your Viagra.    3. Please see Thien Davison within 7-10 days of discharge; we would like for to talk to him about your diabetes.  Your A1c while you were in the hospital was 7.2%.  This is an indciator that your Type II Diabetes has likely progressed to the point in which you will need to take diabetes medications.

## 2018-01-25 NOTE — PLAN OF CARE
"Problem: Hip Arthroplasty (Total, Partial) (Adult)  Goal: Signs and Symptoms of Listed Potential Problems Will be Absent, Minimized or Managed (Hip Arthroplasty)  Signs and symptoms of listed potential problems will be absent, minimized or managed by discharge/transition of care (reference Hip Arthroplasty (Total, Partial) (Adult) CPG).   Outcome: Improving  Pt alert, oriented, SBA with walker. Prn dilaudid given for R hip pain. Pt denies nausea, SOB. No chest pain overnight. NPO since 0330. Pt has BLLE edema +2. /59 (BP Location: Left arm)  Pulse 58  Temp 97.3  F (36.3  C) (Axillary)  Resp 16  Ht 1.778 m (5' 10\")  Wt 93.8 kg (206 lb 12.7 oz)  SpO2 95%  BMI 29.67 kg/m2        "

## 2018-01-26 NOTE — PROGRESS NOTES
[facility] DISCHARGE NOTE    Patient discharged to home at 6:30 PM via wheel chair. Accompanied by spouse and staff. Discharge instructions reviewed with patient, opportunity offered to ask questions. Prescriptions sent to patients preferred pharmacy. All belongings sent with patient.    Vane Larkin RN

## 2018-01-26 NOTE — PROGRESS NOTES
"[facility] Orthopaedics Progress Note      Post-operative Day: 6 Days Post-Op    Procedure(s):  Right Hip Acetabulum Revision - Wound Class: I-Clean      Subjective:    Pain: minimal - states pain in hip has been present but he has not been taking narcotics due to ileus. As this is resolved he has increased his Dilaudid which has been providing good relief  Chest pain, SOB:  Yes - chest pain/SOB with ambulation. Under went stress test today.      Objective:  Blood pressure 147/79, pulse 79, temperature 97.9  F (36.6  C), temperature source Oral, resp. rate 16, height 1.778 m (5' 10\"), weight 93.8 kg (206 lb 12.7 oz), SpO2 98 %.    Patient Vitals for the past 24 hrs:   BP Temp Temp src Pulse Heart Rate Resp SpO2   01/25/18 1529 147/79 97.9  F (36.6  C) Oral 79 - - 98 %   01/25/18 1138 133/63 97.9  F (36.6  C) Oral 71 - 16 97 %   01/25/18 0805 144/60 98  F (36.7  C) Oral 76 - 16 96 %   01/24/18 2315 - - - - 57 - -   01/24/18 2253 128/59 97.3  F (36.3  C) Axillary 58 - 16 95 %       Wt Readings from Last 4 Encounters:   01/23/18 93.8 kg (206 lb 12.7 oz)   07/07/15 93 kg (205 lb)   06/25/15 93 kg (205 lb)   05/17/15 90.7 kg (200 lb)         Motor function, sensation, and circulation intact   Yes  Wound status: incisions are clean dry and intact. Yes - Prineo intact  Calf tenderness: Bilateral  No - but significant swelling to RLE consistent with post surgical edema    Pertinent Labs   Lab Results: personally reviewed.     Recent Labs   Lab Test  01/25/18   0610  01/24/18   0705  01/23/18   0700  01/22/18   0715   07/16/14   0520  07/15/14   1005   07/13/14   1940  06/20/13   0630   INR   --    --    --    --    --    --   1.14   --   1.16*  1.41*   HGB   --   12.0*  12.0*  12.1*   < >  13.3   --    < >  15.3   --    HCT   --   36.1*  36.4*  36.8*   --    --    --    < >  44.1   --    MCV   --   90  91  92   --    --    --    < >  93   --    PLT  186  168  148*  135*   < >   --    --    < >  213   --    NA   --    --   " 137  138   --   134   --    < >  137   --     < > = values in this interval not displayed.       Plan: Anticoagulation protocol: Lovenox inpatient and then  mg daily at discharge  x 42  Days - ASA duration may need to be prolonged pending cardiologist recommendations            Pain medications:  dilaudid and tylenol            Weight bearing status:  WBAT            Disposition:  Home today. Follow up with [facility] in 7-10 days             Continue cares and rehabilitation     Report completed by:  Bekah Bhatia PA-C  Date: 1/25/2018  Time: 6:04 PM

## 2018-01-30 ENCOUNTER — HOSPITAL ENCOUNTER (OUTPATIENT)
Dept: PHYSICAL THERAPY | Facility: CLINIC | Age: 78
Setting detail: THERAPIES SERIES
End: 2018-01-30
Attending: PHYSICIAN ASSISTANT
Payer: MEDICARE

## 2018-01-30 PROCEDURE — 40000718 ZZHC STATISTIC PT DEPARTMENT ORTHO VISIT: Performed by: PHYSICAL THERAPIST

## 2018-01-30 PROCEDURE — 97110 THERAPEUTIC EXERCISES: CPT | Mod: GP | Performed by: PHYSICAL THERAPIST

## 2018-01-30 PROCEDURE — G8979 MOBILITY GOAL STATUS: HCPCS | Mod: GP,CI | Performed by: PHYSICAL THERAPIST

## 2018-01-30 PROCEDURE — 97161 PT EVAL LOW COMPLEX 20 MIN: CPT | Mod: GP | Performed by: PHYSICAL THERAPIST

## 2018-01-30 PROCEDURE — 97112 NEUROMUSCULAR REEDUCATION: CPT | Mod: GP | Performed by: PHYSICAL THERAPIST

## 2018-01-30 PROCEDURE — G8978 MOBILITY CURRENT STATUS: HCPCS | Mod: GP,CK | Performed by: PHYSICAL THERAPIST

## 2018-01-30 NOTE — PROGRESS NOTES
[facility]          OUTPATIENT PHYSICAL THERAPY ORTHOPEDIC EVALUATION  PLAN OF TREATMENT FOR OUTPATIENT REHABILITATION  (COMPLETE FOR INITIAL CLAIMS ONLY)  Patient's Last Name, First Name, M.I.  YOB: 1940  Bryson Mendez    Provider s Name:  [facility]   Medical Record No.  [id number]   Start of Care Date:  01/30/18   Onset Date:  01/19/18   Type:     _X__PT   ___OT   ___SLP Medical Diagnosis:  s/p revision of total hip     PT Diagnosis:  s/p right hip revision 1/19/18   Visits from SOC:  1      _________________________________________________________________________________  Plan of Treatment/Functional Goals:  ADL retraining, balance training, gait training, manual therapy, motor coordination training, neuromuscular re-education, ROM, strengthening, stretching     Cryotherapy, Electrical stimulation     Goals     Goal Description: Patient will be able to walk >=2 blocks without an assistive device.  Target Date: 03/27/18       Goal Description: Patient will be able to climb stairs with reciprocal step pattern.  Target Date: 03/27/18       Goal Description: Patient will be able to perform right single leg stance for >=15 seconds to indicate improved LE proprioception and strength.  Target Date: 03/13/18           Therapy Frequency:  1 time/week  Predicted Duration of Therapy Intervention:  8 weeks    Ruba Escobar, PT                 I CERTIFY THE NEED FOR THESE SERVICES FURNISHED UNDER        THIS PLAN OF TREATMENT AND WHILE UNDER MY CARE .             Physician Signature               Date    X_____________________________________________________                             Certification Date From:  01/30/18   Certification Date To:  04/24/18    Referring Provider:  Ene Eugene PA-C      Initial Assessment        See Epic Evaluation Start of Care Date: 01/30/18

## 2018-01-30 NOTE — PROGRESS NOTES
01/30/18 1300   General Information   Type of Visit Initial OP Ortho PT Evaluation   Start of Care Date 01/30/18   Referring Physician Ene Eugene PA-C     Patient/Family Goals Statement Back to walking faster and longer, to take care of thigh pain; to exercise for diabetes   Orders Evaluate and Treat   Date of Order 01/19/18   Insurance Type Other   Insurance Comments/Visits Authorized Medicare for HB sup; BCBS plat ($2010 remaining in PT/SLP cap; no iontophoresis)   Medical Diagnosis s/p revision of total hip   Surgical/Medical history reviewed Yes   Precautions/Limitations no known precautions/limitations       Present No   Body Part(s)   Body Part(s) Hip   Presentation and Etiology   Pertinent history of current problem (include personal factors and/or comorbidities that impact the POC) Per patient: he knew his ROMAN was wrong 4 days after surgery.  Waited a little while to get things addressed, then had the replacement surgery.  While in the hospital he experienced shortness of breath and cardiac problems - needing an angiogram for this.  Otherwise, the replacement went really well.     Impairments A. Pain;C. Swelling;J. Burning   Functional Limitations perform activities of daily living;perform desired leisure / sports activities   Symptom Location Right lateral thigh to mid calf   How/Where did it occur From Degenerative Joint Disease   Onset date of current episode/exacerbation 01/19/18   Chronicity New   Pain rating (0-10 point scale) Best (/10);Worst (/10)   Best (/10) 1   Worst (/10) 7   Pain quality C. Aching;D. Burning;F. Stabbing   Frequency of pain/symptoms A. Constant   Pain/symptoms are: The same all the time   Pain/symptoms exacerbated by B. Walking   Pain/symptoms eased by J. Braces/supports   Progression of symptoms since onset: Improved   Current / Previous Interventions   Diagnostic Tests: X-ray   X-ray Results Results   X-ray results 1/19/18 XR pelvis: FINDINGS:  Bilateral total hip arthroplasties. Components appear well seated.   Prior Level of Function   Prior Level of Function-Mobility Independent   Prior Level of Function-ADLs Independent   Current Level of Function   Current Community Support Family/friend caregiver   Patient role/employment history F. Retired   Living environment [place]   Fall Risk Screen   Fall screen completed by PT   Have you fallen 2 or more times in the past year? No   Have you fallen and had an injury in the past year? No   Is patient a fall risk? No   Functional Scales   Functional Scales Other   Other Scales  See LEFS   Hip Objective Findings   Side (if bilateral, select both right and left) Right   Integumentary  Figure 8 for edema right=69.5 cm; left=63.5 cm   Balance/Proprioception (Single Leg Stance) Right=2 seconds; left=4 seconds   Right Hip Flexion Strength Right=4/5; Left=5/5   Right Hip Abduction Strength Right=3+/5; Left=4/5   Right Hip Extension Strength [place]=4/5   Right Knee Flexion Strength Right=4/5; Left=5/5   Right Knee Extension Strength Right=4/5; Left=5/5   Planned Therapy Interventions   Planned Therapy Interventions ADL retraining;balance training;gait training;manual therapy;motor coordination training;neuromuscular re-education;ROM;strengthening;stretching   Planned Modality Interventions   Planned Modality Interventions Cryotherapy;Electrical stimulation   Clinical Impression   Criteria for Skilled Therapeutic Interventions Met yes, treatment indicated   PT Diagnosis s/p right hip revision 1/19/18   Influenced by the following impairments Pain; weakness; impaired gait; impaired proprioception   Functional limitations due to impairments Pain and difficulty walking, climbing stairs, performing ADL's   Clinical Presentation Stable/Uncomplicated   Clinical Presentation Rationale (+) motivation   (-) revision   Clinical Decision Making (Complexity) Low complexity   Therapy Frequency 1 time/week   Predicted Duration of  Therapy Intervention (days/wks) 8 weeks   Risk & Benefits of therapy have been explained Yes   Patient, Family & other staff in agreement with plan of care Yes   Clinical Impression Comments Bryson is a pleasant 78 yo male who arrives today 1.5 weeks s/p right hip revision.  He will benefit from skilled PT to address the above impairments and functional limitations.     Education Assessment   Preferred Learning Style Listening;Demonstration;Pictures/video   ORTHO GOALS   PT Ortho Eval Goals 1;2;3   Ortho Goal 1   Goal Description Patient will be able to walk >=2 blocks without an assistive device.   Target Date 03/27/18   Ortho Goal 2   Goal Description Patient will be able to climb stairs with reciprocal step pattern.   Target Date 03/27/18   Ortho Goal 3   Goal Description Patient will be able to perform right single leg stance for >=15 seconds to indicate improved LE proprioception and strength.   Target Date 03/13/18   Total Evaluation Time   Total Evaluation Time 15   Therapy Certification   Certification date from 01/30/18   Certification date to 04/24/18   Medical Diagnosis s/p revision of total hip     Ruba Escobar, PT, DPT  Doctor of Physical Therapy #[id number]  [facility]  [phone]  [email or web address]

## 2018-02-05 ENCOUNTER — HOSPITAL ENCOUNTER (OUTPATIENT)
Dept: ULTRASOUND IMAGING | Facility: CLINIC | Age: 78
Discharge: HOME | End: 2018-02-05
Attending: ORTHOPAEDIC SURGERY | Admitting: ORTHOPAEDIC SURGERY
Payer: MEDICARE

## 2018-02-05 DIAGNOSIS — M79.89 LEG SWELLING: ICD-10-CM

## 2018-02-05 PROCEDURE — 93971 EXTREMITY STUDY: CPT | Mod: RT

## 2018-02-06 ENCOUNTER — HOSPITAL ENCOUNTER (OUTPATIENT)
Dept: PHYSICAL THERAPY | Facility: CLINIC | Age: 78
Setting detail: THERAPIES SERIES
End: 2018-02-06
Attending: PHYSICIAN ASSISTANT
Payer: MEDICARE

## 2018-02-06 PROCEDURE — 40000718 ZZHC STATISTIC PT DEPARTMENT ORTHO VISIT: Performed by: PHYSICAL THERAPIST

## 2018-02-06 PROCEDURE — G8979 MOBILITY GOAL STATUS: HCPCS | Mod: GP,CI | Performed by: PHYSICAL THERAPIST

## 2018-02-06 PROCEDURE — G8980 MOBILITY D/C STATUS: HCPCS | Mod: GP,CK | Performed by: PHYSICAL THERAPIST

## 2018-02-06 PROCEDURE — 97110 THERAPEUTIC EXERCISES: CPT | Mod: GP | Performed by: PHYSICAL THERAPIST

## 2018-02-13 ENCOUNTER — MEDICAL CORRESPONDENCE (OUTPATIENT)
Dept: HEALTH INFORMATION MANAGEMENT | Facility: CLINIC | Age: 78
End: 2018-02-13

## 2018-02-13 ENCOUNTER — HOSPITAL ENCOUNTER (OUTPATIENT)
Dept: OCCUPATIONAL THERAPY | Facility: CLINIC | Age: 78
Setting detail: THERAPIES SERIES
End: 2018-02-13
Attending: PHYSICIAN ASSISTANT
Payer: MEDICARE

## 2018-02-13 PROCEDURE — 97166 OT EVAL MOD COMPLEX 45 MIN: CPT | Mod: GO | Performed by: OCCUPATIONAL THERAPIST

## 2018-02-13 PROCEDURE — G8991 OTHER PT/OT GOAL STATUS: HCPCS | Mod: GO,CI | Performed by: OCCUPATIONAL THERAPIST

## 2018-02-13 PROCEDURE — 97535 SELF CARE MNGMENT TRAINING: CPT | Mod: GO,XU | Performed by: OCCUPATIONAL THERAPIST

## 2018-02-13 PROCEDURE — 97530 THERAPEUTIC ACTIVITIES: CPT | Mod: GO | Performed by: OCCUPATIONAL THERAPIST

## 2018-02-13 PROCEDURE — G8990 OTHER PT/OT CURRENT STATUS: HCPCS | Mod: GO,CK | Performed by: OCCUPATIONAL THERAPIST

## 2018-02-13 PROCEDURE — 40000125 ZZHC STATISTIC OT OUTPT VISIT: Performed by: OCCUPATIONAL THERAPIST

## 2018-02-13 ASSESSMENT — ACTIVITIES OF DAILY LIVING (ADL): IADL_QUICK_ADDS: HOME/FINANCIAL/MANAGEMENT;COMMUNICATION/COMPUTER USE;COMMUNITY MOBILITY

## 2018-02-13 ASSESSMENT — VISUAL ACUITY: OU: 20/25

## 2018-02-14 ENCOUNTER — HOSPITAL ENCOUNTER (OUTPATIENT)
Dept: SPEECH THERAPY | Facility: CLINIC | Age: 78
Setting detail: THERAPIES SERIES
End: 2018-02-14
Attending: FAMILY MEDICINE
Payer: MEDICARE

## 2018-02-14 PROCEDURE — G9163 LANG EXPRESS GOAL STATUS: HCPCS | Mod: GN,CI | Performed by: SPEECH-LANGUAGE PATHOLOGIST

## 2018-02-14 PROCEDURE — 92523 SPEECH SOUND LANG COMPREHEN: CPT | Mod: GN | Performed by: SPEECH-LANGUAGE PATHOLOGIST

## 2018-02-14 PROCEDURE — 40000211 ZZHC STATISTIC SLP  DEPARTMENT VISIT: Performed by: SPEECH-LANGUAGE PATHOLOGIST

## 2018-02-14 PROCEDURE — G9162 LANG EXPRESS CURRENT STATUS: HCPCS | Mod: GN,CK | Performed by: SPEECH-LANGUAGE PATHOLOGIST

## 2018-02-14 NOTE — PROGRESS NOTES
02/13/18 0900   Quick Adds   Type of Visit Initial Outpatient Occupational Therapy Evaluation       Present No   General Information   Start Of Care Date 02/13/18   Referring Physician Dr. Kristine Amezquita   Orders Evaluate and treat as indicated   Other Orders Other cognitive impairment, Impairment in instrumental ADL's, Driving   Orders Date 02/09/18   Medical Diagnosis CVA   Onset of Illness/Injury or Date of Surgery 02/07/18   Precautions/Limitations Fall precautions   Surgical/Medical History Reviewed Yes   Additional Occupational Profile Info/Pertinent History of Current Problem On January 19th, Romeo had right ROMAN completed.  He experienced chest pain and it was recommended that he follow up with cardiology.  The cardiologist performed an angiogram on 2/7/2018.  When bringing him out of anesthethia he was found to have weakness on the right side with a facial droop.  He was immediately given TPA x 10 minutes.  He was hospitalized until 2/9/2018 and returned home.  Overall he and his wife feel he is getting better.  His biggest concerns are with speaking, finding the right words.  He is currently not completing his medication management, is having difficulty managing his finances and is not driving.     Role/Living Environment   Current Community Support Family/friend caregiver   Patient role/Employment history Retired   Current Living Environment House   Number of Stairs to Enter Home 3   Number of Stairs Within Home 15 to basement   Prior Level - Transfers Independent   Prior Level - Ambulation Independent   Prior Level - ADLS Independent   Prior Responsibilities - IADL Yardwork;Medication management;Finances;Driving   Current Assistive Devices - Mobility Front wheeled walker   Current Assistive Devices - ADL Sock aide;Reacher   Role/Living Environment Comments retired gentleman who does activity around his home prior to his hip replacement and stroke   Pain   Patient currently in pain  No   Pain comments He does report that he has had some pain in his right hip, but not now   Fall Risk Screen   Fall screen completed by OT   Fall screen comments he currently has been seeing PT for his right ROMAN and will continue with PT with also a neuro base.  Discussed with him safety as it relates to times when he feels he has double vision   Cognitive Status Examination   Orientation Orientation to person, place and time   Level of Consciousness Alert   Follows Commands and Answers Questions Able to follow multistep instructions;75% of the time   Personal Safety and Judgment Intact   Memory Impaired   Attention Alternating attention impaired, difficulty shifting between tasks;Divided attention impaired, difficulty with simultaneous tasks   Organization/Problem Solving Problem solving impaired   Executive Function Working memory impaired, decreased storage of information for performing tasks;Cognitive flexibility impaired;Planning ability impaired   Cognitive Comment Attempted the SDMT:  He completed 12 placing him below -3.0SD.  He struggles to complete and particpate with the Cognistat becoming frustrated.  Will plan to continue   Visual Perception   Visual Perception Wears glasses   Visual Acuity 20/25   Visual Field full scanning with letter scan   Oculomotor full oculomotor movement   Sensation   Upper Extremity Sensory Examination No deficits were identified   Hand Strength   Hand Dominance Right   Hand Strength Comments Overall Romeo feels there is not difference in  strength, further assess.   Muscle Tone   Muscle Tone No deficits were identified   Coordination   Upper Extremity Coordination No deficits were identified   Functional Mobility   Ambulation ambulatory with front wheel walker   Transfer Skill   Level of [place]: Transfers independent   Bathing   Level of [place] - Bathing independent   Upper Body Dressing   Level of [place]: Dress Upper Body independent   Lower Body  Dressing   Level of [place]: Dress Lower Body independent   Toileting   Level of [place]: Toilet independent   Grooming   Level of [place]: Grooming independent   Eating/Self-Feeding   Level of [place]: Eating independent   Activity Tolerance   Activity Tolerance significantly declined since stroke:  education provided on sleep hygiene   Instrumental Activities of Daily Living Assessment   IADL Quick Adds Home/Financial/Management;Communication/Computer Use;Community Mobility   Home/Financial Management Difficulty organizing the process of paying his bills   Communication/Computer Use Difficulty communicating inperson and on phone with coming up with the correct words   Community Mobility Currently not driving, was prior to the stroke   Planned Therapy Interventions   Planned Therapy Interventions IADL training;Cognitive skills;Self care/Home management;Therapeutic activities;Visual perception   Adult OT Eval Goals   OT Eval Goals (Adult) 1;2;3;4   OT Goal 1   Goal Identifier Education and Home Programming   Goal Description Client and caregiver will verbalize an understanding of the findings on the assessment and home programming with each visit   Target Date 04/15/18   OT Goal 2   Goal Identifier Medication Management   Goal Description Client will demonstrate independence with medication management on a daily basis   Target Date 03/15/18   OT Goal 3   Goal Identifier Finance Management   Goal Description Client will demonstrate the ability to complete bill/account/money management skill task in 2 consecutive sessions in prep of carry over to home   Target Date 03/15/18   OT Goal 4   Goal Identifier Driving   Goal Description Client will particpate and complete predriving screen with adequate scoring in prep of returning to driving   Target Date 04/14/18   Clinical Impression   Criteria for Skilled Therapeutic Interventions Met Yes, treatment indicated   OT Diagnosis Impaired executive and  IADL's as a result of CVA   Influenced by the following impairments word finding/language impairment, fatigue, impaired mobility, impaired cognition   Assessment of Occupational Performance 3-5 Performance Deficits   Identified Performance Deficits home managment/IADL's:  money management, medication management, driving, processing of information   Clinical Decision Making (Complexity) Moderate complexity   Therapy Frequency 2x week x 8 weeks   Predicted Duration of Therapy Intervention (days/wks) 8 weeks   Risks and Benefits of Treatment have been explained. Yes   Patient, Family & other staff in agreement with plan of care Yes   Clinical Impression Comments 77 year old male who presents with impaired IADL skills and language deficit.  Appropriate for OT intervention with focus on the above goals.  Also appropriate for referral recommendation to Speech Therapy   Education Assessment   Barriers To Learning Language  (requesting speech referral)   Total Evaluation Time   Total Evaluation Time 30

## 2018-02-14 NOTE — PROGRESS NOTES
" Speech Language Evaluation  02/14/18    General Information   Type of Evaluation Speech and Language   Type Of Visit Initial   Start Of Care Date 02/14/18   Referring Physician Thien Davison MD   Orders Evaluate And Treat   Orders Comment Speech Language Impairment   Medical Diagnosis CVA   Onset Of Illness/injury Or Date Of Surgery 02/13/18  (order date)   Precautions/Limitations no known precautions/limitations   Surgical/Medical history reviewed Yes   Pertinent History Of Current Problem On January 19th, Romeo had right ROMAN completed.  He experienced chest pain and it was recommended that he follow up with cardiology.  The cardiologist performed an angiogram on 2/7/2018.  When bringing him out of anesthethia he was found to have weakness on the right side with a facial droop.  He was immediately given TPA x 10 minutes.  He was hospitalized until 2/9/2018 and returned home.  Overall he and his wife feel he is getting better.  His biggest concerns are with speaking, finding the right words.  He is currently not completing his medication management, is having difficulty managing his finances and is not driving.     Prior Level Of Function Comment independent with no speech language deficits.  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   Current Community Support  Family/friend caregiver   Patient Role/employment History Employed   Living environment [place]   Home/community Accessibility Comments (flowsheet Row) wife has been  since CVA,  uses assistive device for ambulation   General Observations Pt alert and cooperative.  He is motivated to improve word finding ability and organize expressive language skills.   Patient/family Goals For patient to be able to return to work as guard and communicate effectively for job duties.   General Information Comments Pt stated \" I feel distant and discombobulated when I try to talk.\"   Fall Risk Screen   Fall screen completed by SLP   Fall screen comments he " currently has been seeing PT for his right ROMAN and will continue with PT with also a neuro base.    Pain Assessment   Pain Reported No   Oral Motor Sensory Function   Comments WFL   Speech   Deficits in Speech Respiration None   Deficits in Phonation None   Deficits in Articulation None   Deficits in Resonance None   Deficits in Prosody None   Speech Comments Speech intelligibility WNL   Language: Auditory Comprehension (understanding of spoken language)   Tests were administered at the following levels Moderate (routine daily activities)   Yes/No Sentence and Simple Paragraph; [place] Diagnostic Aphasia Exam 3 short (out of 6 total) (60% acc)   Functional Assessment Scale (Auditory Comprehension) Mild to Moderate Impairment   Comments (Auditory Comprehension) Good comprehension for conversation but decreases in more complex, detailed material.   Language: Verbal Expression (use of spoken language to express information)   Tests were administered at the following levels Moderate (routine daily activities)   [place] Naming Test, start at 30 (out of 60 total) 49   Generate Sentences; [place] Test for Differential Diagnosis Of Aphasia (out of 6 total) 6   Functional Assessment Scale (Verbal Expression) Moderate Impairment   Comments (Verbal Expression) Sentences mostly complete but not always relevant.  Marked word finding difficulty with mild semantic jargon.  Pt has difficulty being concise to communicate his message.  Phonemic cues for word finding not effective.  Most succes with semantic cues or patient looking at list of choices of words.   Reading Comprehension (understanding of written language)   Tests were administered at the following levels Moderate (routine daily activities)   Comments (Reading Comprehension) WFL for sentences.  DNT for complex paragragraphs.   Written Expression (use of writing to express information)   Tests were administered at the following levels Moderate (routine daily activities)    Functional Assessment Scale (Written Expression) Minimal Impairment   Comments (Written Expression) Pt able to write name, word, and generate sentences.  Mild deficts with legibility.     Pragmatics (the social or functional use of a language)   Deficits noted in Nonverbal None   Deficits noted in Conversational Skills [facility]   Education Assessment   Barriers to Learning Cognitive  (possible memory deficts per OT)   General Therapy Interventions   Planned Therapy Interventions Language   Language Verbal expression;Auditory comprehension;Written expression;Reading comprehension   Clinical Impression, SLP Eval   Criteria for Skilled Therapeutic Interventions Met yes;treatment indicated   SLP Diagnosis mild-moderate receptive and expressive aphasia   Functional limitations due to impairments difficulty expressing self in social settings or more complex conversations for work.   Therapy Frequency 2 times;per week   Predicted Duration of Therapy Intervention (days/wks) 12 weeks   Risks and Benefits of Treatment have been explained. Yes   Patient, Family & other staff in agreement with plan of care Yes   Clinical Impression Comments Pt presents with mild-moderate aphasia, most noticable with verbal expression due to word finding deficits and disorganized expressive language for  complex  messages.  ST recommended for language skills for communication in daily life and with patient's goal to return to work.   Language/Cognition Goal 1   Goal Identifier verbal expression   Goal Description Pt will name 10 members from a concrete category 85% of the time to increase word finding skills.   Target Date 03/16/18   Language/Cognition Goal 2   Goal Identifier verbal expression   Goal Description Pt will perform naming tasks with 85% acc (ie opposites, analogies, confrontational naming)   Target Date 03/16/18   Language/Cognition Goal 3   Goal Identifier auditory comprehension   Goal Description Pt will answer questions  after paragraph with 85% acc   Target Date 03/16/18   Language/Cognition Goal 4   Goal Identifier writing   Goal Description Pt will write short paragraph for functional writing of thank you note, work report with 85% acc   Target Date 03/16/18   Language/Cognition Goal 5   Goal Identifier verbal expression   Goal Description Pt will tell a mod-complex story with beginning, middle, and end given a topic with 80% acc   Language/Cognition Goal 6   Goal Identifier reading comprehension   Goal Description Pt will read and follow directions for moderately complex information with 70% acc   Target Date 04/15/18   Total Session Time   Total Evaluation Time 60   Therapy Certification   Certification date from 02/14/18   Certification date to 05/15/18   Medical Diagnosis CVA   Certification I certify the need for these services furnished under this plan of treatment and while under my care.  (Physician co-signature of this document indicates review and certification of the therapy plan).

## 2018-02-14 NOTE — PROGRESS NOTES
[facility]          OUTPATIENT SPEECH LANGUAGE PATHOLOGY LANGUAGE-COGNITION  EVALUATION  PLAN OF TREATMENT FOR OUTPATIENT REHABILITATION  (COMPLETE FOR INITIAL CLAIMS ONLY)  Patient's Last Name, First Name, M.I.  YOB: 1940  Bryson Mendez                        Provider s Name: [facility] Medical Record No.  [id number]     Onset Date:  02/13/18 (order date)   Start of Care Date: 02/14/18   Type:     ___PT  __OT   _X_SLP    Medical Diagnosis:  CVA   Speech Language Pathology Diagnosis:  mild-moderate receptive and expressive aphasia    Visits from SOC: 1                                        ________________________________________________________________________________  Plan of Treatment/Functional Goals:   Planned Therapy Interventions: Language          Language / Cognition Goals  1. Goal Identifier: verbal expression       Goal Description: Pt will name 10 members from a concrete category 85% of the time to increase word finding skills.       Target Date: 03/16/18   2. Goal Identifier: verbal expression       Goal Description: Pt will perform naming tasks with 85% acc (ie opposites, analogies, confrontational naming)       Target Date: 03/16/18   3. Goal Identifier: auditory comprehension       Goal Description: Pt will answer questions after paragraph with 85% acc       Target Date: 03/16/18   4. Goal Identifier: writing       Goal Description: Pt will write short paragraph for functional writing of thank you note, work report with 85% acc       Target Date: 03/16/18   5. Goal Identifier: verbal expression       Goal Description: Pt will tell a mod-complex story with beginning, middle, and end given a topic with 80% acc          6.  Goal Identifier: reading comprehension       Goal Description: Pt will read and follow directions for moderately complex information with 70%  acc       Target Date: 04/15/18                      Sarah Andrade MA, CCC/SLP       I CERTIFY THE NEED FOR THESE SERVICES FURNISHED UNDER        THIS PLAN OF TREATMENT AND WHILE UNDER MY CARE .             Physician Signature               Date    X_____________________________________________________                        Certification Date From:  02/14/18  Certification Date To:   05/15/18          Referring Physician:  Thien Davison MD    Initial Assessment        See Epic Evaluation Start Of Care Date: 02/14/18

## 2018-02-14 NOTE — PROGRESS NOTES
[facility]          OUTPATIENT OCCUPATIONAL THERAPY  EVALUATION  PLAN OF TREATMENT FOR OUTPATIENT REHABILITATION  (COMPLETE FOR INITIAL CLAIMS ONLY)  Patient's Last Name, First Name, M.I.  YOB: 1940  Bryson Mendez                        Provider's Name  [facility] Medical Record No.  [id number]                               Onset Date:     02/07/18   Start of Care Date:     02/13/18   Type:     ___PT   _X_OT   ___SLP Medical Diagnosis:     CVA                          OT Diagnosis:     Impaired executive and IADL's as a result of CVA Visits from SOC:  1   _________________________________________________________________________________  Plan of Treatment/Functional Goals:  IADL training, Cognitive skills, Self care/Home management, Therapeutic activities, Visual perception      Goals  Goal Identifier: Education and Home Programming  Goal Description: Client and caregiver will verbalize an understanding of the findings on the assessment and home programming with each visit  Target Date: 04/15/18     Goal Identifier: Medication Management  Goal Description: Client will demonstrate independence with medication management on a daily basis  Target Date: 03/15/18     Goal Identifier: Finance Management  Goal Description: Client will demonstrate the ability to complete bill/account/money management skill task in 2 consecutive sessions in prep of carry over to home  Target Date: 03/15/18     Goal Identifier: Driving  Goal Description: Client will particpate and complete predriving screen with adequate scoring in prep of returning to driving  Target Date: 04/14/18      Therapy Frequency: 2x week x 8 weeks     Predicted Duration of Therapy Intervention (days/wks): 8 weeks  GLORIA Beltran CERTIFY THE NEED FOR THESE SERVICES FURNISHED UNDER        THIS PLAN OF TREATMENT AND  WHILE UNDER MY CARE .             Physician Signature               Date    X_____________________________________________________               Referring Physician: Dr. Kristine Amezquita     Initial Assessment        See Epic Evaluation      Start Of Care Date: 02/13/18

## 2018-02-16 ENCOUNTER — HOSPITAL ENCOUNTER (OUTPATIENT)
Dept: OCCUPATIONAL THERAPY | Facility: CLINIC | Age: 78
Setting detail: THERAPIES SERIES
End: 2018-02-16
Attending: PHYSICAL MEDICINE & REHABILITATION
Payer: MEDICARE

## 2018-02-16 ENCOUNTER — HOSPITAL ENCOUNTER (OUTPATIENT)
Dept: SPEECH THERAPY | Facility: CLINIC | Age: 78
Setting detail: THERAPIES SERIES
End: 2018-02-16
Attending: FAMILY MEDICINE
Payer: MEDICARE

## 2018-02-16 PROCEDURE — 40000211 ZZHC STATISTIC SLP  DEPARTMENT VISIT: Performed by: SPEECH-LANGUAGE PATHOLOGIST

## 2018-02-16 PROCEDURE — 40000125 ZZHC STATISTIC OT OUTPT VISIT: Performed by: OCCUPATIONAL THERAPIST

## 2018-02-16 PROCEDURE — 92507 TX SP LANG VOICE COMM INDIV: CPT | Mod: GN | Performed by: SPEECH-LANGUAGE PATHOLOGIST

## 2018-02-16 PROCEDURE — 97530 THERAPEUTIC ACTIVITIES: CPT | Mod: GO | Performed by: OCCUPATIONAL THERAPIST

## 2018-02-20 ENCOUNTER — HOSPITAL ENCOUNTER (OUTPATIENT)
Dept: PHYSICAL THERAPY | Facility: CLINIC | Age: 78
Setting detail: THERAPIES SERIES
End: 2018-02-20
Attending: PHYSICAL MEDICINE & REHABILITATION
Payer: MEDICARE

## 2018-02-20 ENCOUNTER — HOSPITAL ENCOUNTER (OUTPATIENT)
Dept: SPEECH THERAPY | Facility: CLINIC | Age: 78
Setting detail: THERAPIES SERIES
End: 2018-02-20
Attending: FAMILY MEDICINE
Payer: MEDICARE

## 2018-02-20 ENCOUNTER — HOSPITAL ENCOUNTER (OUTPATIENT)
Dept: OCCUPATIONAL THERAPY | Facility: CLINIC | Age: 78
Setting detail: THERAPIES SERIES
End: 2018-02-20
Attending: PHYSICAL MEDICINE & REHABILITATION
Payer: MEDICARE

## 2018-02-20 PROCEDURE — 97110 THERAPEUTIC EXERCISES: CPT | Mod: GP | Performed by: PHYSICAL THERAPIST

## 2018-02-20 PROCEDURE — G8978 MOBILITY CURRENT STATUS: HCPCS | Mod: GP,CK | Performed by: PHYSICAL THERAPIST

## 2018-02-20 PROCEDURE — 40000719 ZZHC STATISTIC PT DEPARTMENT NEURO VISIT: Performed by: PHYSICAL THERAPIST

## 2018-02-20 PROCEDURE — 40000125 ZZHC STATISTIC OT OUTPT VISIT: Performed by: OCCUPATIONAL THERAPIST

## 2018-02-20 PROCEDURE — 97530 THERAPEUTIC ACTIVITIES: CPT | Mod: GO,XU | Performed by: OCCUPATIONAL THERAPIST

## 2018-02-20 PROCEDURE — 97112 NEUROMUSCULAR REEDUCATION: CPT | Mod: GP,XU | Performed by: PHYSICAL THERAPIST

## 2018-02-20 PROCEDURE — G8979 MOBILITY GOAL STATUS: HCPCS | Mod: GP,CI | Performed by: PHYSICAL THERAPIST

## 2018-02-20 PROCEDURE — 97161 PT EVAL LOW COMPLEX 20 MIN: CPT | Mod: GP | Performed by: PHYSICAL THERAPIST

## 2018-02-20 PROCEDURE — 92507 TX SP LANG VOICE COMM INDIV: CPT | Mod: GN | Performed by: SPEECH-LANGUAGE PATHOLOGIST

## 2018-02-20 PROCEDURE — 40000211 ZZHC STATISTIC SLP  DEPARTMENT VISIT: Performed by: SPEECH-LANGUAGE PATHOLOGIST

## 2018-02-20 NOTE — PROGRESS NOTES
[facility]        OUTPATIENT PHYSICAL THERAPY FUNCTIONAL EVALUATION  PLAN OF TREATMENT FOR OUTPATIENT REHABILITATION  (COMPLETE FOR INITIAL CLAIMS ONLY)  Patient's Last Name, First Name, M.I.  YOB: 1940  AndreaBryson LEWIS     Provider's Name   [facility]   Medical Record No.  [id number]     Start of Care Date:  02/20/18   Onset Date:  02/07/18   Type:     _X__PT   ____OT  ____SLP Medical Diagnosis:  CVA unspecified mechanism     PT Diagnosis:  R side weakness Visits from SOC:  1                              __________________________________________________________________________________  Plan of Treatment/Functional Goals:     strengthening, balance and coordination        GOALS     walk community distance with no difficulty in order to return to work  04/20/18       Independent sit to stand no difficulty;  03/20/18       Independent in HEP to continue strengthening on own  04/20/18       Independent up/down full flight of stairs reciprocal pattern light touch on rails   04/20/18           Therapy Frequency:  2 times/Week (x 2 weeks then decrease to 1x/week)   Predicted Duration of Therapy Intervention:  2 months    Neisha Espinosa, PT, DPT                                    I CERTIFY THE NEED FOR THESE SERVICES FURNISHED UNDER        THIS PLAN OF TREATMENT AND WHILE UNDER MY CARE .             Physician Signature               Date    X_____________________________________________________                      Certification Date From:  02/20/18   Certification Date To:  04/20/18    Referring Provider:  Kristine Fairbanks     Initial Assessment  See Epic Evaluation- Start of Care Date: 02/20/18

## 2018-02-20 NOTE — PROGRESS NOTES
Bryson Mendez  1940 02/20/18 1400   Quick Adds   Quick Adds Certification   Type of Visit Initial OP PT Evaluation   General Information   Start of Care Date 02/20/18   Referring Physician Kristine Fairbanks    Orders Evaluate and Treat as Indicated   Order Date 02/09/18   Medical Diagnosis CVA unspecified mechanism   Onset of illness/injury or Date of Surgery 02/07/18   Precautions/Limitations no known precautions/limitations   Special Instructions balance; resume ROMAN rehab   Pertinent history of current problem (include personal factors and/or comorbidities that impact the POC) 78 yo male s/p ROMAN revision1/19/18 with c/o chest pain post ROMAN, stress test during ROMAN hospitalization recommended cardiology.  Cardiology did angio, pt stroked during angio, was administered TPA but not full course due to concerns of new ROMAN revision (per pt); Initially R UE and LE flaccid and speech jibberish; next day R arm and leg were improved not fully resolved; Continues to improve daily;  PMH:  h/o MI with 4 sents in 2006, and revision of stents in 2007; HTN; diabetes; primary R ROMAN 2014 now ball revised   Pertinent Visual History  NAD; appears frail with movement   Prior level of function comment independent; not using walker after ROMAN revision   Previous/Current Treatment Physical Therapy;Occupational Therapy   Patient role/Employment history Retired; works security at [employer]   [facility]   Home/Community Accessibility Comments 3 steps to enter; with rail; stairs to basement (rarely)   Current Assistive Devices Walker;Standard Cane  (typically does not need)   Assistive Devices Comments not use AD   Patient/Family Goals Statement return to work; improve speech   Fall Risk Screen   Fall screen completed by SLP   Have you fallen 2 or more times in the past year? No   Have you fallen and had an injury in the past year? No   Is patient a fall risk? No   Pain   Patient  currently in pain No   Cognitive Status Examination   Orientation orientation to person, place and time   Level of Consciousness alert   Follows Commands and Answers Questions 100% of the time   Personal Safety and Judgment intact   Observation   Observation NAD   Range of Motion (ROM)   ROM Comment wfl for 78 yo male   Strength   Strength Comments R UE and LE 4+/5   Bed Mobility   Bed Mobility Comments independent   Transfer Skills   Transfer Comments independent; uses UEs sit to stand   Gait   Gait Comments no AD; WFL; self selected pace slightly slow   Balance   Balance Comments tandem walking 14'   Coordination   Coordination Comments unable to do grape vine or cross over stepping due to recent ROMAN   Muscle Tone   Muscle Tone Comments WNL   Planned Therapy Interventions   Planned Therapy Interventions Comment strengthening, balance and coordination   Clinical Impression   Criteria for Skilled Therapeutic Interventions Met yes, treatment indicated   PT Diagnosis R side weakness   Influenced by the following impairments weakness    Functional limitations due to impairments difficulty ambulating distance   Clinical Presentation Stable/Uncomplicated   Clinical Decision Making (Complexity) Low complexity   Therapy Frequency 2 times/Week  (x 2 weeks then decrease to 1x/week)   Predicted Duration of Therapy Intervention (days/wks) 2 months   Risk & Benefits of therapy have been explained Yes   Patient, Family & other staff in agreement with plan of care Yes   Clinical Impression Comments Pt will benefit from skilled intervention to improve functional mobility allowing him to return to previous level of activity   Education Assessment   Preferred Learning Style Listening;Reading;Demonstration;Pictures/video   Barriers to Learning No barriers   GOALS   PT Eval Goals 1;2;3;4   Goal 1   Goal Description walk community distance with no difficulty in order to return to work   Target Date 04/20/18   Goal 2   Goal Description  Independent sit to stand no difficulty;   Target Date 03/20/18   Goal 3   Goal Description Independent in HEP to continue strengthening on own   Target Date 04/20/18   Goal 4   Goal Description Independent up/down full flight of stairs reciprocal pattern light touch on rails    Target Date 04/20/18   Total Evaluation Time   Total Evaluation Time (Minutes) 20   Therapy Certification   Certification date from 02/20/18   Certification date to 04/20/18   Medical Diagnosis CVA unspecified mechanism   Certification I certify the need for these services furnished under this plan of treatment and while under my care.  (Physician co-signature of this document indicates review and certification of the therapy plan).

## 2018-02-20 NOTE — PROGRESS NOTES
Outpatient Physical Therapy Discharge Note     Patient: Bryson Mendez  : 1940    Beginning/End Dates of Reporting Period:  18 to 2018    Referring Provider: Ene Eugene PA-C    Therapy Diagnosis: s/p right hip revision      Client Self Report: Not needing the cane, the leg is feeling strong.  Some burning and stinging down to the ankle.     Objective Measurements:  Objective Measure: Gait  Details: Without assistive device.      Goals:  Goal Identifier     Goal Description Patient will be able to walk >=2 blocks without an assistive device.   Target Date 18   Date Met      Progress:     Goal Identifier     Goal Description Patient will be able to climb stairs with reciprocal step pattern.   Target Date 18   Date Met      Progress:     Goal Identifier     Goal Description Patient will be able to perform right single leg stance for >=15 seconds to indicate improved LE proprioception and strength.   Target Date 18   Date Met      Progress:       Progress Toward Goals:   Progress this reporting period: Fortunato attended 2 PT sessions s/p his right hip revision.  Treatment was interrupted as unfortunately patient experienced a stroke.      Plan:  Discharge from therapy.    Discharge:    Reason for Discharge: Change in medical status.    Equipment Issued: None    Discharge Plan: Patient to continue with physical therapy under neuro clinical specialist Neisha Espinosa PT.      Ruba Escobar, PT, DPT  Doctor of Physical Therapy #[id number]  [facility]  [phone]  [email or web address]

## 2018-02-20 NOTE — ADDENDUM NOTE
Encounter addended by: Ruba Escobar, PT on: 2/20/2018  4:08 PM<BR>     Actions taken: Flowsheet accepted, Sign clinical note, Charge Capture section accepted

## 2018-02-20 NOTE — ADDENDUM NOTE
Encounter addended by: Ruba Escobar, PT on: 2/20/2018  4:08 PM<BR>     Actions taken: Episode resolved

## 2018-02-22 ENCOUNTER — HOSPITAL ENCOUNTER (OUTPATIENT)
Dept: OCCUPATIONAL THERAPY | Facility: CLINIC | Age: 78
Setting detail: THERAPIES SERIES
End: 2018-02-22
Attending: PHYSICAL MEDICINE & REHABILITATION
Payer: MEDICARE

## 2018-02-22 ENCOUNTER — HOSPITAL ENCOUNTER (OUTPATIENT)
Dept: SPEECH THERAPY | Facility: CLINIC | Age: 78
Setting detail: THERAPIES SERIES
End: 2018-02-22
Attending: FAMILY MEDICINE
Payer: MEDICARE

## 2018-02-22 ENCOUNTER — HOSPITAL ENCOUNTER (OUTPATIENT)
Dept: PHYSICAL THERAPY | Facility: CLINIC | Age: 78
Setting detail: THERAPIES SERIES
End: 2018-02-22
Attending: PHYSICAL MEDICINE & REHABILITATION
Payer: MEDICARE

## 2018-02-22 PROCEDURE — 40000125 ZZHC STATISTIC OT OUTPT VISIT: Performed by: OCCUPATIONAL THERAPIST

## 2018-02-22 PROCEDURE — 97110 THERAPEUTIC EXERCISES: CPT | Mod: GP,XU | Performed by: PHYSICAL THERAPIST

## 2018-02-22 PROCEDURE — 97530 THERAPEUTIC ACTIVITIES: CPT | Mod: GO | Performed by: OCCUPATIONAL THERAPIST

## 2018-02-22 PROCEDURE — 40000719 ZZHC STATISTIC PT DEPARTMENT NEURO VISIT: Performed by: PHYSICAL THERAPIST

## 2018-02-22 PROCEDURE — 92507 TX SP LANG VOICE COMM INDIV: CPT | Mod: GN | Performed by: SPEECH-LANGUAGE PATHOLOGIST

## 2018-02-22 PROCEDURE — 40000211 ZZHC STATISTIC SLP  DEPARTMENT VISIT: Performed by: SPEECH-LANGUAGE PATHOLOGIST

## 2018-02-27 ENCOUNTER — HOSPITAL ENCOUNTER (OUTPATIENT)
Dept: SPEECH THERAPY | Facility: CLINIC | Age: 78
Setting detail: THERAPIES SERIES
End: 2018-02-27
Attending: FAMILY MEDICINE
Payer: MEDICARE

## 2018-02-27 ENCOUNTER — HOSPITAL ENCOUNTER (OUTPATIENT)
Dept: OCCUPATIONAL THERAPY | Facility: CLINIC | Age: 78
Setting detail: THERAPIES SERIES
End: 2018-02-27
Attending: PHYSICAL MEDICINE & REHABILITATION
Payer: MEDICARE

## 2018-02-27 PROCEDURE — 97530 THERAPEUTIC ACTIVITIES: CPT | Mod: GO,XU | Performed by: OCCUPATIONAL THERAPIST

## 2018-02-27 PROCEDURE — 40000444 ZZHC STATISTIC OT PEDS VISIT: Performed by: OCCUPATIONAL THERAPIST

## 2018-02-27 PROCEDURE — 92507 TX SP LANG VOICE COMM INDIV: CPT | Mod: GN | Performed by: SPEECH-LANGUAGE PATHOLOGIST

## 2018-02-27 PROCEDURE — 40000211 ZZHC STATISTIC SLP  DEPARTMENT VISIT: Performed by: SPEECH-LANGUAGE PATHOLOGIST

## 2018-02-27 NOTE — ADDENDUM NOTE
Encounter addended by: Sarah Andrade, SLP on: 2/27/2018  8:48 AM<BR>     Actions taken: Flowsheet accepted

## 2018-03-01 ENCOUNTER — HOSPITAL ENCOUNTER (OUTPATIENT)
Dept: OCCUPATIONAL THERAPY | Facility: CLINIC | Age: 78
Setting detail: THERAPIES SERIES
End: 2018-03-01
Attending: PHYSICAL MEDICINE & REHABILITATION
Payer: MEDICARE

## 2018-03-01 ENCOUNTER — HOSPITAL ENCOUNTER (OUTPATIENT)
Dept: SPEECH THERAPY | Facility: CLINIC | Age: 78
Setting detail: THERAPIES SERIES
End: 2018-03-01
Attending: FAMILY MEDICINE
Payer: MEDICARE

## 2018-03-01 ENCOUNTER — HOSPITAL ENCOUNTER (OUTPATIENT)
Dept: PHYSICAL THERAPY | Facility: CLINIC | Age: 78
Setting detail: THERAPIES SERIES
End: 2018-03-01
Attending: PHYSICAL MEDICINE & REHABILITATION
Payer: MEDICARE

## 2018-03-01 PROCEDURE — 92507 TX SP LANG VOICE COMM INDIV: CPT | Mod: GN | Performed by: SPEECH-LANGUAGE PATHOLOGIST

## 2018-03-01 PROCEDURE — 97530 THERAPEUTIC ACTIVITIES: CPT | Mod: GO | Performed by: OCCUPATIONAL THERAPIST

## 2018-03-01 PROCEDURE — 97110 THERAPEUTIC EXERCISES: CPT | Mod: GP | Performed by: PHYSICAL THERAPIST

## 2018-03-01 PROCEDURE — 40000125 ZZHC STATISTIC OT OUTPT VISIT: Performed by: OCCUPATIONAL THERAPIST

## 2018-03-01 PROCEDURE — 40000719 ZZHC STATISTIC PT DEPARTMENT NEURO VISIT: Performed by: PHYSICAL THERAPIST

## 2018-03-01 PROCEDURE — 40000211 ZZHC STATISTIC SLP  DEPARTMENT VISIT: Performed by: SPEECH-LANGUAGE PATHOLOGIST

## 2018-03-06 ENCOUNTER — HOSPITAL ENCOUNTER (OUTPATIENT)
Dept: SPEECH THERAPY | Facility: CLINIC | Age: 78
Setting detail: THERAPIES SERIES
End: 2018-03-06
Attending: FAMILY MEDICINE
Payer: MEDICARE

## 2018-03-06 PROCEDURE — 92507 TX SP LANG VOICE COMM INDIV: CPT | Mod: GN | Performed by: SPEECH-LANGUAGE PATHOLOGIST

## 2018-03-06 PROCEDURE — 40000211 ZZHC STATISTIC SLP  DEPARTMENT VISIT: Performed by: SPEECH-LANGUAGE PATHOLOGIST

## 2018-03-07 ENCOUNTER — HOSPITAL ENCOUNTER (OUTPATIENT)
Dept: PHYSICAL THERAPY | Facility: CLINIC | Age: 78
Setting detail: THERAPIES SERIES
End: 2018-03-07
Attending: PHYSICAL MEDICINE & REHABILITATION
Payer: MEDICARE

## 2018-03-07 PROCEDURE — 97530 THERAPEUTIC ACTIVITIES: CPT | Mod: GP | Performed by: PHYSICAL THERAPIST

## 2018-03-07 PROCEDURE — 40000719 ZZHC STATISTIC PT DEPARTMENT NEURO VISIT: Performed by: PHYSICAL THERAPIST

## 2018-03-07 PROCEDURE — 97110 THERAPEUTIC EXERCISES: CPT | Mod: GP | Performed by: PHYSICAL THERAPIST

## 2018-03-08 ENCOUNTER — HOSPITAL ENCOUNTER (OUTPATIENT)
Dept: SPEECH THERAPY | Facility: CLINIC | Age: 78
Setting detail: THERAPIES SERIES
End: 2018-03-08
Attending: FAMILY MEDICINE
Payer: MEDICARE

## 2018-03-08 PROCEDURE — 92507 TX SP LANG VOICE COMM INDIV: CPT | Mod: GN | Performed by: SPEECH-LANGUAGE PATHOLOGIST

## 2018-03-08 PROCEDURE — 40000211 ZZHC STATISTIC SLP  DEPARTMENT VISIT: Performed by: SPEECH-LANGUAGE PATHOLOGIST

## 2018-03-09 ENCOUNTER — HOSPITAL ENCOUNTER (OUTPATIENT)
Dept: SPEECH THERAPY | Facility: CLINIC | Age: 78
Setting detail: THERAPIES SERIES
End: 2018-03-09
Attending: FAMILY MEDICINE
Payer: MEDICARE

## 2018-03-09 ENCOUNTER — HOSPITAL ENCOUNTER (OUTPATIENT)
Dept: PHYSICAL THERAPY | Facility: CLINIC | Age: 78
Setting detail: THERAPIES SERIES
End: 2018-03-09
Attending: PHYSICAL MEDICINE & REHABILITATION
Payer: MEDICARE

## 2018-03-09 ENCOUNTER — HOSPITAL ENCOUNTER (OUTPATIENT)
Dept: GENERAL RADIOLOGY | Facility: CLINIC | Age: 78
Discharge: HOME | End: 2018-03-09
Attending: FAMILY MEDICINE | Admitting: FAMILY MEDICINE
Payer: MEDICARE

## 2018-03-09 ENCOUNTER — HOSPITAL ENCOUNTER (OUTPATIENT)
Dept: OCCUPATIONAL THERAPY | Facility: CLINIC | Age: 78
Setting detail: THERAPIES SERIES
End: 2018-03-09
Attending: PHYSICAL MEDICINE & REHABILITATION
Payer: MEDICARE

## 2018-03-09 DIAGNOSIS — R13.10 DYSPHAGIA, UNSPECIFIED TYPE: ICD-10-CM

## 2018-03-09 PROCEDURE — G8998 SWALLOW D/C STATUS: HCPCS | Mod: GN,CI | Performed by: SPEECH-LANGUAGE PATHOLOGIST

## 2018-03-09 PROCEDURE — G8997 SWALLOW GOAL STATUS: HCPCS | Mod: GN,CI | Performed by: SPEECH-LANGUAGE PATHOLOGIST

## 2018-03-09 PROCEDURE — 40000125 ZZHC STATISTIC OT OUTPT VISIT: Performed by: OCCUPATIONAL THERAPIST

## 2018-03-09 PROCEDURE — 40000211 ZZHC STATISTIC SLP  DEPARTMENT VISIT: Performed by: SPEECH-LANGUAGE PATHOLOGIST

## 2018-03-09 PROCEDURE — 97530 THERAPEUTIC ACTIVITIES: CPT | Mod: GP | Performed by: PHYSICAL THERAPIST

## 2018-03-09 PROCEDURE — 97530 THERAPEUTIC ACTIVITIES: CPT | Mod: GO | Performed by: OCCUPATIONAL THERAPIST

## 2018-03-09 PROCEDURE — 97110 THERAPEUTIC EXERCISES: CPT | Mod: GP | Performed by: PHYSICAL THERAPIST

## 2018-03-09 PROCEDURE — 74230 X-RAY XM SWLNG FUNCJ C+: CPT

## 2018-03-09 PROCEDURE — G8996 SWALLOW CURRENT STATUS: HCPCS | Mod: GN,CI | Performed by: SPEECH-LANGUAGE PATHOLOGIST

## 2018-03-09 PROCEDURE — 40000719 ZZHC STATISTIC PT DEPARTMENT NEURO VISIT: Performed by: PHYSICAL THERAPIST

## 2018-03-09 PROCEDURE — 92611 MOTION FLUOROSCOPY/SWALLOW: CPT | Mod: GN | Performed by: SPEECH-LANGUAGE PATHOLOGIST

## 2018-03-09 RX ORDER — BARIUM SULFATE 400 MG/ML
50 SUSPENSION ORAL ONCE
Status: COMPLETED | OUTPATIENT
Start: 2018-03-09 | End: 2018-03-09

## 2018-03-09 RX ADMIN — BARIUM SULFATE 30 ML: 400 SUSPENSION ORAL at 08:47

## 2018-03-09 RX ADMIN — BARIUM SULFATE 30 ML: 400 SUSPENSION ORAL at 08:48

## 2018-03-09 NOTE — PROGRESS NOTES
Impression: Oral and pharyngeal stage of swallow are WFL.  Pt has minimal post swallow residue in vallaculae with honey and pudding consistencies which clear with an independent dry swallow.  Transient penetration noted x1 with thin liquid.   No aspiration present.  No treatment recommendations indicated.  Dysphagia symptoms may be related to esophageal stage.         Video Swallow Study  03/09/18   General Information   Type Of Visit Initial   Start Of Care Date 03/09/18   Referring Physician Thien Davison MD   Orders Evaluate And Treat   Orders Comment video swallow study   Medical Diagnosis Dysphagia   Onset Of Illness/injury Or Date Of Surgery 03/01/18  (order date)   Precautions/limitations No Known Precautions/limitations   Hearing WFL for exam   Pertinent History of Current Problem/OT: Additional Occupational Profile Info Pt with PMH CVA who reports pharyngeal symptoms during swallow but also states has constant feeling of something sticking in throat not related to meals.  Chief complaint is discomfort/annoyanace from globus.  Pt does report GERD history on on medications for that.   Respiratory Status Room air   Prior Level Of Function Comment eats a regular diet consistency.   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   Pain Assessment   Pain Reported No   Fall Risk Screen   Fall screen completed by SLP   Have you fallen 2 or more times in the past year? No   Have you fallen and had an injury in the past year? No   Is patient a fall risk? No   Clinical Swallow Evaluation   Oral Musculature generally intact   Structural Abnormalities none present   Dentition present and adequate   Mucosal Quality good   Mandibular Strength and Mobility intact   Oral Labial Strength and Mobility WFL   Lingual Strength and Mobility WFL   Buccal Strength and Mobility intact   Laryngeal Function  Cough;Throat clear;Swallow;Voicing initiated   VFSS Evaluation   VFSS Additional Documentation Yes   VFSS Eval: Radiology   Radiologist Dr. Mike   Views Taken left lateral;A/P   Physical Location of Procedure [facility]   VFSS Eval: Thin Liquid Texture Trial   Mode of Presentation, Thin Liquid cup;self-fed   Order of Presentation 1   Preparatory Phase WFL   Oral Phase, Thin Liquid WFL   Pharyngeal Phase, Thin Liquid WFL   Rosenbek's Penetration Aspiration Scale: Thin Liquid Trial Results 2 - contrast enters airway, remains above the vocal cords, no residue remains (penetration)   Diagnostic Statement WFL   VFSS Eval: Nectar Thick Liquid Texture Trial   Mode of Presentation, Nectar cup   Order of Presentation 2   Preparatory Phase WFL   Oral Phase, Nectar WFL   Pharyngeal Phase, [place] WFL   Rosenbek's Penetration Aspiration Scale: Nectar-Thick Liquid Trial Results 1 - no aspiration, contrast does not enter airway   Diagnostic Statement WFL   VFSS Eval: Honey Thick Texture Trial   Mode of Presentation, Honey cup;self-fed   Order of Presentation 3   Preparatory Phase WFL   Oral Phase, Honey WFL   Pharyngeal Phase, Honey WFL;Delayed swallow reflex;Residue in valleculae  (mild)   Rosenbek's Penetration Aspiration Scale: Honey Trial Results 1 - no aspiration, contrast does not enter airway   Diagnostic Statement WFL   VFSS Eval: Pudding Thick Liquid Texture Trial   Mode of Presentation, Pudding spoon;self-fed   Order of Presentation 4   Preparatory Phase WFL   Oral Phase, Pudding WFL   Pharyngeal Phase, Pudding WFL;Residue in valleculae;Residue in pyriform sinus  (mild)   Rosenbek's Penetration Aspiration Scale: Pudding-Thick Liquid Trial Results 1 - no aspiration, contrast does not enter airway   Diagnostic Statement WFL.  Residue clears with independent dry swallow.   VFSS Eval: Solid Food Texture Trial   Mode of Presentation, Solid self-fed   Order of Presentation 5,6   Preparatory Phase WFL   Oral  Phase, Solid WFL   Pharyngeal Phase, Solid WFL   Rosenbek's Penetration Aspiration Scale: Solid Food Trial Results 1 - no aspiration, contrast does not enter airway   Diagnostic Statement WFL   Swallow Compensations   Swallow Compensations Multiple swallow   Educational Assessment   Barriers to Learning No barriers   Preferred Learning Style Listening   Esophageal Phase of Swallow   Patient reports or presents with symptoms of esophageal dysphagia Yes   Esophageal sweep performed during today s vidofluoroscopic exam  No;Please refer to radiologist's report for details   Swallow Eval: Clinical Impressions   Skilled Criteria for Therapy Intervention No problems identified which require skilled intervention   Functional Assessment Scale (FAS) 6   Dysphagia Outcome Severity Scale (GROVER) Level 6 - GROVER   Diet texture recommendations Regular diet   Clinical Impression Comments Oral and pharyngeal stage of swallow are WFL.  Pt has minimal post swallow residue in vallaculae with honey and pudding consistencies which clear with an independent dry swallow.  Transient penetration noted x1 with thin liquid.   No aspiration present.  No treatment recommendations indicated.  Dysphagia symptoms may be related to esophageal stage.     Total Session Time   Total Session Time 25   Total Evaluation Time 25   SLP Medicare Only G-code   G-code Swallowing   Swallowing   Swallowing:  Current Status , Goal , Discharge -Vbws Only-Modifier the same for all G-codes CI: 1-19% impairment   Swallowing: Current  & Discharge Modifier Rationale-Eval Only outcome measure tool level 6   Swallowing Comments regular diet

## 2018-03-12 ENCOUNTER — HOSPITAL ENCOUNTER (OUTPATIENT)
Dept: OCCUPATIONAL THERAPY | Facility: CLINIC | Age: 78
Setting detail: THERAPIES SERIES
End: 2018-03-12
Attending: PHYSICAL MEDICINE & REHABILITATION
Payer: MEDICARE

## 2018-03-12 PROCEDURE — 97530 THERAPEUTIC ACTIVITIES: CPT | Mod: GO | Performed by: OCCUPATIONAL THERAPIST

## 2018-03-12 PROCEDURE — 40000444 ZZHC STATISTIC OT PEDS VISIT: Performed by: OCCUPATIONAL THERAPIST

## 2018-03-12 PROCEDURE — G8990 OTHER PT/OT CURRENT STATUS: HCPCS | Mod: GO,CJ | Performed by: OCCUPATIONAL THERAPIST

## 2018-03-12 PROCEDURE — G8991 OTHER PT/OT GOAL STATUS: HCPCS | Mod: GO,CI | Performed by: OCCUPATIONAL THERAPIST

## 2018-03-13 ENCOUNTER — HOSPITAL ENCOUNTER (OUTPATIENT)
Dept: PHYSICAL THERAPY | Facility: CLINIC | Age: 78
Setting detail: THERAPIES SERIES
End: 2018-03-13
Attending: PHYSICAL MEDICINE & REHABILITATION
Payer: MEDICARE

## 2018-03-13 PROCEDURE — 97110 THERAPEUTIC EXERCISES: CPT | Mod: GP | Performed by: PHYSICAL THERAPIST

## 2018-03-13 PROCEDURE — 97530 THERAPEUTIC ACTIVITIES: CPT | Mod: GP | Performed by: PHYSICAL THERAPIST

## 2018-03-13 PROCEDURE — 40000719 ZZHC STATISTIC PT DEPARTMENT NEURO VISIT: Performed by: PHYSICAL THERAPIST

## 2018-03-13 NOTE — ADDENDUM NOTE
Encounter addended by: Noel Isaac, OT on: 3/13/2018  2:15 PM<BR>     Actions taken: Flowsheet accepted

## 2018-03-13 NOTE — PROGRESS NOTES
Outpatient Occupational Therapy Progress Note     Patient: Bryson Mendez  : 1940    Beginning/End Dates of Reporting Period:  2018 to 3/12/2018    Referring Provider: Dr. Kristine Jean-Baptiste    Therapy Diagnosis: Impaired cognition affecting IADL's    Client Self Report: Romeo reports he has been working on his months of the year backwards.  He states that he has a harder time in the morning.  His wife reports that he is having some difficulty with what day it is, what appointments he has had.  He now has a sign on his cpap mask to take his medications.  He has been seen x 8 visits    Objective Measurements:  Ambulation:  Independent, no device needed  Fine Motor Skills:  Functional and adequate  Dynavision:  See daily notes for scores.        Goals:     Goal Identifier Education and Home Programming   Goal Description Client and caregiver will verbalize an understanding of the findings on the assessment and home programming with each visit   Target Date 04/15/18   Date Met      Progress:  Progressing:  Client to be participating in compensatory strategies to manage IADL barriers such as med management, reading skills, activities at home     Goal Identifier Medication Management   Goal Description Client will demonstrate independence with medication management on a daily basis   Target Date 03/15/18   Date Met      Progress:  Progressing, strategies such as an alarm on phone, set up of medications on table, note in bathroom and on cpap machine are being utilized, as well as cuing from wife     Goal Identifier Finance Management   Goal Description Client will demonstrate the ability to complete bill/account/money management skill task in 2 consecutive sessions in prep of carry over to home   Target Date 03/15/18   Date Met      Progress:  Further assessment of this skill to be completed     Goal Identifier Driving   Goal Description Client will particpate and complete predriving screen with adequate  scoring in prep of returning to driving   Target Date 04/14/18   Date Met      Progress:  Current use of the dynavision Mode A, Mode B (divided attention) being utilized.  See daily note for scores.  Most recent scoring of the SDMT (cognitive portion) is -3.0 SD below the norm.  Still a risk to drive.       Progress Toward Goals:   Progress this reporting period: as noted above.  Overall Bryson continues to have difficulty with word use at times.  When having to mult-task in session such as go between to tasks then answer a question, this tends to lead to utilization of wrong words and changing thought processes in mid stream.  Initiation of tasks at home continue to be a struggle, he tends to wait for his wife to tell him what to do.      Plan:  Continue therapy per current plan of care.  Plan to see 2x week x 4 more weeks.  Focus on pre-driving skills, IADL's specifically related to money mangement tasks, multi-tasking.    Discharge:  No

## 2018-03-16 ENCOUNTER — HOSPITAL ENCOUNTER (OUTPATIENT)
Dept: SPEECH THERAPY | Facility: CLINIC | Age: 78
Setting detail: THERAPIES SERIES
End: 2018-03-16
Attending: FAMILY MEDICINE
Payer: MEDICARE

## 2018-03-16 ENCOUNTER — HOSPITAL ENCOUNTER (OUTPATIENT)
Dept: OCCUPATIONAL THERAPY | Facility: CLINIC | Age: 78
Setting detail: THERAPIES SERIES
End: 2018-03-16
Attending: PHYSICAL MEDICINE & REHABILITATION
Payer: MEDICARE

## 2018-03-16 PROCEDURE — 40000218 ZZH STATISTIC SLP PEDS DEPT VISIT

## 2018-03-16 PROCEDURE — 40000125 ZZHC STATISTIC OT OUTPT VISIT: Performed by: OCCUPATIONAL THERAPIST

## 2018-03-16 PROCEDURE — 92507 TX SP LANG VOICE COMM INDIV: CPT | Mod: GN

## 2018-03-16 PROCEDURE — 97530 THERAPEUTIC ACTIVITIES: CPT | Mod: GO,XU | Performed by: OCCUPATIONAL THERAPIST

## 2018-03-16 PROCEDURE — 40000211 ZZHC STATISTIC SLP  DEPARTMENT VISIT

## 2018-03-20 ENCOUNTER — HOSPITAL ENCOUNTER (OUTPATIENT)
Dept: PHYSICAL THERAPY | Facility: CLINIC | Age: 78
Setting detail: THERAPIES SERIES
End: 2018-03-20
Attending: PHYSICAL MEDICINE & REHABILITATION
Payer: MEDICARE

## 2018-03-20 ENCOUNTER — HOSPITAL ENCOUNTER (OUTPATIENT)
Dept: OCCUPATIONAL THERAPY | Facility: CLINIC | Age: 78
Setting detail: THERAPIES SERIES
End: 2018-03-20
Attending: PHYSICAL MEDICINE & REHABILITATION
Payer: MEDICARE

## 2018-03-20 PROCEDURE — 97110 THERAPEUTIC EXERCISES: CPT | Mod: GP | Performed by: PHYSICAL THERAPIST

## 2018-03-20 PROCEDURE — 40000719 ZZHC STATISTIC PT DEPARTMENT NEURO VISIT: Performed by: PHYSICAL THERAPIST

## 2018-03-20 PROCEDURE — 40000444 ZZHC STATISTIC OT PEDS VISIT: Performed by: OCCUPATIONAL THERAPIST

## 2018-03-20 PROCEDURE — G8979 MOBILITY GOAL STATUS: HCPCS | Mod: GP,CI | Performed by: PHYSICAL THERAPIST

## 2018-03-20 PROCEDURE — 97530 THERAPEUTIC ACTIVITIES: CPT | Mod: GP | Performed by: PHYSICAL THERAPIST

## 2018-03-20 PROCEDURE — G8978 MOBILITY CURRENT STATUS: HCPCS | Mod: GP,CL | Performed by: PHYSICAL THERAPIST

## 2018-03-20 PROCEDURE — 97530 THERAPEUTIC ACTIVITIES: CPT | Mod: GO | Performed by: OCCUPATIONAL THERAPIST

## 2018-03-20 NOTE — PROGRESS NOTES
Bryson Mendez  1940  Kristine Amezquita MD  [facility]  [address]  [place] [address]  Diagnosis:  R hemiparesis following  CVA; S/p ROMAN Physical Therapy Progress Note  03/20/18 1100   Signing Clinician's Name / Credentials   Signing clinician's name / credentials Neisha Espinosa PT, DPT   Session Number   Session Number 8 [employer]  SOC  2/20/2018   Progress Note/Recertification   Progress Note Due Date 03/20/18   Progress Note Completed Date 03/20/18   Recertification Due Date 04/24/18   Mobility: Walking & Moving Around   Mobility Current Status,  (eval/re-eval & every progress note) CL: 60-79% impairment   Current Mobility Modifier Rationale balance reactions   Mobility Goal,  (eval/re-eval, every progress note, & discharge) CI: 1-19% impairment   Adult Goals   PT Eval Goals 1;2;3;4   Goal 1   Goal Description walk community distance with no difficulty in order to return to work   Target Date 04/20/18   Date Met 03/20/18   Goal 2   Goal Description Independent sit to stand no difficulty;  (slower then typical 3/20/18)   Target Date 03/20/18   Date Met 03/07/18   Goal 3   Goal Description Independent in HEP to continue strengthening on own  (continues to require guidance;)   Target Date 04/20/18   Goal 4   Goal Description Independent up/down full flight of stairs reciprocal pattern light touch on rails   (moderate reliance on L rail, mild reliance on R edge NO rail)   Target Date 04/20/18   Subjective Report   Subjective Report balance continues to be challenge   Therapeutic Procedure/exercise   Minutes 50   Skilled Intervention education and Exercise instruction with manual facilitation, tactile and verbal cueing to improve strength and functional mobility   Patient Response step up not a challenge; frequently going too fast as is able to do 25 reps without too much trouble   Treatment Detail instructed in lunge many cues to go SLOWLY;  and prone quad  stretch; NuStep wk ld 5 x 7 min, wkld 1 x 3 min Omni level 5   Progress progression of strengthening and stretching   Therapeutic Activity   Minutes 10   Skilled Intervention high level balance and coordination activities to improve balance and functional mobility   Patient Response challenge with head turn to L>R; struggled to hold BOSU still when  flat side up; felt easier   Treatment Detail BOSU  dome up and flat side up; mod assist for balance able to balance independently briefly 3-5 seconds    Progress overall improved performance on BOSU   Plan   Home program above modifications   Updates to plan of care 1x/week   Plan for next session balance, strength; NuStep for endurance   Comments   Comments L hip discomfort limits time on NuStep    Total Session Time   Timed Code Treatment Minutes 60   Total Treatment Time (sum of timed and untimed services) 60

## 2018-03-22 ENCOUNTER — HOSPITAL ENCOUNTER (OUTPATIENT)
Dept: SPEECH THERAPY | Facility: CLINIC | Age: 78
Setting detail: THERAPIES SERIES
End: 2018-03-22
Attending: PHYSICAL MEDICINE & REHABILITATION
Payer: MEDICARE

## 2018-03-22 ENCOUNTER — HOSPITAL ENCOUNTER (OUTPATIENT)
Dept: OCCUPATIONAL THERAPY | Facility: CLINIC | Age: 78
Setting detail: THERAPIES SERIES
End: 2018-03-22
Attending: PHYSICAL MEDICINE & REHABILITATION
Payer: MEDICARE

## 2018-03-22 PROCEDURE — G9163 LANG EXPRESS GOAL STATUS: HCPCS | Mod: GN,CI | Performed by: SPEECH-LANGUAGE PATHOLOGIST

## 2018-03-22 PROCEDURE — 92507 TX SP LANG VOICE COMM INDIV: CPT | Mod: GN | Performed by: SPEECH-LANGUAGE PATHOLOGIST

## 2018-03-22 PROCEDURE — G9162 LANG EXPRESS CURRENT STATUS: HCPCS | Mod: GN,CJ | Performed by: SPEECH-LANGUAGE PATHOLOGIST

## 2018-03-22 PROCEDURE — 40000125 ZZHC STATISTIC OT OUTPT VISIT: Performed by: OCCUPATIONAL THERAPIST

## 2018-03-22 PROCEDURE — 40000211 ZZHC STATISTIC SLP  DEPARTMENT VISIT: Performed by: SPEECH-LANGUAGE PATHOLOGIST

## 2018-03-22 PROCEDURE — 97530 THERAPEUTIC ACTIVITIES: CPT | Mod: GO,XU | Performed by: OCCUPATIONAL THERAPIST

## 2018-03-27 ENCOUNTER — HOSPITAL ENCOUNTER (OUTPATIENT)
Dept: OCCUPATIONAL THERAPY | Facility: CLINIC | Age: 78
Setting detail: THERAPIES SERIES
End: 2018-03-27
Attending: PHYSICAL MEDICINE & REHABILITATION
Payer: MEDICARE

## 2018-03-27 ENCOUNTER — HOSPITAL ENCOUNTER (OUTPATIENT)
Dept: SPEECH THERAPY | Facility: CLINIC | Age: 78
Setting detail: THERAPIES SERIES
End: 2018-03-27
Attending: FAMILY MEDICINE
Payer: MEDICARE

## 2018-03-27 PROCEDURE — G9164 LANG EXPRESS D/C STATUS: HCPCS | Mod: GN,CJ | Performed by: SPEECH-LANGUAGE PATHOLOGIST

## 2018-03-27 PROCEDURE — G9163 LANG EXPRESS GOAL STATUS: HCPCS | Mod: GN,CI | Performed by: SPEECH-LANGUAGE PATHOLOGIST

## 2018-03-27 PROCEDURE — 40000125 ZZHC STATISTIC OT OUTPT VISIT: Performed by: OCCUPATIONAL THERAPIST

## 2018-03-27 PROCEDURE — 92507 TX SP LANG VOICE COMM INDIV: CPT | Mod: GN | Performed by: SPEECH-LANGUAGE PATHOLOGIST

## 2018-03-27 PROCEDURE — 40000211 ZZHC STATISTIC SLP  DEPARTMENT VISIT: Performed by: SPEECH-LANGUAGE PATHOLOGIST

## 2018-03-27 PROCEDURE — 97530 THERAPEUTIC ACTIVITIES: CPT | Mod: GO,XU | Performed by: OCCUPATIONAL THERAPIST

## 2018-03-28 NOTE — PROGRESS NOTES
Outpatient Speech Language Pathology Progress Note     Patient: Bryson Mendez  : 1940    Beginning/End Dates of Reporting Period:  18 to 3/28/2018    Referring Provider: Thien Davison MD    Therapy Diagnosis: Expressive Aphasia    Client Self Report: The patient reports he feels word finding abilities have improved but still notices difficulties.               Goals:  Goal Identifier verbal expression   Goal Description Pt will name 10 members from a concrete category 85% of the time to increase word finding skills.   Target Date 18   Date Met   (Animals. 8 words in 60 seconds. 4 words in following 30 seco)   Progress: partially met     Goal Identifier verbal expression   Goal Description Pt will perform naming tasks with 85% acc (ie opposites, analogies, confrontational naming)   Target Date 18   Date Met   (same & opposite naming. 100% 1/10 difficulty. > 5 minutes )   Progress: Goal met     Goal Identifier auditory comprehension   Goal Description Pt will answer questions after paragraph with 85% acc   Target Date 18   Date Met      Progress: Goal met.  Deficits mainly due to auditory memory vs comprehension     Goal Identifier writing   Goal Description Pt will write short paragraph for functional writing of thank you note, work report with 85% acc   Target Date 18   Date Met   (Word level. No errors.  )   Progress: goal met     Goal Identifier verbal expression   Goal Description Pt will tell a mod-complex story with beginning, middle, and end given a topic with 80% acc   Target Date 18   Date Met      Progress:  Goal Met       Progress Toward Goals:    Progress this reporting period: Pt has met 4 of 5 STG.  Plan to continue for up to 2 weeks for word finding strategies for goal #1    Plan:  Continue therapy per current plan of care.    Discharge:  No

## 2018-03-28 NOTE — ADDENDUM NOTE
Encounter addended by: Sarah Andrade, SLP on: 3/28/2018  1:21 PM<BR>     Actions taken: Flowsheet accepted, Charge Capture section accepted

## 2018-03-28 NOTE — ADDENDUM NOTE
Encounter addended by: Sarah Andrade, JENNA on: 3/28/2018  1:17 PM<BR>     Actions taken: Sign clinical note

## 2018-03-29 ENCOUNTER — HOSPITAL ENCOUNTER (OUTPATIENT)
Dept: OCCUPATIONAL THERAPY | Facility: CLINIC | Age: 78
Setting detail: THERAPIES SERIES
End: 2018-03-29
Attending: PHYSICAL MEDICINE & REHABILITATION
Payer: MEDICARE

## 2018-03-29 PROCEDURE — 40000125 ZZHC STATISTIC OT OUTPT VISIT: Performed by: OCCUPATIONAL THERAPIST

## 2018-03-29 PROCEDURE — 97530 THERAPEUTIC ACTIVITIES: CPT | Mod: GO | Performed by: OCCUPATIONAL THERAPIST

## 2018-04-04 ENCOUNTER — HOSPITAL ENCOUNTER (OUTPATIENT)
Dept: PHYSICAL THERAPY | Facility: CLINIC | Age: 78
Setting detail: THERAPIES SERIES
End: 2018-04-04
Attending: PHYSICAL MEDICINE & REHABILITATION
Payer: MEDICARE

## 2018-04-04 PROCEDURE — 97110 THERAPEUTIC EXERCISES: CPT | Mod: GP | Performed by: PHYSICAL THERAPIST

## 2018-04-04 PROCEDURE — 40000719 ZZHC STATISTIC PT DEPARTMENT NEURO VISIT: Performed by: PHYSICAL THERAPIST

## 2018-04-05 ENCOUNTER — HOSPITAL ENCOUNTER (OUTPATIENT)
Dept: OCCUPATIONAL THERAPY | Facility: CLINIC | Age: 78
Setting detail: THERAPIES SERIES
End: 2018-04-05
Attending: PHYSICAL MEDICINE & REHABILITATION
Payer: MEDICARE

## 2018-04-05 PROCEDURE — 97530 THERAPEUTIC ACTIVITIES: CPT | Mod: GO | Performed by: OCCUPATIONAL THERAPIST

## 2018-04-05 PROCEDURE — 40000125 ZZHC STATISTIC OT OUTPT VISIT: Performed by: OCCUPATIONAL THERAPIST

## 2018-04-09 ENCOUNTER — HOSPITAL ENCOUNTER (OUTPATIENT)
Dept: OCCUPATIONAL THERAPY | Facility: CLINIC | Age: 78
Setting detail: THERAPIES SERIES
End: 2018-04-09
Attending: PHYSICAL MEDICINE & REHABILITATION
Payer: MEDICARE

## 2018-04-09 PROCEDURE — 97530 THERAPEUTIC ACTIVITIES: CPT | Mod: GO | Performed by: OCCUPATIONAL THERAPIST

## 2018-04-09 PROCEDURE — 40000125 ZZHC STATISTIC OT OUTPT VISIT: Performed by: OCCUPATIONAL THERAPIST

## 2018-04-10 ENCOUNTER — HOSPITAL ENCOUNTER (OUTPATIENT)
Dept: PHYSICAL THERAPY | Facility: CLINIC | Age: 78
Setting detail: THERAPIES SERIES
End: 2018-04-10
Attending: PHYSICAL MEDICINE & REHABILITATION
Payer: MEDICARE

## 2018-04-10 PROCEDURE — 97110 THERAPEUTIC EXERCISES: CPT | Mod: GP,KX | Performed by: PHYSICAL THERAPIST

## 2018-04-10 PROCEDURE — 40000719 ZZHC STATISTIC PT DEPARTMENT NEURO VISIT: Performed by: PHYSICAL THERAPIST

## 2018-04-12 ENCOUNTER — HOSPITAL ENCOUNTER (OUTPATIENT)
Dept: OCCUPATIONAL THERAPY | Facility: CLINIC | Age: 78
Setting detail: THERAPIES SERIES
End: 2018-04-12
Attending: PHYSICAL MEDICINE & REHABILITATION
Payer: MEDICARE

## 2018-04-12 PROCEDURE — 97530 THERAPEUTIC ACTIVITIES: CPT | Mod: GO | Performed by: OCCUPATIONAL THERAPIST

## 2018-04-12 PROCEDURE — G8992 OTHER PT/OT  D/C STATUS: HCPCS | Mod: GO,CJ | Performed by: OCCUPATIONAL THERAPIST

## 2018-04-12 PROCEDURE — 40000125 ZZHC STATISTIC OT OUTPT VISIT: Performed by: OCCUPATIONAL THERAPIST

## 2018-04-12 PROCEDURE — G8991 OTHER PT/OT GOAL STATUS: HCPCS | Mod: GO,CI | Performed by: OCCUPATIONAL THERAPIST

## 2018-04-12 NOTE — PROGRESS NOTES
Outpatient Occupational Therapy Discharge Note     Patient: Bryson Mendez  : 1940    Beginning/End Dates of Reporting Period:  3/12/2018 to 2018    Referring Provider: Kristine Amezquita MD    Therapy Diagnosis: Cognitive Impairment secondary to stroke    Client Self Report: Romeo does a great deal of talking about his emotional status.  Discussed the option of taking a driving assessment, asking for rides and public transportation.  He has been seen x 16 visits    Objective Measurements:   Dynavision:  Mode A:  Consistently reaching 52pts  Mode B:  41 pts with 1 missed number  Wii big brain:  Visual attention and memory:  Ranging scores on easy level from 20-90% (inconsistent with day to day)  SDMT:  24 completed in 90 seconds placing him -2.0SD below the norm      Goals:     Goal Identifier Education and Home Programming   Goal Description Client and caregiver will verbalize an understanding of the findings on the assessment and home programming with each visit   Target Date 04/15/18   Date Met   2018   Progress:  Home programming with each session was provided:  This included:  Puzzles, card games, reading, mazes etc.     Goal Identifier Medication Management   Goal Description Client will demonstrate independence with medication management on a daily basis   Target Date 03/15/18   Date Met      Progress:  Overall he continues to have times when he forgets to take medications even with strategies such as signs, pill box, alarms.  His wife helps him to remember.      Goal Identifier Finance Management   Goal Description Client will demonstrate the ability to complete bill/account/money management skill task in 2 consecutive sessions in prep of carry over to home   Target Date 03/15/18   Date Met      Progress:  Actively participating with money management.  Overall his wife will continue to assist with the observation of success     Goal Identifier Driving   Goal Description Client will  particpate and complete predriving screen with adequate scoring in prep of returning to driving   Target Date 04/14/18   Date Met      Progress:  Jeannie Bryson is reaching the portions of the screen of ROM, visual scanning, reaction time.  He however is not met the screen portions of cognition.       Progress Toward Goals:   Progress:  See above    Plan:  Discharge from therapy.    Discharge:    Reason for Discharge: No further expectation of progress/ plateau in status    Equipment Issued: n/a    Discharge Plan: Patient to continue home program.

## 2018-04-17 ENCOUNTER — HOSPITAL ENCOUNTER (OUTPATIENT)
Dept: PHYSICAL THERAPY | Facility: CLINIC | Age: 78
Setting detail: THERAPIES SERIES
End: 2018-04-17
Attending: PHYSICAL MEDICINE & REHABILITATION
Payer: MEDICARE

## 2018-04-17 PROCEDURE — G8979 MOBILITY GOAL STATUS: HCPCS | Mod: GP,CI | Performed by: PHYSICAL THERAPIST

## 2018-04-17 PROCEDURE — 40000719 ZZHC STATISTIC PT DEPARTMENT NEURO VISIT: Performed by: PHYSICAL THERAPIST

## 2018-04-17 PROCEDURE — 97110 THERAPEUTIC EXERCISES: CPT | Mod: GP,KX | Performed by: PHYSICAL THERAPIST

## 2018-04-17 PROCEDURE — G8980 MOBILITY D/C STATUS: HCPCS | Mod: GP,CJ | Performed by: PHYSICAL THERAPIST

## 2018-04-17 NOTE — PROGRESS NOTES
Outpatient Physical Therapy Discharge Note     Patient: Bryson Mendez  : 1940    Beginning/End Dates of Reporting Period:  2018 to 2018  11 visits total    Referring Provider: Kristine Amezquita MD    Therapy Diagnosis: R hemiparesis following CVA 2018     Client Self Report: no trouble with HEP; wife Toya reports he is doing exercercises    Objective Measurements:  Independent in ambulation at least 20' sustained    Goals:  Goal Identifier     Goal Description walk community distance with no difficulty in order to return to work   Target Date 18   Date Met  18   Progress:     Goal Identifier     Goal Description Independent sit to stand no difficulty; (slower then typical 3/20/18)   Target Date 18   Date Met  18   Progress:     Goal Identifier     Goal Description Independent in HEP to continue strengthening on own (cues to read and look at HEP)   Target Date 18   Date Met      Progress:     Goal Identifier     Goal Description Independent up/down full flight of stairs reciprocal pattern light touch on rails  (rail due to R knee pain )   Target Date 18   Date Met  18   Progress:         Progress Toward Goals:  Safe and independent in ambulation and mobility: has met 3 of 4 goals is unable to be independent in HEP due to cognitive impairments following CVA    Plan:  Discharge from therapy.    Discharge:  yes    Reason for Discharge: No further expectation of progress.    Equipment Issued: none    Discharge Plan: Patient to continue home program.    Neisha Espinosa PT, DPT  #[id number]    [facility]  [address] [place] [address]  [phone]

## 2018-04-25 ENCOUNTER — APPOINTMENT (OUTPATIENT)
Dept: GENERAL RADIOLOGY | Facility: CLINIC | Age: 78
End: 2018-04-25
Attending: FAMILY MEDICINE
Payer: MEDICARE

## 2018-04-25 ENCOUNTER — ANESTHESIA EVENT (OUTPATIENT)
Dept: EMERGENCY MEDICINE | Facility: CLINIC | Age: 78
End: 2018-04-25
Payer: MEDICARE

## 2018-04-25 ENCOUNTER — APPOINTMENT (OUTPATIENT)
Dept: CT IMAGING | Facility: CLINIC | Age: 78
End: 2018-04-25
Attending: PHYSICIAN ASSISTANT
Payer: MEDICARE

## 2018-04-25 ENCOUNTER — HOSPITAL ENCOUNTER (OUTPATIENT)
Facility: CLINIC | Age: 78
Setting detail: OBSERVATION
Discharge: HOME | End: 2018-04-26
Attending: FAMILY MEDICINE | Admitting: FAMILY MEDICINE
Payer: MEDICARE

## 2018-04-25 ENCOUNTER — ANESTHESIA (OUTPATIENT)
Dept: EMERGENCY MEDICINE | Facility: CLINIC | Age: 78
End: 2018-04-25
Payer: MEDICARE

## 2018-04-25 DIAGNOSIS — S73.004A HIP DISLOCATION, RIGHT, INITIAL ENCOUNTER (H): ICD-10-CM
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
## 2018-04-25 DIAGNOSIS — T84.020A DISLOCATION OF INTERNAL RIGHT HIP PROSTHESIS, INITIAL ENCOUNTER (H): ICD-10-CM

## 2018-04-25 DIAGNOSIS — R55 NEAR SYNCOPE: ICD-10-CM

## 2018-04-25 PROBLEM — M54.2 NECK PAIN: Status: ACTIVE | Noted: 2018-04-25

## 2018-04-25 PROBLEM — Z86.73 HISTORY OF STROKE: Status: ACTIVE | Noted: 2018-04-25

## 2018-04-25 LAB
ALBUMIN SERPL-MCNC: 2.9 G/DL (ref 3.4–5)
ALBUMIN UR-MCNC: NEGATIVE MG/DL
ALP SERPL-CCNC: 71 U/L (ref 40–150)
ALT SERPL W P-5'-P-CCNC: 18 U/L (ref 0–70)
ANION GAP SERPL CALCULATED.3IONS-SCNC: 6 MMOL/L (ref 3–14)
APPEARANCE UR: CLEAR
AST SERPL W P-5'-P-CCNC: 29 U/L (ref 0–45)
BASOPHILS # BLD AUTO: 0 10E9/L (ref 0–0.2)
BASOPHILS NFR BLD AUTO: 0.3 %
BILIRUB SERPL-MCNC: 1.1 MG/DL (ref 0.2–1.3)
BILIRUB UR QL STRIP: NEGATIVE
BUN SERPL-MCNC: 12 MG/DL (ref 7–30)
CALCIUM SERPL-MCNC: 7.6 MG/DL (ref 8.5–10.1)
CHLORIDE SERPL-SCNC: 111 MMOL/L (ref 94–109)
CO2 SERPL-SCNC: 25 MMOL/L (ref 20–32)
COLOR UR AUTO: YELLOW
CREAT SERPL-MCNC: 0.85 MG/DL (ref 0.66–1.25)
DIFFERENTIAL METHOD BLD: NORMAL
EOSINOPHIL # BLD AUTO: 0.2 10E9/L (ref 0–0.7)
EOSINOPHIL NFR BLD AUTO: 2.1 %
ERYTHROCYTE [DISTWIDTH] IN BLOOD BY AUTOMATED COUNT: 14.3 % (ref 10–15)
GFR SERPL CREATININE-BSD FRML MDRD: 87 ML/MIN/1.7M2
GLUCOSE BLDC GLUCOMTR-MCNC: 138 MG/DL (ref 70–99)
GLUCOSE SERPL-MCNC: 134 MG/DL (ref 70–99)
GLUCOSE UR STRIP-MCNC: 50 MG/DL
HBA1C MFR BLD: 6.2 % (ref 0–6.4)
HCT VFR BLD AUTO: 44.4 % (ref 40–53)
HGB BLD-MCNC: 15.3 G/DL (ref 13.3–17.7)
HGB UR QL STRIP: NEGATIVE
HYALINE CASTS #/AREA URNS LPF: 8 /LPF (ref 0–2)
IMM GRANULOCYTES # BLD: 0 10E9/L (ref 0–0.4)
IMM GRANULOCYTES NFR BLD: 0.2 %
INR PPP: 1.54 (ref 0.86–1.14)
KETONES UR STRIP-MCNC: 5 MG/DL
LEUKOCYTE ESTERASE UR QL STRIP: NEGATIVE
LYMPHOCYTES # BLD AUTO: 3 10E9/L (ref 0.8–5.3)
LYMPHOCYTES NFR BLD AUTO: 31 %
MCH RBC QN AUTO: 30.4 PG (ref 26.5–33)
MCHC RBC AUTO-ENTMCNC: 34.5 G/DL (ref 31.5–36.5)
MCV RBC AUTO: 88 FL (ref 78–100)
MONOCYTES # BLD AUTO: 1 10E9/L (ref 0–1.3)
MONOCYTES NFR BLD AUTO: 9.8 %
MUCOUS THREADS #/AREA URNS LPF: PRESENT /LPF
NEUTROPHILS # BLD AUTO: 5.5 10E9/L (ref 1.6–8.3)
NEUTROPHILS NFR BLD AUTO: 56.6 %
NITRATE UR QL: NEGATIVE
NT-PROBNP SERPL-MCNC: 1494 PG/ML (ref 0–1800)
PH UR STRIP: 5 PH (ref 5–7)
PLATELET # BLD AUTO: 168 10E9/L (ref 150–450)
POTASSIUM SERPL-SCNC: 4.2 MMOL/L (ref 3.4–5.3)
PROT SERPL-MCNC: 6 G/DL (ref 6.8–8.8)
RBC # BLD AUTO: 5.03 10E12/L (ref 4.4–5.9)
RBC #/AREA URNS AUTO: 1 /HPF (ref 0–2)
SODIUM SERPL-SCNC: 142 MMOL/L (ref 133–144)
SOURCE: ABNORMAL
SP GR UR STRIP: 1.03 (ref 1–1.03)
TROPONIN I SERPL-MCNC: <0.015 UG/L (ref 0–0.04)
TROPONIN I SERPL-MCNC: <0.015 UG/L (ref 0–0.04)
UROBILINOGEN UR STRIP-MCNC: 2 MG/DL (ref 0–2)
WBC # BLD AUTO: 9.7 10E9/L (ref 4–11)
WBC #/AREA URNS AUTO: 1 /HPF (ref 0–5)

## 2018-04-25 PROCEDURE — A9270 NON-COVERED ITEM OR SERVICE: HCPCS | Mod: GY | Performed by: PHYSICIAN ASSISTANT

## 2018-04-25 PROCEDURE — 93005 ELECTROCARDIOGRAM TRACING: CPT | Performed by: FAMILY MEDICINE

## 2018-04-25 PROCEDURE — 83036 HEMOGLOBIN GLYCOSYLATED A1C: CPT | Performed by: PHYSICIAN ASSISTANT

## 2018-04-25 PROCEDURE — 36415 COLL VENOUS BLD VENIPUNCTURE: CPT | Performed by: PHYSICIAN ASSISTANT

## 2018-04-25 PROCEDURE — 84484 ASSAY OF TROPONIN QUANT: CPT | Performed by: PHYSICIAN ASSISTANT

## 2018-04-25 PROCEDURE — 37000011 ZZH ANESTHESIA WARD SERVICE

## 2018-04-25 PROCEDURE — 93010 ELECTROCARDIOGRAM REPORT: CPT | Mod: Z6 | Performed by: FAMILY MEDICINE

## 2018-04-25 PROCEDURE — 72170 X-RAY EXAM OF PELVIS: CPT

## 2018-04-25 PROCEDURE — 40000986 XR PELVIS AND HIP RIGHT 1 VIEW

## 2018-04-25 PROCEDURE — 70496 CT ANGIOGRAPHY HEAD: CPT

## 2018-04-25 PROCEDURE — 25000132 ZZH RX MED GY IP 250 OP 250 PS 637: Mod: GY | Performed by: PHYSICIAN ASSISTANT

## 2018-04-25 PROCEDURE — A9270 NON-COVERED ITEM OR SERVICE: HCPCS | Mod: GY | Performed by: FAMILY MEDICINE

## 2018-04-25 PROCEDURE — 27265 TREAT HIP DISLOCATION: CPT | Mod: 54 | Performed by: FAMILY MEDICINE

## 2018-04-25 PROCEDURE — G0378 HOSPITAL OBSERVATION PER HR: HCPCS

## 2018-04-25 PROCEDURE — 25000128 H RX IP 250 OP 636: Performed by: FAMILY MEDICINE

## 2018-04-25 PROCEDURE — 80053 COMPREHEN METABOLIC PANEL: CPT | Performed by: FAMILY MEDICINE

## 2018-04-25 PROCEDURE — 40000671 ZZH STATISTIC ANESTHESIA CASE

## 2018-04-25 PROCEDURE — 96361 HYDRATE IV INFUSION ADD-ON: CPT | Performed by: FAMILY MEDICINE

## 2018-04-25 PROCEDURE — 25000132 ZZH RX MED GY IP 250 OP 250 PS 637: Mod: GY | Performed by: FAMILY MEDICINE

## 2018-04-25 PROCEDURE — 96374 THER/PROPH/DIAG INJ IV PUSH: CPT | Performed by: FAMILY MEDICINE

## 2018-04-25 PROCEDURE — 83880 ASSAY OF NATRIURETIC PEPTIDE: CPT | Performed by: FAMILY MEDICINE

## 2018-04-25 PROCEDURE — 25000125 ZZHC RX 250: Performed by: NURSE ANESTHETIST, CERTIFIED REGISTERED

## 2018-04-25 PROCEDURE — 81003 URINALYSIS AUTO W/O SCOPE: CPT | Performed by: PHYSICIAN ASSISTANT

## 2018-04-25 PROCEDURE — 85025 COMPLETE CBC W/AUTO DIFF WBC: CPT | Performed by: FAMILY MEDICINE

## 2018-04-25 PROCEDURE — 85610 PROTHROMBIN TIME: CPT | Performed by: FAMILY MEDICINE

## 2018-04-25 PROCEDURE — 00000146 ZZHCL STATISTIC GLUCOSE BY METER IP

## 2018-04-25 PROCEDURE — 99207 ZZC CDG-CODE CATEGORY CHANGED: CPT | Performed by: PHYSICIAN ASSISTANT

## 2018-04-25 PROCEDURE — 99220 ZZC INITIAL OBSERVATION CARE,LEVL III: CPT | Performed by: PHYSICIAN ASSISTANT

## 2018-04-25 PROCEDURE — 25000125 ZZHC RX 250: Performed by: FAMILY MEDICINE

## 2018-04-25 PROCEDURE — 25800025 ZZH RX 258: Performed by: FAMILY MEDICINE

## 2018-04-25 PROCEDURE — 84484 ASSAY OF TROPONIN QUANT: CPT | Performed by: FAMILY MEDICINE

## 2018-04-25 PROCEDURE — 27265 TREAT HIP DISLOCATION: CPT | Mod: RT | Performed by: FAMILY MEDICINE

## 2018-04-25 PROCEDURE — 99285 EMERGENCY DEPT VISIT HI MDM: CPT | Mod: 25 | Performed by: FAMILY MEDICINE

## 2018-04-25 RX ORDER — LIDOCAINE HYDROCHLORIDE 10 MG/ML
INJECTION, SOLUTION INFILTRATION; PERINEURAL PRN
Status: DISCONTINUED | OUTPATIENT
Start: 2018-04-25 | End: 2018-04-25

## 2018-04-25 RX ORDER — AMLODIPINE BESYLATE 5 MG/1
5 TABLET ORAL DAILY
Status: DISCONTINUED | OUTPATIENT
Start: 2018-04-26 | End: 2018-04-25

## 2018-04-25 RX ORDER — ONDANSETRON 2 MG/ML
4 INJECTION INTRAMUSCULAR; INTRAVENOUS EVERY 6 HOURS PRN
Status: DISCONTINUED | OUTPATIENT
Start: 2018-04-25 | End: 2018-04-26 | Stop reason: HOSPADM

## 2018-04-25 RX ORDER — DEXTROSE MONOHYDRATE, SODIUM CHLORIDE, AND POTASSIUM CHLORIDE 50; 1.49; 4.5 G/1000ML; G/1000ML; G/1000ML
INJECTION, SOLUTION INTRAVENOUS CONTINUOUS
Status: DISCONTINUED | OUTPATIENT
Start: 2018-04-25 | End: 2018-04-26 | Stop reason: HOSPADM

## 2018-04-25 RX ORDER — ONDANSETRON 2 MG/ML
4 INJECTION INTRAMUSCULAR; INTRAVENOUS EVERY 6 HOURS PRN
Status: DISCONTINUED | OUTPATIENT
Start: 2018-04-25 | End: 2018-04-25

## 2018-04-25 RX ORDER — PROCHLORPERAZINE 25 MG
12.5 SUPPOSITORY, RECTAL RECTAL EVERY 12 HOURS PRN
Status: DISCONTINUED | OUTPATIENT
Start: 2018-04-25 | End: 2018-04-26 | Stop reason: HOSPADM

## 2018-04-25 RX ORDER — OXYBUTYNIN CHLORIDE 5 MG/1
10 TABLET, EXTENDED RELEASE ORAL DAILY
Status: DISCONTINUED | OUTPATIENT
Start: 2018-04-26 | End: 2018-04-26 | Stop reason: HOSPADM

## 2018-04-25 RX ORDER — ETOMIDATE 2 MG/ML
INJECTION INTRAVENOUS PRN
Status: DISCONTINUED | OUTPATIENT
Start: 2018-04-25 | End: 2018-04-25

## 2018-04-25 RX ORDER — ASPIRIN 81 MG/1
81 TABLET, CHEWABLE ORAL DAILY
Status: DISCONTINUED | OUTPATIENT
Start: 2018-04-26 | End: 2018-04-26 | Stop reason: HOSPADM

## 2018-04-25 RX ORDER — METOPROLOL SUCCINATE 25 MG/1
25 TABLET, EXTENDED RELEASE ORAL DAILY
Status: DISCONTINUED | OUTPATIENT
Start: 2018-04-26 | End: 2018-04-25

## 2018-04-25 RX ORDER — NALOXONE HYDROCHLORIDE 0.4 MG/ML
.1-.4 INJECTION, SOLUTION INTRAMUSCULAR; INTRAVENOUS; SUBCUTANEOUS
Status: DISCONTINUED | OUTPATIENT
Start: 2018-04-25 | End: 2018-04-26 | Stop reason: HOSPADM

## 2018-04-25 RX ORDER — NICOTINE POLACRILEX 4 MG
15-30 LOZENGE BUCCAL
Status: DISCONTINUED | OUTPATIENT
Start: 2018-04-25 | End: 2018-04-26 | Stop reason: HOSPADM

## 2018-04-25 RX ORDER — METOPROLOL SUCCINATE 50 MG/1
25 TABLET, EXTENDED RELEASE ORAL DAILY
COMMUNITY
End: 2020-09-05

## 2018-04-25 RX ORDER — MELOXICAM 7.5 MG/1
15 TABLET ORAL DAILY
Status: DISCONTINUED | OUTPATIENT
Start: 2018-04-26 | End: 2018-04-25

## 2018-04-25 RX ORDER — PROCHLORPERAZINE MALEATE 5 MG
5 TABLET ORAL EVERY 6 HOURS PRN
Status: DISCONTINUED | OUTPATIENT
Start: 2018-04-25 | End: 2018-04-26 | Stop reason: HOSPADM

## 2018-04-25 RX ORDER — PROCHLORPERAZINE 25 MG
12.5 SUPPOSITORY, RECTAL RECTAL EVERY 12 HOURS PRN
Status: DISCONTINUED | OUTPATIENT
Start: 2018-04-25 | End: 2018-04-25

## 2018-04-25 RX ORDER — ONDANSETRON 4 MG/1
4 TABLET, ORALLY DISINTEGRATING ORAL EVERY 6 HOURS PRN
Status: DISCONTINUED | OUTPATIENT
Start: 2018-04-25 | End: 2018-04-25

## 2018-04-25 RX ORDER — DEXTROSE MONOHYDRATE 25 G/50ML
25-50 INJECTION, SOLUTION INTRAVENOUS
Status: DISCONTINUED | OUTPATIENT
Start: 2018-04-25 | End: 2018-04-26 | Stop reason: HOSPADM

## 2018-04-25 RX ORDER — OXYCODONE HYDROCHLORIDE 5 MG/1
5-10 TABLET ORAL
Status: DISCONTINUED | OUTPATIENT
Start: 2018-04-25 | End: 2018-04-26 | Stop reason: HOSPADM

## 2018-04-25 RX ORDER — NITROGLYCERIN 0.4 MG/1
0.4 TABLET SUBLINGUAL EVERY 5 MIN PRN
Status: DISCONTINUED | OUTPATIENT
Start: 2018-04-25 | End: 2018-04-26 | Stop reason: HOSPADM

## 2018-04-25 RX ORDER — ONDANSETRON 2 MG/ML
4 INJECTION INTRAMUSCULAR; INTRAVENOUS ONCE
Status: COMPLETED | OUTPATIENT
Start: 2018-04-25 | End: 2018-04-25

## 2018-04-25 RX ORDER — METOPROLOL SUCCINATE 25 MG/1
25 TABLET, EXTENDED RELEASE ORAL DAILY
Status: DISCONTINUED | OUTPATIENT
Start: 2018-04-26 | End: 2018-04-26 | Stop reason: HOSPADM

## 2018-04-25 RX ORDER — PROCHLORPERAZINE MALEATE 5 MG
5 TABLET ORAL EVERY 6 HOURS PRN
Status: DISCONTINUED | OUTPATIENT
Start: 2018-04-25 | End: 2018-04-25

## 2018-04-25 RX ORDER — METOCLOPRAMIDE HYDROCHLORIDE 5 MG/ML
5 INJECTION INTRAMUSCULAR; INTRAVENOUS EVERY 6 HOURS PRN
Status: DISCONTINUED | OUTPATIENT
Start: 2018-04-25 | End: 2018-04-26 | Stop reason: HOSPADM

## 2018-04-25 RX ORDER — LIDOCAINE 40 MG/G
CREAM TOPICAL
Status: DISCONTINUED | OUTPATIENT
Start: 2018-04-25 | End: 2018-04-26 | Stop reason: HOSPADM

## 2018-04-25 RX ORDER — METOCLOPRAMIDE 5 MG/1
5 TABLET ORAL EVERY 6 HOURS PRN
Status: DISCONTINUED | OUTPATIENT
Start: 2018-04-25 | End: 2018-04-26 | Stop reason: HOSPADM

## 2018-04-25 RX ORDER — METOPROLOL TARTRATE 100 MG
100 TABLET ORAL 2 TIMES DAILY
Status: DISCONTINUED | OUTPATIENT
Start: 2018-04-25 | End: 2018-04-25 | Stop reason: ALTCHOICE

## 2018-04-25 RX ORDER — ACETAMINOPHEN 650 MG/1
650 SUPPOSITORY RECTAL EVERY 4 HOURS PRN
Status: DISCONTINUED | OUTPATIENT
Start: 2018-04-25 | End: 2018-04-26 | Stop reason: HOSPADM

## 2018-04-25 RX ORDER — AMOXICILLIN 250 MG
1 CAPSULE ORAL 2 TIMES DAILY PRN
Status: DISCONTINUED | OUTPATIENT
Start: 2018-04-25 | End: 2018-04-26 | Stop reason: HOSPADM

## 2018-04-25 RX ORDER — NITROGLYCERIN 0.4 MG/1
0.4 TABLET SUBLINGUAL EVERY 5 MIN PRN
Status: DISCONTINUED | OUTPATIENT
Start: 2018-04-25 | End: 2018-04-25

## 2018-04-25 RX ORDER — ACETAMINOPHEN 325 MG/1
650 TABLET ORAL EVERY 4 HOURS PRN
Status: DISCONTINUED | OUTPATIENT
Start: 2018-04-25 | End: 2018-04-25

## 2018-04-25 RX ORDER — ONDANSETRON 4 MG/1
4 TABLET, ORALLY DISINTEGRATING ORAL EVERY 6 HOURS PRN
Status: DISCONTINUED | OUTPATIENT
Start: 2018-04-25 | End: 2018-04-26 | Stop reason: HOSPADM

## 2018-04-25 RX ORDER — LOSARTAN POTASSIUM 50 MG/1
50 TABLET ORAL DAILY
Status: DISCONTINUED | OUTPATIENT
Start: 2018-04-26 | End: 2018-04-26 | Stop reason: HOSPADM

## 2018-04-25 RX ORDER — SIMVASTATIN 20 MG
20 TABLET ORAL AT BEDTIME
Status: DISCONTINUED | OUTPATIENT
Start: 2018-04-25 | End: 2018-04-26 | Stop reason: HOSPADM

## 2018-04-25 RX ORDER — ACETAMINOPHEN 325 MG/1
650 TABLET ORAL EVERY 4 HOURS PRN
Status: DISCONTINUED | OUTPATIENT
Start: 2018-04-25 | End: 2018-04-26 | Stop reason: HOSPADM

## 2018-04-25 RX ORDER — IOPAMIDOL 755 MG/ML
70 INJECTION, SOLUTION INTRAVASCULAR ONCE
Status: COMPLETED | OUTPATIENT
Start: 2018-04-25 | End: 2018-04-25

## 2018-04-25 RX ADMIN — ONDANSETRON 4 MG: 2 INJECTION, SOLUTION INTRAMUSCULAR; INTRAVENOUS at 14:37

## 2018-04-25 RX ADMIN — ACETAMINOPHEN 650 MG: 325 TABLET, FILM COATED ORAL at 21:19

## 2018-04-25 RX ADMIN — LIDOCAINE HYDROCHLORIDE 100 MG: 10 INJECTION, SOLUTION INFILTRATION; PERINEURAL at 15:11

## 2018-04-25 RX ADMIN — SIMVASTATIN 20 MG: 20 TABLET, FILM COATED ORAL at 21:20

## 2018-04-25 RX ADMIN — OXYCODONE HYDROCHLORIDE 5 MG: 5 TABLET ORAL at 21:20

## 2018-04-25 RX ADMIN — ETOMIDATE 8 MG: 2 INJECTION, SOLUTION INTRAVENOUS at 15:11

## 2018-04-25 RX ADMIN — POTASSIUM CHLORIDE, DEXTROSE MONOHYDRATE AND SODIUM CHLORIDE: 150; 5; 450 INJECTION, SOLUTION INTRAVENOUS at 14:40

## 2018-04-25 RX ADMIN — IOPAMIDOL 70 ML: 755 INJECTION, SOLUTION INTRAVENOUS at 20:30

## 2018-04-25 RX ADMIN — APIXABAN 5 MG: 5 TABLET, FILM COATED ORAL at 21:19

## 2018-04-25 RX ADMIN — SODIUM CHLORIDE 100 ML: 9 INJECTION, SOLUTION INTRAVENOUS at 20:30

## 2018-04-25 ASSESSMENT — LIFESTYLE VARIABLES: TOBACCO_USE: 1

## 2018-04-25 ASSESSMENT — ENCOUNTER SYMPTOMS: DYSRHYTHMIAS: 1

## 2018-04-25 NOTE — ED PROVIDER NOTES
History     Chief Complaint   Patient presents with     Hip Pain     fell at home.     HPI    Bryson Mendez is a 77 year old male who has a history of coronary artery disease, atrial fibrillation, hypertension, diabetes type II, hyperlipidemia,and status post right hip replacement who presents to the ED via EMS for evaluation of right hip pain after a fall.  Patient reports he was able to take a walk this morning with no complications.  He has had chronic neck pain which was worsened after hip replacement surgery 2 months ago.  He was experiencing significant pain in his neck which radiated into the shoulder and around the axilla when he began to feel hot, clammy, a little short of breath, and nauseous.  He went to the bathroom and got down to the floor.  When he got off the bathroom floor, he stepped with his right leg and when he moved his left leg he fell back into the wall and slid down, unsure of how he landed.  He denies loss of conscientious, head injury, chest pain, urinary symptoms, and bowel symptoms.  Patient was unable to stand but crawled out of the bathroom so EMS would be able to access him easier.  Since then he has been unable to move his right hip.    Patient had his right hip replaced on 1/19/2018 by Dr. Reich.  His nausea has resolved after his transport but it occurred again during his time in the emergency department.    Problem List:    Patient Active Problem List    Diagnosis Date Noted     Closed right hip fracture (H) 01/25/2018     Priority: Medium     S/P hip replacement 01/24/2018     Priority: Medium     Failed total hip arthroplasty (H) 01/19/2018     Priority: Medium     Benign essential hypertension 01/19/2018     Priority: Medium     Benign non-nodular prostatic hyperplasia without lower urinary tract symptoms 03/15/2017     Priority: Medium     Diabetes mellitus type 2 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 (H) 04/01/2016     Priority: Medium     Walls's esophagus without dysplasia  07/09/2015     Priority: Medium     No dysplasia. He will need a gastroscopy every 3 years for surveillance       A-fib (H) 07/14/2014     Priority: Medium     CAD (coronary artery disease) 06/18/2013     Priority: Medium     Overview:   -7/21/07 - Acute anterior MI complicated by Vent Fib requiring electrshock  -7/21/07 - emergent angioplasty and stenting of complete occlusion of proximal LAD and Ist diagonal branch of                        LAD  -7/22/07 - angiioplasty and revascularization of left circumflex artery with stent when had recurrant chest pain  -left ventricular systolic and diastolic dysfunction with ejection fraction - 30 %  -11/8/07 - coronary angiography shows mod in-stent stenosis of the LAD, patent circumflex stent, and mod right coronary disease              Angiogram on 12-5-08, previously placed proximal and mid left anterior descending stent to be widely patent as previously placed, first diagnosed had a 30-40% restenosis which was unchanged from his May 2008 study. His previously placed left circumflex stent was also widely patent. There is also PDA with an ostial 70% lesion which was unchanged.  12-4-08 LV systolic function is at the lower limits of normal with visually estimated ejection fraction of 50 %. Regional wall motion is normal. Mild Left atrial enlargement. Mild mitral and tricuspid regurgitation. Pulmonary artery pressure estimated at 26 + RAP.  MI in 2007, stenting of LAD, left circumflex-- multiple stents       Pre-diabetes 06/18/2013     Priority: Medium     MAIDA (obstructive sleep apnea) 06/18/2013     Priority: Medium     Uses CPAP       Osteoarthritis of hip 06/17/2013     Priority: Medium     Do you wish to do the replacement in the background? yes         S/P PTCA (percutaneous transluminal coronary angioplasty) 06/01/2011     Priority: Medium     Overview:   - 7/21/07 - two vessels  -7/23/07 - one vessel  -5/13/08 -one vessel       Mixed hyperlipidemia 09/10/2007      Priority: Medium     Acute myocardial infarction of anterolateral wall (H) 09/06/2007     Priority: Medium     Overview:   4/29/2008 Stress test. Large area of apical, anterior, anteroseptal and anteroloateral ischemia. Left anterior decemding coronary artery distribution is suggested. FUNCTION: Mildly reduced. Resting wall motion abnormalities were detected. Ejection Fraction is 42%. 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 COMMENTS: Compared to study of 10/2/2007, changes have occurred. The area of ischemia is larger.  5/13/2008 Cardiac Catheterization Report-85% stenosis in the proximal LAD, stent in the prozimal LAD aartery is widely patent-stent in the mid circumflex artery is widely patent.  Intervention-successful stenting (drug eluting) of the proximal LAD  12/3/08 Patient underwent an angiogram on December 5, 2008 which had previously place proximal and mid left anterior descending stent to be widely patent as previously placed. First diagnosed 30-40% restenosis which was unchanged from his may 2008 study. His previously placed left circumflex stent was also widely patent. There is also a PDA with an ostial 70% lesion which was unchanged.  12/4/2008 1. LV systolic function is at the lower limits of normal with visually estimated ejection fraction of 50 %. 2. Regional wall motion is normal. 3. Pulmonary artery pressure is estimated at 26 mmhg + RAP. 4. Mild left atrial enlargement.       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     Priority: Medium        Past Medical History:    Past Medical History:   Diagnosis Date     A-fib (H)      Arthritis      Chronic low back pain      Chronic pain      Coronary artery disease      Depression      Diabetes mellitus (H)      Hypertension        Past Surgical History:    Past Surgical History:   Procedure Laterality Date      ARTHROPLASTY HIP  6/10/2011    Procedure:ARTHROPLASTY HIP; Minimally Invasive Surgery,J&J; Surgeon:DAVID REICH; Location:[facility]     ARTHROPLASTY HIP  6/17/2013    Procedure: ARTHROPLASTY HIP;  Right Total Hip Arthroplasty;  Surgeon: David Reich MD;  Location: [facility]     ARTHROPLASTY REVISION HIP Right 1/19/2018    Procedure: ARTHROPLASTY REVISION HIP;  Right Hip Acetabulum Revision;  Surgeon: David Reich MD;  Location: [facility]     CARDIAC SURGERY      states he has 5 stents     COLONOSCOPY  3/28/2011    COLONOSCOPY performed by KYARA SIMMONS at [facility]     ESOPHAGOSCOPY, GASTROSCOPY, DUODENOSCOPY (EGD), COMBINED N/A 7/7/2015    Procedure: COMBINED ESOPHAGOSCOPY, GASTROSCOPY, DUODENOSCOPY (EGD), BIOPSY SINGLE OR MULTIPLE;  Surgeon: Jody Conner MD;  Location: [facility]     H ABLATION ATRIAL FLUTTER  7/15/14     SURGICAL HISTORY OF -       UPP for snoring     VASCULAR SURGERY       VASECTOMY  1970's       Family History:    Family History   Problem Relation Age of Onset     Coronary Artery Disease Father      MI     Colon Cancer Mother        Social History:  Marital Status:   [2]  Social History   Substance Use Topics     Smoking status: Former Smoker     Quit date: 4/1/1971     Smokeless tobacco: Not on file     Alcohol use Yes      Comment: occasional        Medications:      acetaminophen (TYLENOL) 325 MG tablet   amlodipine (NORVASC) 5 MG tablet   Cholecalciferol (VITAMIN D3 PO)   HYDROmorphone (DILAUDID) 2 MG tablet   isosorbide mononitrate (IMDUR) 30 MG 24 hr tablet   losartan (COZAAR) 50 MG tablet   meloxicam (MOBIC) 15 MG tablet   metoprolol (LOPRESSOR) 100 MG tablet   Multiple Vitamins-Minerals (MULTIVITAL PO)   Multiple Vitamins-Minerals (PRESERVISION/LUTEIN PO)   nitroglycerin (NITROSTAT) 0.4 MG SL tablet   Omega-3 Fatty Acids (OMEGA 3 PO)   omeprazole (PRILOSEC) 40 MG capsule   order for DME   order for DME   OVER-THE-COUNTER   oxybutynin (DITROPAN-XL) 5 MG 24 hr  tablet   senna-docusate (SENOKOT-S;PERICOLACE) 8.6-50 MG per tablet   simvastatin (ZOCOR) 20 MG tablet   VIAGRA 100 MG tablet         Review of Systems  All other systems are reviewed and are negative    Physical Exam   BP: 121/81  Pulse: 52  Heart Rate: 65  Resp: 20  SpO2: 97 %      Physical Exam  Nursing note and vitals were reviewed.  Constitutional: Awake and alert, adequately nourished and developed appearing 77-year-old in moderate discomfort, who does not appear acutely ill, and who answers questions appropriately and cooperates with examination.  HEENT: Atraumatic and normocephalic.  PERRLA.  EOMI.   Neck: Freely mobile in 6 directions without discomfort.  Range of motion is limited somewhat consistent with age.  Cardiovascular: Cardiac examination reveals normal heart rate and irregular rhythm without murmur.  Pulmonary/Chest: Breathing is unlabored.  Breath sounds are clear and equal bilaterally.  There no retractions, tachypnea, rales, wheezes, or rhonchi.  Abdomen: Soft, nontender, no HSM or masses rebound or guarding.  Musculoskeletal: Extremities are warm and well-perfused.  Right lower extremity is internally rotated and foreshortened.  I cannot move the hip.  No swelling about the knee or ankle.  Foot is warm and well perfused.  Neurological: Alert, oriented, thought content logical, coherent   Skin: Warm, dry, no rashes.  Psychiatric: Affect broad and appropriate.    ED Course     ED Course     Procedures               EKG Interpretation:      Interpreted by Luis Montemayor  Time reviewed: 14:19  Symptoms at time of EKG: nausea   Rhythm: normal sinus   Rate: normal  Axis: normal  Ectopy: Occasional PVCs  Conduction: Right bundle branch block  ST Segments/ T Waves: No ST-T wave changes  Q Waves: none  Comparison to prior: Here to the EKG from January 24, 2018, no significant change.    Clinical Impression: Sinus rhythm with right bundle branch block and occasional PVCs unchanged from  previous          Critical Care time:  none               Results for orders placed or performed during the hospital encounter of 04/25/18 (from the past 24 hour(s))   CBC with platelets differential   Result Value Ref Range    WBC 9.7 4.0 - 11.0 10e9/L    RBC Count 5.03 4.4 - 5.9 10e12/L    Hemoglobin 15.3 13.3 - 17.7 g/dL    Hematocrit 44.4 40.0 - 53.0 %    MCV 88 78 - 100 fl    MCH 30.4 26.5 - 33.0 pg    MCHC 34.5 31.5 - 36.5 g/dL    RDW 14.3 10.0 - 15.0 %    Platelet Count 168 150 - 450 10e9/L    Diff Method Automated Method     % Neutrophils 56.6 %    % Lymphocytes 31.0 %    % Monocytes 9.8 %    % Eosinophils 2.1 %    % Basophils 0.3 %    % Immature Granulocytes 0.2 %    Absolute Neutrophil 5.5 1.6 - 8.3 10e9/L    Absolute Lymphocytes 3.0 0.8 - 5.3 10e9/L    Absolute Monocytes 1.0 0.0 - 1.3 10e9/L    Absolute Eosinophils 0.2 0.0 - 0.7 10e9/L    Absolute Basophils 0.0 0.0 - 0.2 10e9/L    Abs Immature Granulocytes 0.0 0 - 0.4 10e9/L   INR   Result Value Ref Range    INR 1.54 (H) 0.86 - 1.14   Comprehensive metabolic panel   Result Value Ref Range    Sodium 142 133 - 144 mmol/L    Potassium 4.2 3.4 - 5.3 mmol/L    Chloride 111 (H) 94 - 109 mmol/L    Carbon Dioxide 25 20 - 32 mmol/L    Anion Gap 6 3 - 14 mmol/L    Glucose 134 (H) 70 - 99 mg/dL    Urea Nitrogen 12 7 - 30 mg/dL    Creatinine 0.85 0.66 - 1.25 mg/dL    GFR Estimate 87 >60 mL/min/1.7m2    GFR Estimate If Black >90 >60 mL/min/1.7m2    Calcium 7.6 (L) 8.5 - 10.1 mg/dL    Bilirubin Total 1.1 0.2 - 1.3 mg/dL    Albumin 2.9 (L) 3.4 - 5.0 g/dL    Protein Total 6.0 (L) 6.8 - 8.8 g/dL    Alkaline Phosphatase 71 40 - 150 U/L    ALT 18 0 - 70 U/L    AST 29 0 - 45 U/L   Troponin I   Result Value Ref Range    Troponin I ES <0.015 0.000 - 0.045 ug/L   Nt probnp inpatient (BNP)   Result Value Ref Range    N-Terminal Pro BNP Inpatient 1494 0 - 1800 pg/mL   XR Pelvis Port 1/2 Views    Narrative    XR PELVIS PORT 1/2 VW 4/25/2018 2:50 PM    HISTORY: Pain.  "Fall.    COMPARISON: 1/22/2018.      Impression    IMPRESSION: The patient has bilateral hip prostheses in place. The  head of the femoral component of the right hip prosthesis is  dislocated superior with respect to the acetabular cup portion of the  right hip prosthesis.     BHARTI PAGE MD       Medications   dextrose 5% and 0.45% NaCl + KCl 20 mEq/L infusion ( Intravenous New Bag 4/25/18 1440)   ondansetron (ZOFRAN) injection 4 mg (4 mg Intravenous Given 4/25/18 7087)       Patient Assessed.  Course of care outlined.    Procedure: Reduction of right hip dislocation  Consent: I reviewed with him the risks of injury to nerves, arteries, bones, soft tissues.  Anesthesia was performed by the CRNA.  He received etomidate.  Following induction of adequate sedation, the right hip was easily reduced using the \"Captain Joe technique.\"  Postreduction x-rays showed an adequate reduction with no complications.  The procedure was well-tolerated.    Assessments & Plan (with Medical Decision Making)     77-year-old male presented with an episode of neck pain progressing to nausea, diaphoresis, and lightheadedness culminating in a fall that was not apparently due to syncope though he did have near syncope.  As a consequence of the fall he sustained dislocation of his recent replaced right hip.  The hip was reduced as above.  The cause of his near syncope is uncertain.  I favor neurally mediated near syncope caused by his significant neck pain.  However given his multiple comorbidities and significant cardiac disease, he is at risk for more worrisome causes.  On arrival he was significantly bradycardic but after being placed on the monitor his heart rate remained in the 60s after initially being in the 40s and 50s.  His underlying rhythm is sinus with frequent PVCs which is on change from his previous EKG.  For this reason I recommended a period of observation on telemetry monitoring to observe for evidence of a more " worrisome cause for his symptoms.  I discussed his case with the PA on call for orthopedics for Dr. Harrison and they will consult during his hospitalization.  I discussed his case with the hospital service and they will assume care on admission.    I have reviewed the nursing notes.    I have reviewed the findings, diagnosis, plan and need for follow up with the patient.       New Prescriptions    No medications on file       Final diagnoses:   Near syncope   Hip dislocation, right, initial encounter (H)     This document serves as a record of the services and decisions personally performed and made by Luis Montemayor MD. It was created on HIS/HER behalf by Yaquelin Marino, a trained medical scribe. The creation of this document is based the provider's statements to the medical scribe.  Yaquelin Marino 1:37 PM 4/25/2018    Provider:   The information in this document, created by the medical scribe for me, accurately reflects the services I personally performed and the decisions made by me. I have reviewed and approved this document for accuracy prior to leaving the patient care area.  Luis Montemayor MD 1:37 PM 4/25/2018 4/25/2018   [facility] EMERGENCY DEPARTMENT     Luis Montemayor MD  04/25/18 7069

## 2018-04-25 NOTE — ANESTHESIA POSTPROCEDURE EVALUATION
Patient: Bryson Mendez    * No procedures listed *    Diagnosis:* No pre-op diagnosis entered *  Diagnosis Additional Information: No value filed.    Anesthesia Type:  No value filed.    Note:  Anesthesia Post Evaluation    Patient location during evaluation: ED  Patient participation: Unable to evaluate secondary to administered sedation  Level of consciousness: awake  Pain management: adequate  Airway patency: patent  Cardiovascular status: acceptable and hemodynamically stable  Respiratory status: acceptable, spontaneous ventilation and nasal cannula  Hydration status: acceptable  PONV: none     Anesthetic complications: None    Comments: ER RN with patient        Last vitals:  Vitals:    04/25/18 1510 04/25/18 1515 04/25/18 1520   BP:  129/85 141/79   Pulse:      Resp: 9 (!) 7 8   SpO2: 100% 100% 98%         Electronically Signed By: ZOILA Serrano CRNA  April 25, 2018  3:30 PM

## 2018-04-25 NOTE — IP AVS SNAPSHOT
` ` Patient Information     Patient Name Sex     Bryson Anderson ([id number]) Male 1940       Room Bed    2409 9939-71      Patient Demographics     Address Phone E-mail Address    [address]  [facility] [place] [facility] [address] [phone] (Home)  none (Work)  [phone] (Mobile) [email or web address]      Patient Ethnicity & Race     Ethnic Group Patient Race    American White      Emergency Contact(s)     Name Relation Home Work Mobile    MICHAEL ANDERSON Spouse [phone] none [phone]      Documents on File        Status Date Received Description       Documents for the Patient    Privacy Notice - [place] Received 11     Face Sheet  () 08     Consent Form  07     Consent Form  08     Insurance Card Received 11 HP    External Medication Information Consent Accepted 06/25/15     Patient ID Scan Refused 17     Consent for Services - Hospital/Clinic Received () 04/15/11     Medicare Lifetime Days Consent Received 06/10/11     Consent for Services - Hospital/Clinic  () 09/15/11     Consent for EHR Access  13 Copied from existing Consent for services - C/HOD collected on 09/15/2011    [facility] Specified Other       Consent for Services - Hospital/Clinic Received () 13     HIM HARITHA Authorization  13 [facility]    HIM HARITHA Authorization - File Only  13 DR. COLLIER    Insurance Card Received 14 Medicare /BCBS    Physical Therapy Certification Received 13 elec cert Dr Reich for total hip    HIM HARITHA Authorization  10/30/13     Consent for Services - Hospital/Clinic Received () 14     Insurance Card Received 07/07/15 bcbs    HIM HARITHA Authorization  10/07/15     Consent for Services/Privacy Notice - Hospital/Clinic Received () 16     Physical Therapy Certification Received 16 Medicare cert (16-16) faxed to MD at [phone] for MD signature; signed cert sent  to [facility] for scanning    Physical Therapy Certification Received 04/22/16 Medicare recert (3/28/16-5/9/16) faxed to MD at [phone]; signed cert sent to [facility] for scanning    Physical Therapy Certification Received 05/19/16 Medicare cert (5/9/16-6/20/16) sent via fax to [phone] for signature; refaxed to [phone] on 5/18; signed cert sent to [facility] for scanning    Care Everywhere Prospective Auth Received 01/19/18     Insurance Card Received 02/05/18 BCBS [place]    Consent for Services/Privacy Notice - Hospital/Clinic Received 01/19/18     Physical Therapy Certification Received 02/02/18 MedCert (1/30-4/24) walked over to .    Occupational Therapy Certification Received 03/01/18 Med Cert 2/13/18-4/15/18 faxed to     Speech Therapy Certification Received 02/26/18 2/14-5/15/18 faxed to Dr Thien Davison    Business/Insurance/Care Coordination/Health Form - Patient  02/23/18 ADULT REHABILITATION ATTENDANCE POLICY    Consent for Services - Hospital and Clinic Received 04/25/18     HIE Auth Received 04/25/18     RW Privacy Notice  (Deleted)      Consent for Services - Hospital/Clinic Received (Deleted) 07/13/14        Documents for the Encounter    CMS IM for Patient Signature       Observation Notice Received 04/25/18     ECG   ECG Report      Admission Information     Attending Provider Admitting Provider Admission Type Admission Date/Time    Chuck Hernandez MD Eikens, John Patrick, MD Emergency 04/25/18  1328    Discharge Date Hospital Service Auth/Cert Status Service Area     Hospitalist Incomplete [facility]    Unit Room/Bed Admission Status       [facility] 2404/2404-01 Admission (Confirmed)       Admission     Complaint    Near syncope, Syncope      Hospital Account     Name Acct ID Class Status Primary Coverage    Bryson Mendez [id number] Observation Open MEDICARE - MEDICARE FOR HB SUPPLEMENT            Guarantor Account (for Hospital Account  #[id number])     Name Relation to Pt Service Area Active? Acct Type    Bryson Mendez  FCS Yes Personal/Family    Address Phone          [address] [place] [address] [phone](H)              Coverage Information (for Hospital Account #[id number])     1. MEDICARE/MEDICARE FOR HB SUPPLEMENT     F/O Payor/Plan Precert #    MEDICARE/MEDICARE FOR HB SUPPLEMENT     Subscriber Subscriber #    Bryson Mendez [id number]    Address Phone    [employer] CLAIMS  [address]  [place] [address] [phone]          2. BCBS/BCBS PLATINUM BLUE     F/O Payor/Plan Precert #    BCBS/BCBS PLATINUM BLUE     Subscriber Subscriber #    Bryson Mendez [id number]    Address Phone    [address] [address]  [place], [place] [id number] [phone]

## 2018-04-25 NOTE — IP AVS SNAPSHOT
MRN:[id number]                      After Visit Summary   4/25/2018    Bryson Mendez    MRN: [id number]           Thank you!     Thank you for choosing [facility] for your care. Our goal is always to provide you with excellent care. Hearing back from our patients is one way we can continue to improve our services. Please take a few minutes to complete the written survey that you may receive in the mail after you visit with us. Thank you!        Patient Information     Date Of Birth          1940        Designated Caregiver       Most Recent Value    Caregiver    Will someone help with your care after discharge? yes    Name of designated caregiver Toya Mendez (wife)    Phone number of caregiver [phone]    Caregiver address same      About your hospital stay     You were admitted on:  April 25, 2018 You last received care in the:  [facility]    You were discharged on:  April 26, 2018       Who to Call     For medical emergencies, please call 911.  For non-urgent questions about your medical care, please call your primary care provider or clinic, [phone]          Attending Provider     Provider Specialty    Luis Montemayor MD Emergency Medicine    [facility]       Primary Care Provider Office Phone # Fax #    Thien Davison [phone]      Further instructions from your care team       Follow up with your regular doctor within one week.  Return if more problems.  No hip flexion beyond 90 degrees, no adduction across midline, no hip internal rotation. Avoid deep squatting, bending or lifting. Given this dislocation is secondary to trauma vs atraumatic dislocation, the hardware is likely stable without further bracing. Recommend follow up with Dr. Reich in 1-2weeks for general follow up, sooner if pain worsens. Call [phone]    Pending Results     No orders found for last 3 day(s).            Statement of  "Approval     Ordered          18 1209  I have reviewed and agree with all the recommendations and orders detailed in this document.  EFFECTIVE NOW     Approved and electronically signed by:  Chuck Hernandez MD             Admission Information     Date & Time Provider Department Dept. Phone    2018 Chuck Hernandez MD [facility] [phone]      Your Vitals Were     Blood Pressure Pulse Temperature Respirations Height Weight    141/57 (BP Location: Right arm) 44 97.5  F (36.4  C) (Oral) 18 1.778 m (5' 10\") 78.7 kg (173 lb 8 oz)    Pulse Oximetry BMI (Body Mass Index)                95% 24.89 kg/m2          MyChart Information     [employer] lets you send messages to your doctor, view your test results, renew your prescriptions, schedule appointments and more. To sign up, go to [email or web address] . Click on \"Log in\" on the left side of the screen, which will take you to the Welcome page. Then click on \"Sign up Now\" on the right side of the page.     You will be asked to enter the access code listed below, as well as some personal information. Please follow the directions to create your username and password.     Your access code is: [email or web address]  Expires: 2018 12:18 PM     Your access code will  in 90 days. If you need help or a new code, please call your [facility] clinic or [phone].        Care EveryWhere ID     This is your Care EveryWhere ID. This could be used by other organizations to access your [facility] medical records  [id number]        Equal Access to Services     [facility]: Hadii christa spicer hadasho Sojim, waaxda luqadaha, qaybta kaalmada rochelle payne. So [facility] [phone].    ATENCIÓN: Si habla español, tiene a orozco disposición servicios gratuitos de asistencia lingüística. Llame al [phone].    We comply with applicable federal civil rights laws and [place] laws. We do not discriminate on the basis " of race, color, national origin, age, disability, sex, sexual orientation, or gender identity.               Review of your medicines      START taking        Dose / Directions    order for DME        Equipment being ordered: walker with wheels.   Quantity:  1 Units   Refills:  0         CONTINUE these medicines which have NOT CHANGED        Dose / Directions    acetaminophen 325 MG tablet   Commonly known as:  TYLENOL   Used for:  S/P revision of total hip        Dose:  650 mg   Take 2 tablets (650 mg) by mouth every 4 hours as needed for other (surgical pain)   Quantity:  100 tablet   Refills:  0       ASPIRIN ADULT LOW STRENGTH PO        Dose:  81 mg   Take 81 mg by mouth daily   Refills:  0       ELIQUIS PO   Indication:  afib        Dose:  5 mg   Take 5 mg by mouth 2 times daily   Refills:  0       isosorbide mononitrate 30 MG 24 hr tablet   Commonly known as:  IMDUR   Used for:  Coronary artery disease involving native heart with angina pectoris, unspecified vessel or lesion type (H)        Dose:  15 mg   Take 0.5 tablets (15 mg) by mouth daily   Quantity:  30 tablet   Refills:  0       losartan 50 MG tablet   Commonly known as:  COZAAR   Indication:  High Blood Pressure Disorder        Dose:  50 mg   Take 50 mg by mouth daily   Refills:  0       metoprolol succinate 50 MG 24 hr tablet   Commonly known as:  TOPROL-XL   Indication:  High Blood Pressure Disorder, whethet it was succinate or tartrate, reinoso was decreased to 50 mg daily        Dose:  25 mg   Take 25 mg by mouth daily   Refills:  0       * PRESERVISION/LUTEIN PO        Dose:  1 capsule   Take 1 capsule by mouth 2 times daily   Refills:  0       * MULTIVITAL PO        Dose:  1 tablet   Take 1 tablet by mouth 2 times daily   Refills:  0       nitroGLYcerin 0.4 MG sublingual tablet   Commonly known as:  NITROSTAT   Indication:  afib        Dose:  0.4 mg   Place 0.4 mg under the tongue every 5 minutes as needed for chest pain   Quantity:  25 tablet    Refills:  0       OMEGA 3 PO        Dose:  1 capsule   Take 1 capsule by mouth 2 times daily   Refills:  0       omeprazole 40 MG capsule   Commonly known as:  priLOSEC   Indication:  Gastroesophageal Reflux Disease        Dose:  40 mg   Take 40 mg by mouth daily   Refills:  0       * order for DME        At Bedtime CPAP   Refills:  0       * order for DME   Used for:  S/P revision of total hip        Equipment being ordered: Walker Wheels () and Walker () Treatment Diagnosis: s/p R ROMAN   Quantity:  1 Units   Refills:  0       OVER-THE-COUNTER        Dose:  2 tablet   2 tablets daily Tebs for eyes.   Refills:  0       oxybutynin 5 MG 24 hr tablet   Commonly known as:  DITROPAN-XL   Indication:  Frequent Urination        Dose:  10 mg   Take 10 mg by mouth daily   Refills:  0       senna-docusate 8.6-50 MG per tablet   Commonly known as:  SENOKOT-S;PERICOLACE   Used for:  S/P revision of total hip        Dose:  1 tablet   Take 1 tablet by mouth 2 times daily as needed for constipation   Quantity:  100 tablet   Refills:  0       VIAGRA 100 MG tablet   Indication:  Erectile Dysfunction   Generic drug:  sildenafil        Dose:  100 mg   Take 100 mg by mouth daily as needed   Refills:  2       VITAMIN D3 PO        Dose:  5000 Units   Take 5,000 Units by mouth every 3 days   Refills:  0       ZOCOR 20 MG tablet   Indication:  High Amount of Fats in the Blood   Generic drug:  simvastatin        Dose:  20 mg   Take 20 mg by mouth At Bedtime   Refills:  0       * Notice:  This list has 4 medication(s) that are the same as other medications prescribed for you. Read the directions carefully, and ask your doctor or other care provider to review them with you.         Where to get your medicines      Some of these will need a paper prescription and others can be bought over the counter. Ask your nurse if you have questions.     Bring a paper prescription for each of these medications     order for DME                 Protect others around you: Learn how to safely use, store and throw away your medicines at www.disposemymeds.org.             Medication List: This is a list of all your medications and when to take them. Check marks below indicate your daily home schedule. Keep this list as a reference.      Medications           Morning Afternoon Evening Bedtime As Needed    acetaminophen 325 MG tablet   Commonly known as:  TYLENOL   Take 2 tablets (650 mg) by mouth every 4 hours as needed for other (surgical pain)   Last time this was given:  650 mg on 4/26/2018  4:11 AM                                   ASPIRIN ADULT LOW STRENGTH PO   Take 81 mg by mouth daily   Last time this was given:  81 mg on 4/26/2018  8:52 AM                                   ELIQUIS PO   Take 5 mg by mouth 2 times daily   Last time this was given:  5 mg on 4/26/2018  8:52 AM                                   isosorbide mononitrate 30 MG 24 hr tablet   Commonly known as:  IMDUR   Take 0.5 tablets (15 mg) by mouth daily   Last time this was given:  15 mg on 4/26/2018  8:51 AM                                   losartan 50 MG tablet   Commonly known as:  COZAAR   Take 50 mg by mouth daily   Last time this was given:  50 mg on 4/26/2018  8:52 AM                                   metoprolol succinate 50 MG 24 hr tablet   Commonly known as:  TOPROL-XL   Take 25 mg by mouth daily   Last time this was given:  25 mg on 4/26/2018  8:52 AM                                   * PRESERVISION/LUTEIN PO   Take 1 capsule by mouth 2 times daily                                   * MULTIVITAL PO   Take 1 tablet by mouth 2 times daily                                   nitroGLYcerin 0.4 MG sublingual tablet   Commonly known as:  NITROSTAT   Place 0.4 mg under the tongue every 5 minutes as needed for chest pain                                   OMEGA 3 PO   Take 1 capsule by mouth 2 times daily                                   omeprazole 40 MG capsule   Commonly known as:   priLOSEC   Take 40 mg by mouth daily   Last time this was given:  40 mg on 4/26/2018  8:52 AM                                   * order for DME   At Bedtime CPAP                                * order for DME   Equipment being ordered: Walker Wheels () and Walker () Treatment Diagnosis: s/p R ROMAN                                order for DME   Equipment being ordered: walker with wheels.                                OVER-THE-COUNTER   2 tablets daily Tebs for eyes.                                   oxybutynin 5 MG 24 hr tablet   Commonly known as:  DITROPAN-XL   Take 10 mg by mouth daily   Last time this was given:  10 mg on 4/26/2018  8:51 AM                                   senna-docusate 8.6-50 MG per tablet   Commonly known as:  SENOKOT-S;PERICOLACE   Take 1 tablet by mouth 2 times daily as needed for constipation                                   VIAGRA 100 MG tablet   Take 100 mg by mouth daily as needed   Generic drug:  sildenafil                                   VITAMIN D3 PO   Take 5,000 Units by mouth every 3 days                                ZOCOR 20 MG tablet   Take 20 mg by mouth At Bedtime   Last time this was given:  20 mg on 4/25/2018  9:20 PM   Generic drug:  simvastatin                                * Notice:  This list has 4 medication(s) that are the same as other medications prescribed for you. Read the directions carefully, and ask your doctor or other care provider to review them with you.

## 2018-04-25 NOTE — H&P
"[facility]    History and Physical  Hospital Medicine       Date of Admission:  4/25/2018  Date of Service: 4/25/2018     CC: \"I don't feel right,\" right hip pain secondary to fall    Assessment & Plan   Bryson Mendez is a 77 year old male with a past medical history significant for coronary artery disease s/p PCI x 5, left thalamic stroke, atrial fibrillation s/p ablation on Eliquis, hypertension, dyslipidemia, type 2 diabetes mellitus (diet controlled), gastroesophageal reflux disease, overactive bladder, and obstructive sleep apnea on CPAP who presents on 4/25/2018 with a pre-syncopal event as well as right prosthetic hip dislocation secondary to fall.      Pre-syncope  Woke on 4/25 stating \"I just don't feel right.\" Went for a short walk and after returning home developed neck pain and pre-syncopal symptoms including diaphoresis, headache, \"tunnel vision,\" generalized weakness, and nausea. Went to the bathroom and attempted to stand, which resulted in fall (see below). Patient denies true syncope, loss of consciousness, or head trauma. Unclear etiology of pre-syncopal episode - differential includes pain mediated, vasovagal, neurologic or vascular, hypoglycemia, hypotension, heart arrhythmia, dehydration. EKG showing sinus rhythm with ectopy and right bundle branch block (all noted on previous EKGs). Initial troponin negative. Vital signs stable. No evidence for infection.  - Monitor on telemetry  - Check orthostatic blood pressures  - Rehydration   - PT/OT consults  - Trend troponins  - UA pending  - CTA head and neck to evaluate for possible vascular etiologies in setting of neck pain      Fall, Displacement of internal right hip prosthesis (H)  Patient with revision of right hip on 1/19/18 at [facility]. Patient fell in association with pre-syncopal episode - he attributes fall to weakness. Was able to catch himself during fall. Denies syncope, head trauma, loss of " consciousness. Landed on right hip and felt immediate pain. Pelvis x-ray reveals dislocation of head of femoral component of right hip prosthesis. Patient was sedated with MAC sedation in the emergency department and dislocation was reduced and confirm by repeat x-ray. Case was discussed between emergency department and ortho providers.  - Ortho consult for recommendations of possible bracing and management of prosthetic hip dislocation      Neck pain  Patient states that his neck pain has been chronic in nature since his stroke in Feb 2018. Cervical CT from 2017 (prior to stroke) showing multilevel degenerative disease and spinal stenosis. Neck pain became more severe the morning of admission, possibly contributing to his pre-syncopal symptoms. Other pre-syncopal symptoms have resolved, but neck pain persists. Low suspicion for vascular etiology of neck pain, but should be ruled out.  - CTA head and neck to evaluate for possible vascular etiologies in setting of neck pain  - If CTA is negative, defer further work-up of neck pain to outpatient PCP       History of stroke  Patient had a thalamic stroke on 2/7/18 associated with a coronary catheterization procedure at [facility]. Per [facility] records, stroke was not embolic in nature, but was considered silas-procedural secondary to stenosis of left PCA. TPA was initiated but quickly aborted due to patient having had a recent hip revision. He was advised to start Eliquis due to his known atrial fibrillation, as well as aspirin. He has been compliant with his medications. As above, pre-syncopal episode could possibly be secondary to acute stroke, although unlikely.  - CTA of head and neck  - Continue prior to admission Eliquis and aspirin      CAD (coronary artery disease), S/P PTCA (percutaneous transluminal coronary angioplasty)  History of previous MI in 2007. Has 5 coronary stents. Recent coronary angio on 2/7/18 at [facility] showing patent previous  stents, no further intervention was required (see [facility] results from 2/7/18 for full details). As above, patient presented with pre-syncopal symptoms, which are possibly secondary to acute coronary syndrome (although low suspicion for this). EKG without ischemic changes, initial troponin negative.  - Monitor on telemetry  - Trend troponins  - Continue prior to admission Imdur, losartan, metoprolol, Zocor, and prn nitro      A-fib (H), s/p ablation  Previously diagnosed, s/p ablation in 2015. Rate controlled on metoprolol. Was restarted on Eliquis in 2/7/18 after his stroke due to high CHADS-VASc score, even though stroke not thought to be embolic in nature. EKG showing sinus rhythm with multiple ectopic beats.  - Continue prior to admission metoprolol and Eliquis      Benign essential hypertension  Previously diagnosed. Pressures stable. Taking amlodipine, Imdur, losartan, and metoprolol prior to admission, continue.    Diabetes mellitus type 2 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 (H)  Previously diagnosed, is controlled by diet and not on any medications. Presented with glucose 134. Most recent HgbA1c on 1/22/18 was 7.2.  - Recheck A1c  - Low dose sliding scale insulin while in the hospital  - Hypo/hyperglycemia protocol      Mixed hyperlipidemia   Previously diagnosed. Taking Zocor prior to admission, continue.    Overactive bladder / Benign non-nodular prostatic hyperplasia without lower urinary tract symptoms  Taking Ditropan prior to admission, continue.      Walls's esophagus without dysplasia  Previously diagnosed. Most recent EGD was July 2015, showing suspicion for Walls's esophagus. Taking omeprazole prior to admission, continue.    MAIDA (obstructive sleep apnea)  Uses CPAP at home, CPAP ordered.      Fluids: D5W 1/2 NS + 20 KCl @ 100  Electrolytes: Stable  Nutrition: Consistent carbohydrate    DVT Prophylaxis: Continue prior to admission Eliquis  Code Status: Full Code - discussed directly with  patient    Lines: Peripheral  Ordonez catheter: Not indicated    Disposition: Anticipate discharge in 1-2 day(s). Appropriate for observation care.    Christina Dale PA-C  [facility]  Pager: [phone]          Primary Care Physician   Thien Davison [phone]    History is obtained from the patient and review of old records via the EMR.    Past Medical History      Past Medical History:   Diagnosis Date     A-fib (H)      Arthritis      Chronic low back pain      Chronic pain      Coronary artery disease      Depression      Diabetes mellitus (H)     diet controlled     History of stroke      Hypertension        Past Surgical History     Past Surgical History:   Procedure Laterality Date     ARTHROPLASTY HIP  6/10/2011    Procedure:ARTHROPLASTY HIP; Minimally Invasive Surgery,J&J; Surgeon:KINA REICH; Location:[facility]     ARTHROPLASTY HIP  6/17/2013    Procedure: ARTHROPLASTY HIP;  Right Total Hip Arthroplasty;  Surgeon: Kina Reich MD;  Location: [facility]     ARTHROPLASTY REVISION HIP Right 1/19/2018    Procedure: ARTHROPLASTY REVISION HIP;  Right Hip Acetabulum Revision;  Surgeon: Kina Reich MD;  Location: [facility] OR     CARDIAC SURGERY      states he has 5 stents     COLONOSCOPY  3/28/2011    COLONOSCOPY performed by KYARA SIMMONS at [facility]     ESOPHAGOSCOPY, GASTROSCOPY, DUODENOSCOPY (EGD), COMBINED N/A 7/7/2015    Procedure: COMBINED ESOPHAGOSCOPY, GASTROSCOPY, DUODENOSCOPY (EGD), BIOPSY SINGLE OR MULTIPLE;  Surgeon: Jody Conner MD;  Location: [facility]     H ABLATION ATRIAL FLUTTER  7/15/14     SURGICAL HISTORY OF -       UPP for snoring     VASCULAR SURGERY       VASECTOMY  1970's        History of Present Illness   Bryson Mendez is a 77 year old male with the above past medical history now presents on 4/25/2018 with pre-syncopal symptoms and dislocation of right prosthetic hip. Patient has a complex past medical history including recent  "silas-procedural stroke that occurred on 2/7/18 during a routine cardiac angiogram at [facility] done to evaluate angina. Since his stroke he has had constant posterior neck pain, although this has not been definitively worked-up. Patient woke this morning stating \"I don't feel quite right,\" but couldn't identify specific symptoms other than having low energy and maybe some labored breathing. He didn't have much of an appetite for breakfast, but managed to eat a little bit. After breakfast he went with his wife for a short 15 minute walk. After returning home he developed a headache, worsening neck pain, diaphoresis, nausea, blurry vision, and generalized weakness. He attempted to use nitro without any relief of his symptoms. He needed to go to the bathroom, and after using the toilet attempted to stand but ended up falling and landing on his right hip. He was able to catch himself as he fell, and denies hitting his head, denies loss of consciousness, and denies syncope. He was unable to get up after the fall due to pain in his right hip, so EMS was called.    His typical neck pain is located posteriorly in the middle of his neck and radiates somewhat into the posterior skull, described as a constant tightness. However, today's neck pain was different, with radiation around the right side of his neck, around the anterior neck into the left shoulder and back up into the posterior left side of the neck. The pain was described as a constant tightness rated 6/10. He could not identify any alleviating or aggravating factors. As above, he attempted to take nitro without any relief of his neck pain or other symptoms.    Upon arrival to the emergency department, it was found that he had a dislocated right hip prosthesis on x-ray, but no fractures were identified. He was given MAC sedation and hip was reduced. He states his hip pain has fully resolved. All of his pre-syncopal symptoms have also resolved, with the " exception of his chronic neck pain. He denies any recent fever, chills, recent illness, associated chest pain, palpitations, leg swelling, cough, wheezing, abdominal pain, constipation, diarrhea, dysuria, skin problems, other pain, numbness, or tingling. He voices no further complaints or concerns at this time.        Prior to Admission Medications   Prior to Admission Medications   Prescriptions Last Dose Informant Patient Reported? Taking?   Cholecalciferol (VITAMIN D3 PO)  Self Yes No   Sig: Take 5,000 Units by mouth every 3 days    HYDROmorphone (DILAUDID) 2 MG tablet   No No   Sig: Take 1-2 tablets (2-4 mg) by mouth every 4 hours as needed for pain   Multiple Vitamins-Minerals (MULTIVITAL PO)  Self Yes No   Sig: Take 1 tablet by mouth 2 times daily    Multiple Vitamins-Minerals (PRESERVISION/LUTEIN PO)  Self Yes No   Sig: Take 1 capsule by mouth 2 times daily   OVER-THE-COUNTER  Self Yes No   Si tablets daily Tebs for eyes.   Omega-3 Fatty Acids (OMEGA 3 PO)  Self Yes No   Sig: Take 1 capsule by mouth 2 times daily    VIAGRA 100 MG tablet  Self Yes No   Sig: Take 100 mg by mouth daily as needed    acetaminophen (TYLENOL) 325 MG tablet   No No   Sig: Take 2 tablets (650 mg) by mouth every 4 hours as needed for other (surgical pain)   amlodipine (NORVASC) 5 MG tablet  Self Yes No   Sig: Take 5 mg by mouth daily.   isosorbide mononitrate (IMDUR) 30 MG 24 hr tablet   No No   Sig: Take 0.5 tablets (15 mg) by mouth daily   losartan (COZAAR) 50 MG tablet  Self Yes No   Sig: Take 50 mg by mouth daily.   meloxicam (MOBIC) 15 MG tablet  Self Yes No   Sig: Take 15 mg by mouth daily    metoprolol (LOPRESSOR) 100 MG tablet  Self Yes No   Sig: Take 1 tablet (100 mg) by mouth 2 times daily   nitroglycerin (NITROSTAT) 0.4 MG SL tablet  Self Yes No   Sig: Place 1 tablet (0.4 mg) under the tongue every 5 minutes as needed for chest pain   omeprazole (PRILOSEC) 40 MG capsule  Self Yes No   Sig: Take 40 mg by mouth daily   "  order for DME  Self Yes No   Sig: At Bedtime CPAP   order for DME   No No   Sig: Equipment being ordered: Walker Wheels () and Walker ()  Treatment Diagnosis: s/p R ROMAN   oxybutynin (DITROPAN-XL) 5 MG 24 hr tablet  Self Yes No   Sig: Take 10 mg by mouth daily   senna-docusate (SENOKOT-S;PERICOLACE) 8.6-50 MG per tablet   No No   Sig: Take 1 tablet by mouth 2 times daily as needed for constipation   simvastatin (ZOCOR) 20 MG tablet  Self Yes No   Sig: Take 20 mg by mouth At Bedtime.      Facility-Administered Medications: None     Allergies   Allergies   Allergen Reactions     Lisinopril      Other reaction(s): Breathing Difficulty, Fever, Redness       Family History    Family History   Problem Relation Age of Onset     Coronary Artery Disease Father      MI     Colon Cancer Mother      CANCER Brother      Rheumatoid Arthritis Sister      HEART DISEASE Sister        Social History   Social History     Social History     Marital status:      Spouse name: N/A     Number of children: N/A     Years of education: N/A     Occupational History     Not on file.     Social History Main Topics     Smoking status: Former Smoker     Quit date: 4/1/1971     Smokeless tobacco: Never Used     Alcohol use Yes      Comment: occasional - 2-3 drinks per month     Drug use: No     Sexual activity: Not on file     Other Topics Concern     Not on file     Social History Narrative    , lives with his wife.       Review of Systems     A complete 10 point review of systems was negative except for items noted in the HPI/subjective.      Physical Exam   /65  Pulse 52  Temp 97.8  F (36.6  C) (Oral)  Resp 16  Ht 1.778 m (5' 10\")  Wt 78.7 kg (173 lb 8 oz)  SpO2 97%  BMI 24.89 kg/m2     Weight: 173 lbs 8.03 oz Body mass index is 24.89 kg/(m^2).     Constitutional: Alert, oriented, cooperative, no apparent distress, appears nontoxic, speaking in full sentences.     Eyes: Eyes are clear, pupils are reactive. " No scleral icterus. Extroccular movements intact.     HEENT: Oropharynx is clear and moist, no lesions. Normocephalic, no evidence of cranial trauma.        Cardiovascular: Irregular rhythm, normal S1 and S2. No murmur, rubs, or gallops. Peripheral pulses in tact bilaterally. No lower extremity edema.     Respiratory: Lung sounds are clear to auscultation bilaterally without wheezes, rhonchi, or crackles.    GI: Soft, non-distended. Non-tender, no rebound or guarding. No hepatosplenomegaly or masses appreciated. Normal bowel sounds.    Musculoskeletal: Without obvious deformity, normal range of motion. Normal muscle bulk and tone.     Skin: Warm and dry, no rashes or ecchymoses. No mottling of skin.      Neurologic: Patient moves all extremities. Cranial nerves are grossly intact.  is symmetric. Gross strength and sensation are equal bilaterally.    Genitourinary: Deferred    Data   Data reviewed today:     Recent Labs  Lab 04/25/18  1410   WBC 9.7   HGB 15.3   MCV 88      INR 1.54*      POTASSIUM 4.2   CHLORIDE 111*   CO2 25   BUN 12   CR 0.85   ANIONGAP 6   CHERI 7.6*   *   ALBUMIN 2.9*   PROTTOTAL 6.0*   BILITOTAL 1.1   ALKPHOS 71   ALT 18   AST 29   TROPI <0.015       Recent Results (from the past 24 hour(s))   XR Pelvis Port 1/2 Views    Narrative    XR PELVIS PORT 1/2 VW 4/25/2018 2:50 PM    HISTORY: Pain. Fall.    COMPARISON: 1/22/2018.      Impression    IMPRESSION: The patient has bilateral hip prostheses in place. The  head of the femoral component of the right hip prosthesis is  dislocated superior with respect to the acetabular cup portion of the  right hip prosthesis.     BHARTI PAGE MD   Pelvis XR w/ unilateral hip right    Narrative    PELVIS AND HIP RIGHT ONE VIEW   4/25/2018 4:31 PM     HISTORY: post reduction.    COMPARISON: Earlier today.    FINDINGS: Multilevel lumbar spine degenerative change. Bilateral hip  arthroplasties.      Impression    IMPRESSION: Reduction of the  previously seen right hip arthroplasty  dislocation.    ROMEO PALMA MD       I personally reviewed the EKG tracing showing sinus rhythm with ectopy.    Christina Dale PA-C  [facility]  Pager: [phone]

## 2018-04-25 NOTE — ED NOTES
Bed: ED17  Expected date: 4/25/18  Expected time: 1:30 PM  Means of arrival: Ambulance  Comments:  Hip Pain

## 2018-04-25 NOTE — ED NOTES
Pt here via EMS from [facility] following a fall this a.m. C/o right hip pain. Right knee is internally rotated. Unable to bear weight.  EMS gave 100mics fentanyl IM. The patient has history of a-fib.

## 2018-04-25 NOTE — ED NOTES
"Patient has  [place] to Observation  order. Patient has been given the Observation brochure -  What does Observation mean to me.\"  Patient has been given the opportunity to ask questions about observation status and their plan of care.   Wife  Stated  She had a lot of these papers at home     Cristal Hemphill  "

## 2018-04-25 NOTE — IP AVS SNAPSHOT
[facility]    [address] [address]    Phone:  [phone]    Fax:  [phone]                                       After Visit Summary   4/25/2018    Bryson Mendez    MRN: [id number]           After Visit Summary Signature Page     I have received my discharge instructions, and my questions have been answered. I have discussed any challenges I see with this plan with the nurse or doctor.    ..........................................................................................................................................  Patient/Patient Representative Signature      ..........................................................................................................................................  Patient Representative Print Name and Relationship to Patient    ..................................................               ................................................  Date                                            Time    ..........................................................................................................................................  Reviewed by Signature/Title    ...................................................              ..............................................  Date                                                            Time

## 2018-04-25 NOTE — ANESTHESIA CARE TRANSFER NOTE
Patient: Bryson Mendez    * No procedures listed *    Diagnosis: * No pre-op diagnosis entered *  Diagnosis Additional Information: No value filed.    Anesthesia Type:   No value filed.     Note:  Airway :Nasal Cannula  Patient transferred to:Emergency Department  Handoff Report: Identifed the Patient, Identified the Reponsible Provider, Reviewed the pertinent medical history, Discussed the surgical course, Reviewed Intra-OP anesthesia mangement and issues during anesthesia, Set expectations for post-procedure period and Allowed opportunity for questions and acknowledgement of understanding      Vitals: (Last set prior to Anesthesia Care Transfer)              Electronically Signed By: ZOILA Serrano CRNA  April 25, 2018  3:23 PM

## 2018-04-25 NOTE — ANESTHESIA PREPROCEDURE EVALUATION
Anesthesia Evaluation     . Pt has had prior anesthetic. Type: General, MAC and Regional           ROS/MED HX    ENT/Pulmonary:     (+)sleep apnea, tobacco use, Past use uses CPAP , . .    Neurologic:     (+)CVA     Cardiovascular: Comment: Aortic congenital anomaly    (+) Dyslipidemia, hypertension--CAD, -past MI,-stent,. Taking blood thinners : . . . :. dysrhythmias a-fib, . Previous cardiac testing date:results:Stress Testdate:1-2018 results:Indication/Clinical History: Coronary artery disease and an abnormal  ECG     Impression  1. Myocardial perfusion imaging using single isotope technique  demonstrated nontransmural infarction of the anterior, anteroseptal,  and apical walls with mild residual ischemia involving the mid and  distal anterior wall.   2. Gated images demonstrated mild anterior and anteroseptal  hypokinesis with mild left ventricular chamber enlargement. End  diastolic volume was 145 cc. The left ventricular systolic function  is mildly reduced, with an ejection fraction measured at 45%.  3. I do not have reports of previous studies but there is an note  accompanying the stress test information indicating that the patient  had a previous study performed at [facility] in 2016 which showed  some reversibility within the lateral apex. I am not sure how that  compares with current study. Results discussed with Dr. Condon.ECG reviewed date:4-2018 results:Sinus Rhythm  -With run of ventricular ectopic beats   -Right bundle branch block with left axis -bifascicular block.     ABNORMAL    date: results:          METS/Exercise Tolerance:  1 - Eating, dressing   Hematologic:     (+) History of blood clots -      Musculoskeletal:   (+) arthritis, , , other musculoskeletal- chronic LBP      GI/Hepatic:     (+) GERD Other GI/Hepatic banegas's      Renal/Genitourinary:     (+) BPH,       Endo:     (+) type II DM .      Psychiatric:     (+) psychiatric history depression, anxiety and other (comment) (panic)       Infectious Disease:  - neg infectious disease ROS       Malignancy:      - no malignancy   Other:    (+) No chance of pregnancy C-spine cleared: N/A, H/O Chronic Pain,H/O chronic opiod use , no other significant disability                    Physical Exam  Normal systems: pulmonary and dental    Airway   Mallampati: II  TM distance: >3 FB  Neck ROM: full    Dental     Cardiovascular   Rhythm and rate: irregular and normal      Pulmonary                     Anesthesia Plan      History & Physical Review      ASA Status:  4 .    NPO Status:  > 6 hours    Plan for MAC with Etomidate induction. Reason for MAC:  Deep or markedly invasive procedure (G8)         Postoperative Care      Consents  Anesthetic plan, risks, benefits and alternatives discussed with:  Patient and Spouse..                          .

## 2018-04-25 NOTE — PLAN OF CARE
"Problem: Patient Care Overview  Goal: Plan of Care/Patient Progress Review  [place] [facility] [place]    Patient admitted to room 2404 at approximately 1810 via cart from emergency room. Patient was accompanied by transport tech.     Verbal SBAR report received from Alejandra THOMAS prior to patient arrival.     Patient trasferred to bed via air erma. Patient alert and oriented X 3. The patient is not having any pain. 0-10 Pain Scale: 7 (chronic neck pain). Admission vital signs: Blood pressure 136/65, pulse 52, temperature 97.8  F (36.6  C), temperature source Oral, resp. rate 16, height 1.778 m (5' 10\"), weight 78.7 kg (173 lb 8 oz), SpO2 97 %. Patient was oriented to plan of care, call light, bed controls, tv, telephone, bathroom and visiting hours.     Risk Assessment    The following safety risks were identified during admission: fall. Yellow risk band applied: YES.     Skin Initial Assessment    This writer admitted this patient and completed a full skin assessment and Inderjit score in the Adult PCS flowsheet. Appropriate interventions initiated as needed.    Skin  Inspection of bony prominences: Full  Inspection under devices: Full  Skin WDL:  WDL except, all  Skin Color/Characteristics: redness blanchable (bilateral elbows)  Skin Temperature: warm  Skin Moisture: dry (bilateral feet)  Skin Integrity: scar(s)    Inderjit Risk Assessment  Sensory Perception: 4-->no impairment  Moisture: 3-->occasionally moist  Activity: 3-->walks occasionally  Mobility: 3-->slightly limited  Nutrition: 3-->adequate  Friction and Shear: 3-->no apparent problem  Inderjit Score: 19  Bed Support Surface: Atmos Air mattress  Reassessed using Bed Algorithm: No    Shannan Hunt        "

## 2018-04-26 ENCOUNTER — APPOINTMENT (OUTPATIENT)
Dept: PHYSICAL THERAPY | Facility: CLINIC | Age: 78
End: 2018-04-26
Payer: MEDICARE

## 2018-04-26 ENCOUNTER — APPOINTMENT (OUTPATIENT)
Dept: OCCUPATIONAL THERAPY | Facility: CLINIC | Age: 78
End: 2018-04-26
Payer: MEDICARE

## 2018-04-26 VITALS
WEIGHT: 173.5 LBS | TEMPERATURE: 97.5 F | HEART RATE: 44 BPM | HEIGHT: 70 IN | SYSTOLIC BLOOD PRESSURE: 141 MMHG | BODY MASS INDEX: 24.84 KG/M2 | DIASTOLIC BLOOD PRESSURE: 57 MMHG | RESPIRATION RATE: 18 BRPM | OXYGEN SATURATION: 95 %

## 2018-04-26 LAB
ANION GAP SERPL CALCULATED.3IONS-SCNC: 5 MMOL/L (ref 3–14)
BUN SERPL-MCNC: 13 MG/DL (ref 7–30)
CALCIUM SERPL-MCNC: 8.3 MG/DL (ref 8.5–10.1)
CHLORIDE SERPL-SCNC: 106 MMOL/L (ref 94–109)
CO2 SERPL-SCNC: 26 MMOL/L (ref 20–32)
CREAT SERPL-MCNC: 0.93 MG/DL (ref 0.66–1.25)
ERYTHROCYTE [DISTWIDTH] IN BLOOD BY AUTOMATED COUNT: 14.4 % (ref 10–15)
GFR SERPL CREATININE-BSD FRML MDRD: 78 ML/MIN/1.7M2
GLUCOSE BLDC GLUCOMTR-MCNC: 151 MG/DL (ref 70–99)
GLUCOSE BLDC GLUCOMTR-MCNC: 157 MG/DL (ref 70–99)
GLUCOSE BLDC GLUCOMTR-MCNC: 181 MG/DL (ref 70–99)
GLUCOSE SERPL-MCNC: 147 MG/DL (ref 70–99)
HCT VFR BLD AUTO: 42 % (ref 40–53)
HGB BLD-MCNC: 14.3 G/DL (ref 13.3–17.7)
MCH RBC QN AUTO: 30.1 PG (ref 26.5–33)
MCHC RBC AUTO-ENTMCNC: 34 G/DL (ref 31.5–36.5)
MCV RBC AUTO: 88 FL (ref 78–100)
PLATELET # BLD AUTO: 178 10E9/L (ref 150–450)
POTASSIUM SERPL-SCNC: 3.9 MMOL/L (ref 3.4–5.3)
RBC # BLD AUTO: 4.75 10E12/L (ref 4.4–5.9)
SODIUM SERPL-SCNC: 137 MMOL/L (ref 133–144)
WBC # BLD AUTO: 11 10E9/L (ref 4–11)

## 2018-04-26 PROCEDURE — 25000132 ZZH RX MED GY IP 250 OP 250 PS 637: Mod: GY | Performed by: PHYSICIAN ASSISTANT

## 2018-04-26 PROCEDURE — 97110 THERAPEUTIC EXERCISES: CPT | Mod: GP | Performed by: PHYSICAL THERAPIST

## 2018-04-26 PROCEDURE — A9270 NON-COVERED ITEM OR SERVICE: HCPCS | Mod: GY | Performed by: PHYSICIAN ASSISTANT

## 2018-04-26 PROCEDURE — 97165 OT EVAL LOW COMPLEX 30 MIN: CPT | Mod: GO

## 2018-04-26 PROCEDURE — 97161 PT EVAL LOW COMPLEX 20 MIN: CPT | Mod: GP | Performed by: PHYSICAL THERAPIST

## 2018-04-26 PROCEDURE — 99217 ZZC OBSERVATION CARE DISCHARGE: CPT | Performed by: FAMILY MEDICINE

## 2018-04-26 PROCEDURE — 99207 ZZC CDG-CODE CATEGORY CHANGED: CPT | Performed by: FAMILY MEDICINE

## 2018-04-26 PROCEDURE — 00000146 ZZHCL STATISTIC GLUCOSE BY METER IP

## 2018-04-26 PROCEDURE — 97535 SELF CARE MNGMENT TRAINING: CPT | Mod: GO,XU

## 2018-04-26 PROCEDURE — 85027 COMPLETE CBC AUTOMATED: CPT | Performed by: PHYSICIAN ASSISTANT

## 2018-04-26 PROCEDURE — 80048 BASIC METABOLIC PNL TOTAL CA: CPT | Performed by: PHYSICIAN ASSISTANT

## 2018-04-26 PROCEDURE — 25800025 ZZH RX 258: Performed by: FAMILY MEDICINE

## 2018-04-26 PROCEDURE — G0378 HOSPITAL OBSERVATION PER HR: HCPCS

## 2018-04-26 PROCEDURE — 97116 GAIT TRAINING THERAPY: CPT | Mod: GP,XU | Performed by: PHYSICAL THERAPIST

## 2018-04-26 PROCEDURE — 40000193 ZZH STATISTIC PT WARD VISIT: Performed by: PHYSICAL THERAPIST

## 2018-04-26 PROCEDURE — A9270 NON-COVERED ITEM OR SERVICE: HCPCS | Mod: GY | Performed by: FAMILY MEDICINE

## 2018-04-26 PROCEDURE — 36415 COLL VENOUS BLD VENIPUNCTURE: CPT | Performed by: PHYSICIAN ASSISTANT

## 2018-04-26 PROCEDURE — 40000133 ZZH STATISTIC OT WARD VISIT

## 2018-04-26 PROCEDURE — 97530 THERAPEUTIC ACTIVITIES: CPT | Mod: GP | Performed by: PHYSICAL THERAPIST

## 2018-04-26 PROCEDURE — 96372 THER/PROPH/DIAG INJ SC/IM: CPT

## 2018-04-26 PROCEDURE — 25000132 ZZH RX MED GY IP 250 OP 250 PS 637: Mod: GY | Performed by: FAMILY MEDICINE

## 2018-04-26 RX ADMIN — POTASSIUM CHLORIDE, DEXTROSE MONOHYDRATE AND SODIUM CHLORIDE: 150; 5; 450 INJECTION, SOLUTION INTRAVENOUS at 01:51

## 2018-04-26 RX ADMIN — OXYBUTYNIN CHLORIDE 10 MG: 5 TABLET, EXTENDED RELEASE ORAL at 08:51

## 2018-04-26 RX ADMIN — LOSARTAN POTASSIUM 50 MG: 50 TABLET ORAL at 08:52

## 2018-04-26 RX ADMIN — ACETAMINOPHEN 650 MG: 325 TABLET, FILM COATED ORAL at 04:11

## 2018-04-26 RX ADMIN — POTASSIUM CHLORIDE, DEXTROSE MONOHYDRATE AND SODIUM CHLORIDE: 150; 5; 450 INJECTION, SOLUTION INTRAVENOUS at 11:24

## 2018-04-26 RX ADMIN — APIXABAN 5 MG: 5 TABLET, FILM COATED ORAL at 08:52

## 2018-04-26 RX ADMIN — OXYCODONE HYDROCHLORIDE 5 MG: 5 TABLET ORAL at 04:11

## 2018-04-26 RX ADMIN — ASPIRIN 81 MG 81 MG: 81 TABLET ORAL at 08:52

## 2018-04-26 RX ADMIN — METOPROLOL SUCCINATE 25 MG: 25 TABLET, EXTENDED RELEASE ORAL at 08:52

## 2018-04-26 RX ADMIN — OMEPRAZOLE 40 MG: 20 CAPSULE, DELAYED RELEASE ORAL at 08:52

## 2018-04-26 RX ADMIN — ISOSORBIDE MONONITRATE 15 MG: 30 TABLET, EXTENDED RELEASE ORAL at 08:51

## 2018-04-26 NOTE — PLAN OF CARE
Problem: PT General Care Plan  Goal: Gait (PT)  PT Gait   Outcome: Adequate for Discharge Date Met: 04/26/18  Discharge Planner PT   Patient plan for discharge: yes  Current status: independent in mobility and ambulation with RW  Barriers to return to prior living situation: none  Recommendations for discharge: home with wife  Rationale for recommendations: independent in mobility       Entered by: Neisha Espinosa 04/26/2018 1:32 PM

## 2018-04-26 NOTE — DISCHARGE SUMMARY
Admit Date:     04/25/2018   Discharge Date:           HISTORY OF PRESENT ILLNESS:  Bryson Mendez is a 77-year-old male with history of coronary artery disease, previous stenting, recent left thalamic stroke that occurred around the time of coronary angiography, atrial fibrillation status post ablation, chronic anticoagulation, hypertension, dyslipidemia, diabetes mellitus type 2, GERD, overactive bladder, and obstructive sleep apnea.  The patient has a complex past medical history including a silas-procedural stroke that occurred on 02/07/2018 during a cardiac angiogram at [facility] done to evaluate angina.  Since that stroke he has had pain at the base of his neck and upper back.  It seems musculoskeletal.  Apparently has a known bad cervical disk and has had some CT imaging of his neck in the past.  He woke up on the morning of admission and did not feel well.  He eventually developed some pain around the base of his neck that radiated anteriorly.  He went to the bathroom.  After using the toilet he got up, stood, and then had near-syncope, fell, landed on his right hip and dislocated this.  He came to the emergency room and the hip was relocated.      Upon admission, he had a normal troponin.  This has continued been normal.  His EKG showed a right bundle branch block and frequent PVCs which I believe is his baseline.  CTA of his head and neck was done in part because of some concern for dissection causing neck pain.  The chart review did not reveal any imaging of his carotids in the recent past.  The CTA was essentially unremarkable.      The patient was observed overnight.  The next morning he really felt back to normal.  His telemetry looked okay.  His troponins were all low.  He had no neurological symptoms.  It was felt that he probably had a vasovagal episode from his subacute neck pain that prompted this.      ASSESSMENT:   1.  Near syncopal episode, possibly vasovagal mediation related to pain.    2.  Right hip dislocation of arthroplasty done on 01/19/2018.   3.  Recent neck pain, etiology unclear, but it appears to be not musculoskeletal in nature.   4.  History of recent thalamic stroke on 02/17/2018 which occurred in association with a coronary catheterization at [facility].   5.  Coronary disease with 5 coronary stents.   6.  A-fib with previous ablation.   7.  Hypertension.   8.  Diabetes mellitus type 2.   9.  Hyperlipidemia.   10.  Overactive bladder.   12.  Walls's esophagus.   13.  Obstructive sleep apnea on CPAP.      PLAN:  He is going to discharge to home.  He should make an appointment to follow up with his primary doctor to evaluate the neck pain.  He should return if there are problems.  He should continue his regular medications.  Orthopedics has advised him to not have hip flexion beyond 90 degrees.  No adduction across the midline.  No hip internal rotation.  He is to avoid deep squatting, bending, or lifting.  He is to follow up with Dr. Reich in 1-2 weeks, sooner if he has problems.     Current Discharge Medication List      START taking these medications    Details   !! order for DME Equipment being ordered: walker with wheels.  Qty: 1 Units, Refills: 0       !! - Potential duplicate medications found. Please discuss with provider.      CONTINUE these medications which have NOT CHANGED    Details   acetaminophen (TYLENOL) 325 MG tablet Take 2 tablets (650 mg) by mouth every 4 hours as needed for other (surgical pain)  Qty: 100 tablet, Refills: 0    Associated Diagnoses: S/P revision of total hip      Apixaban (ELIQUIS PO) Take 5 mg by mouth 2 times daily       ASPIRIN ADULT LOW STRENGTH PO Take 81 mg by mouth daily      Cholecalciferol (VITAMIN D3 PO) Take 5,000 Units by mouth every 3 days       isosorbide mononitrate (IMDUR) 30 MG 24 hr tablet Take 0.5 tablets (15 mg) by mouth daily  Qty: 30 tablet, Refills: 0    Associated Diagnoses: Coronary artery disease involving native  heart with angina pectoris, unspecified vessel or lesion type (H)      losartan (COZAAR) 50 MG tablet Take 50 mg by mouth daily       metoprolol succinate (TOPROL-XL) 50 MG 24 hr tablet Take 25 mg by mouth daily       omeprazole (PRILOSEC) 40 MG capsule Take 40 mg by mouth daily       Multiple Vitamins-Minerals (MULTIVITAL PO) Take 1 tablet by mouth 2 times daily       Multiple Vitamins-Minerals (PRESERVISION/LUTEIN PO) Take 1 capsule by mouth 2 times daily      nitroglycerin (NITROSTAT) 0.4 MG SL tablet Place 0.4 mg under the tongue every 5 minutes as needed for chest pain   Qty: 25 tablet      Omega-3 Fatty Acids (OMEGA 3 PO) Take 1 capsule by mouth 2 times daily       !! order for DME At Bedtime CPAP      !! order for DME Equipment being ordered: Walker Wheels () and Walker ()  Treatment Diagnosis: s/p R ROMAN  Qty: 1 Units, Refills: 0    Associated Diagnoses: S/P revision of total hip      OVER-THE-COUNTER 2 tablets daily Tebs for eyes.      oxybutynin (DITROPAN-XL) 5 MG 24 hr tablet Take 10 mg by mouth daily       senna-docusate (SENOKOT-S;PERICOLACE) 8.6-50 MG per tablet Take 1 tablet by mouth 2 times daily as needed for constipation  Qty: 100 tablet, Refills: 0    Associated Diagnoses: S/P revision of total hip      simvastatin (ZOCOR) 20 MG tablet Take 20 mg by mouth At Bedtime       VIAGRA 100 MG tablet Take 100 mg by mouth daily as needed   Refills: 2       !! - Potential duplicate medications found. Please discuss with provider.        Unresulted Labs Ordered in the Past 30 Days of this Admission     No orders found for last 61 day(s).              Greater than 30 minutes spent on this.         ADAN PIEDRA MD             D: 2018   T: 2018   MT: STEF      Name:     CRIS ANDERSON   MRN:      [id number]        Account:        [id number]   :      1940           Admit Date:     2018                                  Discharge Date:       Document: [id number]       cc:  Thien Davison MD

## 2018-04-26 NOTE — PHARMACY - DISCHARGE MEDICATION RECONCILIATION
Discharge medication review for this patient is complete. Pharmacist assisted with medication reconciliation of discharge medications with prior to admission medications.     The following changes were made to the discharge medication list based on pharmacist review:  Added:  none  Discontinued: none  Changed: none      Patient's Discharge Medication List  - medications as listed on After Visit Summary (AVS)     Review of your medicines      CONTINUE these medicines which have NOT CHANGED       Dose / Directions    acetaminophen 325 MG tablet   Commonly known as:  TYLENOL   Used for:  S/P revision of total hip        Dose:  650 mg   Take 2 tablets (650 mg) by mouth every 4 hours as needed for other (surgical pain)   Quantity:  100 tablet   Refills:  0       ASPIRIN ADULT LOW STRENGTH PO        Dose:  81 mg   Take 81 mg by mouth daily   Refills:  0       ELIQUIS PO   Indication:  afib        Dose:  5 mg   Take 5 mg by mouth 2 times daily   Refills:  0       isosorbide mononitrate 30 MG 24 hr tablet   Commonly known as:  IMDUR   Used for:  Coronary artery disease involving native heart with angina pectoris, unspecified vessel or lesion type (H)        Dose:  15 mg   Take 0.5 tablets (15 mg) by mouth daily   Quantity:  30 tablet   Refills:  0       losartan 50 MG tablet   Commonly known as:  COZAAR   Indication:  High Blood Pressure Disorder        Dose:  50 mg   Take 50 mg by mouth daily   Refills:  0       metoprolol succinate 50 MG 24 hr tablet   Commonly known as:  TOPROL-XL   Indication:  High Blood Pressure Disorder, whethet it was succinate or tartrate, reinoso was decreased to 50 mg daily        Dose:  25 mg   Take 25 mg by mouth daily   Refills:  0       * PRESERVISION/LUTEIN PO        Dose:  1 capsule   Take 1 capsule by mouth 2 times daily   Refills:  0       * MULTIVITAL PO        Dose:  1 tablet   Take 1 tablet by mouth 2 times daily   Refills:  0       nitroGLYcerin 0.4 MG sublingual tablet   Commonly known  as:  NITROSTAT   Indication:  afib        Dose:  0.4 mg   Place 0.4 mg under the tongue every 5 minutes as needed for chest pain   Quantity:  25 tablet   Refills:  0       OMEGA 3 PO        Dose:  1 capsule   Take 1 capsule by mouth 2 times daily   Refills:  0       omeprazole 40 MG capsule   Commonly known as:  priLOSEC   Indication:  Gastroesophageal Reflux Disease        Dose:  40 mg   Take 40 mg by mouth daily   Refills:  0       * order for DME        At Bedtime CPAP   Refills:  0       * order for DME   Used for:  S/P revision of total hip        Equipment being ordered: Walker Wheels () and Walker () Treatment Diagnosis: s/p R ROMAN   Quantity:  1 Units   Refills:  0       OVER-THE-COUNTER        Dose:  2 tablet   2 tablets daily Tebs for eyes.   Refills:  0       oxybutynin 5 MG 24 hr tablet   Commonly known as:  DITROPAN-XL   Indication:  Frequent Urination        Dose:  10 mg   Take 10 mg by mouth daily   Refills:  0       senna-docusate 8.6-50 MG per tablet   Commonly known as:  SENOKOT-S;PERICOLACE   Used for:  S/P revision of total hip        Dose:  1 tablet   Take 1 tablet by mouth 2 times daily as needed for constipation   Quantity:  100 tablet   Refills:  0       VIAGRA 100 MG tablet   Indication:  Erectile Dysfunction   Generic drug:  sildenafil        Dose:  100 mg   Take 100 mg by mouth daily as needed   Refills:  2       VITAMIN D3 PO        Dose:  5000 Units   Take 5,000 Units by mouth every 3 days   Refills:  0       ZOCOR 20 MG tablet   Indication:  High Amount of Fats in the Blood   Generic drug:  simvastatin        Dose:  20 mg   Take 20 mg by mouth At Bedtime   Refills:  0       * Notice:  This list has 4 medication(s) that are the same as other medications prescribed for you. Read the directions carefully, and ask your doctor or other care provider to review them with you.

## 2018-04-26 NOTE — PROGRESS NOTES
[facility] Hospitalist Service      Subjective:  Feels back to normal  Has been ambulating    Review of Systems:  CONSTITUTIONAL: NEGATIVE for fever, chills, change in weight  ENT/MOUTH: NEGATIVE for ear, mouth and throat problems  RESP: NEGATIVE for significant cough or SOB  CV: NEGATIVE for chest pain, palpitations or peripheral edema    Physical Exam:  Vitals Were Reviewed    Patient Vitals for the past 16 hrs:   BP Temp Temp src Pulse Heart Rate Resp SpO2   04/26/18 0806 141/57 97.5  F (36.4  C) Oral - 60 18 95 %   04/26/18 0723 - - - - 62 - -   04/26/18 0000 - - - - 88 - -   04/25/18 2310 115/42 - - (!) 44 44 - 95 %   04/25/18 2306 - 98.8  F (37.1  C) Oral - - 18 -         Intake/Output Summary (Last 24 hours) at 04/26/18 1144  Last data filed at 04/26/18 1050   Gross per 24 hour   Intake              480 ml   Output             1025 ml   Net             -545 ml       GENERAL APPEARANCE: healthy, alert and no distress  EYES: conjunctiva clear, eyes grossly normal  RESP: lungs clear to auscultation - no rales, rhonchi or wheezes  CV: regular rate and rhythm, normal S1 S2, no S3 or S4 and no murmur, click or rub   ABDOMEN: soft, nontender, no HSM or masses and bowel sounds normal  MS: no clubbing, cyanosis; no edema  SKIN: clear without significant rashes or lesions    Lab:  Recent Labs   Lab Test  04/26/18   0525  04/25/18   1410   NA  137  142   POTASSIUM  3.9  4.2   CHLORIDE  106  111*   CO2  26  25   ANIONGAP  5  6   GLC  147*  134*   BUN  13  12   CR  0.93  0.85   CHERI  8.3*  7.6*     CBC RESULTS:   Recent Labs   Lab Test  04/26/18   0525  04/25/18   1410   WBC  11.0  9.7   RBC  4.75  5.03   HGB  14.3  15.3   HCT  42.0  44.4   PLT  178  168       Results for orders placed or performed during the hospital encounter of 04/25/18 (from the past 24 hour(s))   CBC with platelets differential   Result Value Ref Range    WBC 9.7 4.0 - 11.0 10e9/L    RBC Count 5.03 4.4 - 5.9 10e12/L    Hemoglobin 15.3 13.3 - 17.7  g/dL    Hematocrit 44.4 40.0 - 53.0 %    MCV 88 78 - 100 fl    MCH 30.4 26.5 - 33.0 pg    MCHC 34.5 31.5 - 36.5 g/dL    RDW 14.3 10.0 - 15.0 %    Platelet Count 168 150 - 450 10e9/L    Diff Method Automated Method     % Neutrophils 56.6 %    % Lymphocytes 31.0 %    % Monocytes 9.8 %    % Eosinophils 2.1 %    % Basophils 0.3 %    % Immature Granulocytes 0.2 %    Absolute Neutrophil 5.5 1.6 - 8.3 10e9/L    Absolute Lymphocytes 3.0 0.8 - 5.3 10e9/L    Absolute Monocytes 1.0 0.0 - 1.3 10e9/L    Absolute Eosinophils 0.2 0.0 - 0.7 10e9/L    Absolute Basophils 0.0 0.0 - 0.2 10e9/L    Abs Immature Granulocytes 0.0 0 - 0.4 10e9/L   INR   Result Value Ref Range    INR 1.54 (H) 0.86 - 1.14   Comprehensive metabolic panel   Result Value Ref Range    Sodium 142 133 - 144 mmol/L    Potassium 4.2 3.4 - 5.3 mmol/L    Chloride 111 (H) 94 - 109 mmol/L    Carbon Dioxide 25 20 - 32 mmol/L    Anion Gap 6 3 - 14 mmol/L    Glucose 134 (H) 70 - 99 mg/dL    Urea Nitrogen 12 7 - 30 mg/dL    Creatinine 0.85 0.66 - 1.25 mg/dL    GFR Estimate 87 >60 mL/min/1.7m2    GFR Estimate If Black >90 >60 mL/min/1.7m2    Calcium 7.6 (L) 8.5 - 10.1 mg/dL    Bilirubin Total 1.1 0.2 - 1.3 mg/dL    Albumin 2.9 (L) 3.4 - 5.0 g/dL    Protein Total 6.0 (L) 6.8 - 8.8 g/dL    Alkaline Phosphatase 71 40 - 150 U/L    ALT 18 0 - 70 U/L    AST 29 0 - 45 U/L   Troponin I   Result Value Ref Range    Troponin I ES <0.015 0.000 - 0.045 ug/L   Nt probnp inpatient (BNP)   Result Value Ref Range    N-Terminal Pro BNP Inpatient 1494 0 - 1800 pg/mL   Hemoglobin A1c   Result Value Ref Range    Hemoglobin A1C 6.2 0 - 6.4 %   XR Pelvis Port 1/2 Views    Narrative    XR PELVIS PORT 1/2 VW 4/25/2018 2:50 PM    HISTORY: Pain. Fall.    COMPARISON: 1/22/2018.      Impression    IMPRESSION: The patient has bilateral hip prostheses in place. The  head of the femoral component of the right hip prosthesis is  dislocated superior with respect to the acetabular cup portion of the  right hip  prosthesis.     BHARTI PAGE MD   Pelvis XR w/ unilateral hip right    Narrative    PELVIS AND HIP RIGHT ONE VIEW   4/25/2018 4:31 PM     HISTORY: post reduction.    COMPARISON: Earlier today.    FINDINGS: Multilevel lumbar spine degenerative change. Bilateral hip  arthroplasties.      Impression    IMPRESSION: Reduction of the previously seen right hip arthroplasty  dislocation.    ROMEO PALMA MD   CT Head Neck Angio w/o & w Contrast    Narrative    CTA ANGIOGRAM HEAD AND NECK 4/25/2018 8:33 PM     HISTORY: Pre-syncope, chronic posterior neck pain.    TECHNIQUE: Axial images were obtained through the head and neck  without and with intravenous contrast. 70 mL of Isovue-370 was given.  Multiplanar reconstructions were performed. 3-D reconstructions off a  remote workstation for CT angiography were also acquired. Carotid  stenoses were evaluated by comparing the caliber of the proximal  internal carotid artery to the caliber of the distal internal carotid  artery. Radiation dose for this scan was reduced using automated  exposure control, adjustment of the mA and/or kV according to patient  size, or iterative reconstruction technique.    FINDINGS:    Brachiocephalic vessels: There is some moderate calcific plaque off  the thoracic arch and some minimal calcific plaque is seen in both  subclavian arteries, but there is no stenosis.    Right carotid system: Minimal calcific plaque. No evidence for  stenosis or dissection.    Left carotid system: Mild calcified and noncalcified plaque. No  evidence for stenosis or dissection.    Right vertebral artery: Normal.    Left vertebral artery: Normal.    [place]: There is atherosclerotic disease involving the  carotid siphon regions, but no significant stenosis. [place]  is otherwise normal. Incidental note is made of origin of both  posterior cerebral arteries from the internal carotid arteries which  is an anatomic variant.    Other findings: Postcontrast  images through the brain do not show any  abnormal areas of enhancement. Images through the neck show  degenerative changes in the spine.      Impression    IMPRESSION:  1. Mild atherosclerotic disease involving the carotid bifurcations,  carotid siphon regions, and proximal subclavian arteries without  stenosis.  2. Normal vertebral arteries. Incidental note is made of direct origin  of both posterior cerebral arteries from the internal carotid arteries  which is an anatomic variant.  3. No evidence for stenosis, dissection, aneurysm, thromboembolism.    CHRISTA QUINTANILLA MD   UA reflex to Microscopic and Culture   Result Value Ref Range    Color Urine Yellow     Appearance Urine Clear     Glucose Urine 50 (A) NEG^Negative mg/dL    Bilirubin Urine Negative NEG^Negative    Ketones Urine 5 (A) NEG^Negative mg/dL    Specific Gravity Urine 1.028 1.003 - 1.035    Blood Urine Negative NEG^Negative    pH Urine 5.0 5.0 - 7.0 pH    Protein Albumin Urine Negative NEG^Negative mg/dL    Urobilinogen mg/dL 2.0 0.0 - 2.0 mg/dL    Nitrite Urine Negative NEG^Negative    Leukocyte Esterase Urine Negative NEG^Negative    Source Midstream Urine     RBC Urine 1 0 - 2 /HPF    WBC Urine 1 0 - 5 /HPF    Mucous Urine Present (A) NEG^Negative /LPF    Hyaline Casts 8 (H) 0 - 2 /LPF   Glucose by meter   Result Value Ref Range    Glucose 138 (H) 70 - 99 mg/dL   Troponin I - Now then in 4 hours x 2   Result Value Ref Range    Troponin I ES <0.015 0.000 - 0.045 ug/L   Glucose by meter   Result Value Ref Range    Glucose 151 (H) 70 - 99 mg/dL   CBC with platelets   Result Value Ref Range    WBC 11.0 4.0 - 11.0 10e9/L    RBC Count 4.75 4.4 - 5.9 10e12/L    Hemoglobin 14.3 13.3 - 17.7 g/dL    Hematocrit 42.0 40.0 - 53.0 %    MCV 88 78 - 100 fl    MCH 30.1 26.5 - 33.0 pg    MCHC 34.0 31.5 - 36.5 g/dL    RDW 14.4 10.0 - 15.0 %    Platelet Count 178 150 - 450 10e9/L   Basic metabolic panel   Result Value Ref Range    Sodium 137 133 - 144 mmol/L     "Potassium 3.9 3.4 - 5.3 mmol/L    Chloride 106 94 - 109 mmol/L    Carbon Dioxide 26 20 - 32 mmol/L    Anion Gap 5 3 - 14 mmol/L    Glucose 147 (H) 70 - 99 mg/dL    Urea Nitrogen 13 7 - 30 mg/dL    Creatinine 0.93 0.66 - 1.25 mg/dL    GFR Estimate 78 >60 mL/min/1.7m2    GFR Estimate If Black >90 >60 mL/min/1.7m2    Calcium 8.3 (L) 8.5 - 10.1 mg/dL   Glucose by meter   Result Value Ref Range    Glucose 157 (H) 70 - 99 mg/dL   Glucose by meter   Result Value Ref Range    Glucose 181 (H) 70 - 99 mg/dL       Assessment and Plan:    Bryson Mendez is a 77 year old male with a past medical history significant for coronary artery disease s/p PCI x 5, left thalamic stroke, atrial fibrillation s/p ablation on Eliquis, hypertension, dyslipidemia, type 2 diabetes mellitus (diet controlled), gastroesophageal reflux disease, overactive bladder, and obstructive sleep apnea on CPAP who presents on 4/25/2018 with a pre-syncopal event as well as right prosthetic hip dislocation secondary to fall.        Pre-syncope  Woke on 4/25 stating \"I just don't feel right.\" Went for a short walk and after returning home developed neck pain and pre-syncopal symptoms including diaphoresis, headache, \"tunnel vision,\" generalized weakness, and nausea. Went to the bathroom and attempted to stand, which resulted in fall (see below). Patient denies true syncope, loss of consciousness, or head trauma. Unclear etiology of pre-syncopal episode - differential includes pain mediated, vasovagal, neurologic or vascular, hypoglycemia, hypotension, heart arrhythmia, dehydration. EKG showing sinus rhythm with ectopy and right bundle branch block (all noted on previous EKGs). Initial troponin negative. Vital signs stable. No evidence for infection.  April 26, 2018 CTA relatively unrevealing  trops-all less than 0.015, sinus rhythm rbbb with pvc's      Fall, Displacement of internal right hip prosthesis (H)  Patient with revision of right hip on 1/19/18 at " [facility]. Patient fell in association with pre-syncopal episode - he attributes fall to weakness. Was able to catch himself during fall. Denies syncope, head trauma, loss of consciousness. Landed on right hip and felt immediate pain. Pelvis x-ray reveals dislocation of head of femoral component of right hip prosthesis. Patient was sedated with MAC sedation in the emergency department and dislocation was reduced and confirm by repeat x-ray. Case was discussed between emergency department and ortho providers.  - Ortho consult for recommendations of possible bracing and management of prosthetic hip dislocation      Neck pain  Patient states that his neck pain has been chronic in nature since his stroke in Feb 2018. Cervical CT from 2017 (prior to stroke) showing multilevel degenerative disease and spinal stenosis. Neck pain became more severe the morning of admission, possibly contributing to his pre-syncopal symptoms. Other pre-syncopal symptoms have resolved, but neck pain persists. Low suspicion for vascular etiology of neck pain, but should be ruled out.  - CTA head and neck to evaluate for possible vascular etiologies in setting of neck pain  - If CTA is negative, defer further work-up of neck pain to outpatient PCP       History of stroke  Patient had a thalamic stroke on 2/7/18 associated with a coronary catheterization procedure at [facility]. Per [facility] records, stroke was not embolic in nature, but was considered silas-procedural secondary to stenosis of left PCA. TPA was initiated but quickly aborted due to patient having had a recent hip revision. He was advised to start Eliquis due to his known atrial fibrillation, as well as aspirin. He has been compliant with his medications. As above, pre-syncopal episode could possibly be secondary to acute stroke, although unlikely.  - CTA of head and neck  - Continue prior to admission Eliquis and aspirin        CAD (coronary artery disease), S/P  PTCA (percutaneous transluminal coronary angioplasty)  History of previous MI in 2007. Has 5 coronary stents. Recent coronary angio on 2/7/18 at [facility] showing patent previous stents, no further intervention was required (see [facility] results from 2/7/18 for full details). As above, patient presented with pre-syncopal symptoms, which are possibly secondary to acute coronary syndrome (although low suspicion for this). EKG without ischemic changes, initial troponin negative.  - Monitor on telemetry  - Trend troponins  - Continue prior to admission Imdur, losartan, metoprolol, Zocor, and prn nitro        A-fib (H), s/p ablation  Previously diagnosed, s/p ablation in 2015. Rate controlled on metoprolol. Was restarted on Eliquis in 2/7/18 after his stroke due to high CHADS-VASc score, even though stroke not thought to be embolic in nature. EKG showing sinus rhythm with multiple ectopic beats.  - Continue prior to admission metoprolol and Eliquis        Benign essential hypertension  Previously diagnosed. Pressures stable. Taking amlodipine, Imdur, losartan, and metoprolol prior to admission, continue.     Diabetes mellitus type 2 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 (H)  Previously diagnosed, is controlled by diet and not on any medications. Presented with glucose 134. Most recent HgbA1c on 1/22/18 was 7.2.  - Recheck A1c  - Low dose sliding scale insulin while in the hospital  - Hypo/hyperglycemia protocol        Mixed hyperlipidemia   Previously diagnosed. Taking Zocor prior to admission, continue.     Overactive bladder / Benign non-nodular prostatic hyperplasia without lower urinary tract symptoms  Taking Ditropan prior to admission, continue.        Walls's esophagus without dysplasia  Previously diagnosed. Most recent EGD was July 2015, showing suspicion for Walls's esophagus. Taking omeprazole prior to admission, continue.     MAIDA (obstructive sleep apnea)  Uses CPAP at home, CPAP ordered.        Fluids: D5W 1/2  NS + 20 KCl @ 100  Electrolytes: Stable  Nutrition: Consistent carbohydrate     DVT Prophylaxis: Continue prior to admission Eliquis  Code Status: Full Code - discussed directly with patient     Lines: Peripheral  Ordonez catheter: Not indicated     Disposition  Home

## 2018-04-26 NOTE — CONSULTS
[facility] Consultation    Consultation - [facility]  Level of consult: One-time consult to assist in determining a diagnosis and to recommend an appropriate treatment plan    Bryson Anderson,  1940, MRN [id number]     Admitting Dx: Near syncope [R55]  Hip dislocation, right, initial encounter (H) [S73.004A]     PCP: Thien Davison, [phone]     Code status:  Full Code     Extended Emergency Contact Information  Primary Emergency Contact: MICHAEL ANDERSON  Address: [id number] [place] [id number] [facility]  Home Phone: [phone]  Work Phone: none  Mobile Phone: [phone]  Relation: Spouse     Assessment:  R hip dislocation secondary to fall from presumed pre-syncopal event. Hip relocation in ED as seen on plain films.     Plan:  Patient may be WBAT and should continue to follow posterior hip precautions x 6 weeks or until cleared by Dr. David Reich. This includes no hip flexion beyond 90 degrees, no adduction across midline, no hip internal rotation. Avoid deep squatting, bending or lifting. Given this dislocation is secondary to trauma vs atraumatic dislocation, the hardware is likely stable without further bracing. Recommend follow up with Dr. Reich in 1-2weeks for general follow up, sooner if pain worsens. Patient's disposition will be determined by his work up for the pre-syncopal event, however he may discharge to the appropriate setting when medically stable.     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 States he was in the bathroom and on the toilet when he started \"not feeling right\" at this time, he denies any hip pain. He states he was able to get up and take two steps before falling. After falling he noted that his R hip wouldn't move as he crawled into the hallway from the bathroom.      History is obtained from the patient     Past Medical History  Past Medical History:   Diagnosis Date     A-fib (H)      Arthritis      Chronic low back pain      Chronic pain      Coronary artery disease      Depression      Diabetes mellitus (H)     diet controlled     History of stroke      Hypertension        Surgical History  Past Surgical History:   Procedure Laterality Date     ARTHROPLASTY HIP  6/10/2011    Procedure:ARTHROPLASTY HIP; Minimally Invasive Surgery,J&J; Surgeon:KINA REICH; Location:[facility]     ARTHROPLASTY HIP  6/17/2013    Procedure: ARTHROPLASTY HIP;  Right Total Hip Arthroplasty;  Surgeon: Kina Reich MD;  Location: [facility]     ARTHROPLASTY REVISION HIP Right 1/19/2018    Procedure: ARTHROPLASTY REVISION HIP;  Right Hip Acetabulum Revision;  Surgeon: Kina Reich MD;  Location: [facility]     CARDIAC SURGERY      states he has 5 stents     COLONOSCOPY  3/28/2011    COLONOSCOPY performed by KYARA SIMMONS at [facility]     ESOPHAGOSCOPY, GASTROSCOPY, DUODENOSCOPY (EGD), COMBINED N/A 7/7/2015    Procedure: COMBINED ESOPHAGOSCOPY, GASTROSCOPY, DUODENOSCOPY (EGD), BIOPSY SINGLE OR MULTIPLE;  Surgeon: Jody Conner MD;  Location: [facility]     H ABLATION ATRIAL FLUTTER  7/15/14     SURGICAL HISTORY OF - "       UPP for snoring     VASCULAR SURGERY       VASECTOMY  1970's        Social History  Social History     Social History     Marital status:      Spouse name: N/A     Number of children: N/A     Years of education: N/A     Occupational History     Not on file.     Social History Main Topics     Smoking status: Former Smoker     Quit date: 4/1/1971     Smokeless tobacco: Never Used     Alcohol use Yes      Comment: occasional - 2-3 drinks per month     Drug use: No     Sexual activity: Not on file     Other Topics Concern     Not on file     Social History Narrative    , lives with his wife.       Family History  Family History   Problem Relation Age of Onset     Coronary Artery Disease Father      MI     Colon Cancer Mother      CANCER Brother      Rheumatoid Arthritis Sister      HEART DISEASE Sister         Allergies:  Lisinopril      Current Medications:  Current Facility-Administered Medications   Medication     acetaminophen (TYLENOL) Suppository 650 mg     acetaminophen (TYLENOL) tablet 650 mg     apixaban ANTICOAGULANT (ELIQUIS) tablet 5 mg     aspirin chewable tablet 81 mg     dextrose 5% and 0.45% NaCl + KCl 20 mEq/L infusion     glucose gel 15-30 g    Or     dextrose 50 % injection 25-50 mL    Or     glucagon injection 1 mg     insulin aspart (NovoLOG) inj (RAPID ACTING)     insulin aspart (NovoLOG) inj (RAPID ACTING)     isosorbide mononitrate (IMDUR) 24 hr half-tab 15 mg     lidocaine (LMX4) kit     lidocaine 1 % 1 mL     losartan (COZAAR) tablet 50 mg     metoclopramide (REGLAN) tablet 5 mg    Or     metoclopramide (REGLAN) injection 5 mg     metoprolol succinate (TOPROL-XL) 24 hr tablet 25 mg     naloxone (NARCAN) injection 0.1-0.4 mg     nitroGLYcerin (NITROSTAT) sublingual tablet 0.4 mg     omeprazole (priLOSEC) CR capsule 40 mg     ondansetron (ZOFRAN-ODT) ODT tab 4 mg    Or     ondansetron (ZOFRAN) injection 4 mg     oxybutynin (DITROPAN-XL) 24 hr tablet 10 mg     oxyCODONE IR  (ROXICODONE) tablet 5-10 mg     prochlorperazine (COMPAZINE) injection 5 mg    Or     prochlorperazine (COMPAZINE) tablet 5 mg    Or     prochlorperazine (COMPAZINE) Suppository 12.5 mg     senna-docusate (SENOKOT-S;PERICOLACE) 8.6-50 MG per tablet 1 tablet     simvastatin (ZOCOR) tablet 20 mg     sodium chloride (PF) 0.9% PF flush 3 mL     sodium chloride (PF) 0.9% PF flush 3 mL       Review of Systems:  The Review of Systems is negative other than noted in the HPI    Physical Exam:  Temp:  [97.5  F (36.4  C)-98.8  F (37.1  C)] 97.5  F (36.4  C)  Pulse:  [44-52] 44  Heart Rate:  [] 60  Resp:  [6-22] 18  BP: ()/(42-91) 141/57  SpO2:  [88 %-100 %] 95 %    Gen: alert and orientated sitting upright  MSK: R hip: no obvious deformity. Distal neuro intact. PF/DF EHL intact. Able to perform straight leg raise. Tolerates gentle PROM of IR/ER while leg is out straight.     Pertinent Labs  Lab Results: personally reviewed.  Lab Results   Component Value Date    WBC 11.0 04/26/2018    HGB 14.3 04/26/2018    HCT 42.0 04/26/2018    MCV 88 04/26/2018     04/26/2018       Recent Labs  Lab 04/25/18  1410   INR 1.54*       Pertinent Radiology  Radiology Results: images and radiology report reviewed  Recent Results (from the past 24 hour(s))   XR Pelvis Port 1/2 Views    Narrative    XR PELVIS PORT 1/2 VW 4/25/2018 2:50 PM    HISTORY: Pain. Fall.    COMPARISON: 1/22/2018.      Impression    IMPRESSION: The patient has bilateral hip prostheses in place. The  head of the femoral component of the right hip prosthesis is  dislocated superior with respect to the acetabular cup portion of the  right hip prosthesis.     BHARTI PAGE MD   Pelvis XR w/ unilateral hip right    Narrative    PELVIS AND HIP RIGHT ONE VIEW   4/25/2018 4:31 PM     HISTORY: post reduction.    COMPARISON: Earlier today.    FINDINGS: Multilevel lumbar spine degenerative change. Bilateral hip  arthroplasties.      Impression    IMPRESSION: Reduction  of the previously seen right hip arthroplasty  dislocation.    ROMEO PALMA MD   CT Head Neck Angio w/o & w Contrast    Narrative    CTA ANGIOGRAM HEAD AND NECK 4/25/2018 8:33 PM     HISTORY: Pre-syncope, chronic posterior neck pain.    TECHNIQUE: Axial images were obtained through the head and neck  without and with intravenous contrast. 70 mL of Isovue-370 was given.  Multiplanar reconstructions were performed. 3-D reconstructions off a  remote workstation for CT angiography were also acquired. Carotid  stenoses were evaluated by comparing the caliber of the proximal  internal carotid artery to the caliber of the distal internal carotid  artery. Radiation dose for this scan was reduced using automated  exposure control, adjustment of the mA and/or kV according to patient  size, or iterative reconstruction technique.    FINDINGS:    Brachiocephalic vessels: There is some moderate calcific plaque off  the thoracic arch and some minimal calcific plaque is seen in both  subclavian arteries, but there is no stenosis.    Right carotid system: Minimal calcific plaque. No evidence for  stenosis or dissection.    Left carotid system: Mild calcified and noncalcified plaque. No  evidence for stenosis or dissection.    Right vertebral artery: Normal.    Left vertebral artery: Normal.    [place]: There is atherosclerotic disease involving the  carotid siphon regions, but no significant stenosis. [place]  is otherwise normal. Incidental note is made of origin of both  posterior cerebral arteries from the internal carotid arteries which  is an anatomic variant.    Other findings: Postcontrast images through the brain do not show any  abnormal areas of enhancement. Images through the neck show  degenerative changes in the spine.      Impression    IMPRESSION:  1. Mild atherosclerotic disease involving the carotid bifurcations,  carotid siphon regions, and proximal subclavian arteries without  stenosis.  2. Normal  vertebral arteries. Incidental note is made of direct origin  of both posterior cerebral arteries from the internal carotid arteries  which is an anatomic variant.  3. No evidence for stenosis, dissection, aneurysm, thromboembolism.    CHRISTA QUINTANILLA MD       Attestation:  I have reviewed today's vital signs, notes, medications, labs and imaging.  Amount of time performed on this consult: 30 minutes.     Ene Eugene

## 2018-04-26 NOTE — PLAN OF CARE
Problem: Pain, Acute (Adult)  Goal: Identify Related Risk Factors and Signs and Symptoms  Related risk factors and signs and symptoms are identified upon initiation of Human Response Clinical Practice Guideline (CPG).   Outcome: Improving  Patient alert and oriented, can be impulsive. Attempted to get out of bed without calling for help. Patient was kept on bedrest overnight because we were unsure of his weight bearing status until after he sees ortho. Patient given 5 mg Oxycodone and Tylenol for pain control. Pain in neck more then in hip. Patient was up most of the night, when he did fall asleep cpap from home was put on. Patient continent of urine, using urinal in bed with help of staff.

## 2018-04-26 NOTE — PROGRESS NOTES
04/26/18 1200   Quick Adds   Type of Visit Initial Occupational Therapy Evaluation   Living Environment   Lives With spouse;parent(s)  (wife and mother-in-law)   Living Arrangements house   Home Accessibility stairs to enter home   Number of Stairs to Enter Home 4   Number of Stairs Within Home 0   Stair Railings at Home outside, present at both sides   Living Environment Comment walk-in shower with shower chair.    Self-Care   Equipment Currently Used at Home dressing device;raised toilet;shower chair   Functional Level Prior   Ambulation 0-->independent   Transferring 0-->independent   Toileting 0-->independent   Bathing 0-->independent   Dressing 2-->assistive person  (wife has been helping with socks and pants/underwear)   Eating 0-->independent   Communication 0-->understands/communicates without difficulty   Swallowing 0-->swallows foods/liquids without difficulty   Cognition 0 - no cognition issues reported   Fall history within last six months yes   Number of times patient has fallen within last six months 2  (pre-syncopal event prior to admission)   Which of the above functional risks had a recent onset or change? fall history   General Information   Onset of Illness/Injury or Date of Surgery - Date 04/25/18   Referring Physician Christina Dale PA-C   Patient/Family Goals Statement To return home and in 2 weeks go on vacation to [place]   Additional Occupational Profile Info/Pertinent History of Current Problem Bryson Mendez is a 77 year old male with a past medical history significant for coronary artery disease s/p PCI x 5, left thalamic stroke, atrial fibrillation s/p ablation on Eliquis, hypertension, dyslipidemia, type 2 diabetes mellitus (diet controlled), gastroesophageal reflux disease, overactive bladder, and obstructive sleep apnea on CPAP who presents on 4/25/2018 with a pre-syncopal event as well as right prosthetic hip dislocation secondary to fall. reduced in ED. Memory deficit from  stroke.    Precautions/Limitations right hip precautions   Weight-Bearing Status - RLE weight-bearing as tolerated   Cognitive Status Examination   Orientation orientation to person, place and time   Pain Assessment   Patient Currently in Pain Yes, see Vital Sign flowsheet   Transfer Skill: Sit to Stand   Level of Miner: Sit/Stand independent   Physical Assist/Nonphysical Assist: Sit/Stand supervision   Transfer Skill: Sit to Stand weight-bearing as tolerated   Assistive Device for Transfer: Sit/Stand standard walker   Transfer Skill: Toilet Transfer   Level of Miner: Toilet independent   Physical Assist/Nonphysical Assist: Toilet supervision   Weight-Bearing Restrictions: Toilet weight-bearing as tolerated   Assistive Device standard walker;grab bars   Upper Body Dressing   Level of Miner: Dress Upper Body independent   Lower Body Dressing   Level of Miner: Dress Lower Body minimum assist (75% patients effort)   Physical Assist/Nonphysical Assist: Dress Lower Body 1 person assist   Instrumental Activities of Daily Living (IADL)   IADL Comments Wife assists with IADLs d/t pt's memory deficits. .    Activities of Daily Living Analysis   Impairments Contributing to Impaired Activities of Daily Living post surgical precautions   General Therapy Interventions   Planned Therapy Interventions ADL retraining   Clinical Impression   Criteria for Skilled Therapeutic Interventions Met yes, treatment indicated   OT Diagnosis decreased independence with ADLs.    Influenced by the following impairments hip precautions   Assessment of Occupational Performance 1-3 Performance Deficits   Identified Performance Deficits LB dressing   Clinical Decision Making (Complexity) Low complexity   Therapy Frequency daily   Predicted Duration of Therapy Intervention (days/wks) 1x treat   Anticipated Discharge Disposition Home with Assist   Risks and Benefits of Treatment have been explained. Yes   Patient, Family &  "other staff in agreement with plan of care Yes   Clinical Impression Comments 1x treatment to review hip precautions within ADLs and review how to use AE for lower body dressing.    [facility] AM-PAC  \"6 Clicks\" Daily Activity Inpatient Short Form   1. Putting on and taking off regular lower body clothing? 3 - A Little   2. Bathing (including washing, rinsing, drying)? 3 - A Little   3. Toileting, which includes using toilet, bedpan or urinal? 4 - None   4. Putting on and taking off regular upper body clothing? 4 - None   5. Taking care of personal grooming such as brushing teeth? 4 - None   6. Eating meals? 4 - None   Daily Activity Raw Score (Score out of 24.Lower scores equate to lower levels of function) 22   Total Evaluation Time   Total Evaluation Time (Minutes) 6     "

## 2018-04-26 NOTE — DISCHARGE INSTRUCTIONS
Follow up with your regular doctor within one week.  Return if more problems.  No hip flexion beyond 90 degrees, no adduction across midline, no hip internal rotation. Avoid deep squatting, bending or lifting. Given this dislocation is secondary to trauma vs atraumatic dislocation, the hardware is likely stable without further bracing. Recommend follow up with Dr. Reich in 1-2weeks for general follow up, sooner if pain worsens. Call [phone]

## 2018-04-26 NOTE — PLAN OF CARE
Problem: Patient Care Overview  Goal: Plan of Care/Patient Progress Review  Discharge Planner OT   Patient plan for discharge: Home with spouse    Current status: 1 time treatment session to review/re-educate pt on ADLs within hip precautions. Pt with memory impairment from previous stroke- provided hip precautions and ADLs within hip precautions handouts. Pt SBA-independent with ADLs.     Barriers to return to prior living situation: recall of hip precautions (provided handout and pt's wife can assist with cues).     Recommendations for discharge: Home with spouse     Rationale for recommendations: Doing well. SBA- independent with ADLs.     Occupational Therapy Discharge Summary    Reason for therapy discharge:    All goals and outcomes met, no further needs identified.    Progress towards therapy goal(s). See goals on Care Plan in Epic electronic health record for goal details.  Goals met    Therapy recommendation(s):    No further OT recommended.            Entered by: Graec Randall 04/26/2018 12:23 PM

## 2018-04-26 NOTE — PROGRESS NOTES
Bryson Mendez  1940 04/26/18 1000   Quick Adds   Type of Visit Initial PT Evaluation   Living Environment   Lives With spouse;parent(s)   Living Arrangements house   Home Accessibility stairs to enter home   Number of Stairs to Enter Home 4   Number of Stairs Within Home 0   Stair Railings at Home outside, present at both sides   Functional Level Prior   Ambulation 0-->independent   Transferring 0-->independent   Toileting 0-->independent   Bathing 0-->independent   Dressing 0-->independent   Eating 0-->independent   Communication 0-->understands/communicates without difficulty   Swallowing 0-->swallows foods/liquids without difficulty   Fall history within last six months yes   Number of times patient has fallen within last six months 2  (pre syncopal event prior to admit)   General Information   Onset of Illness/Injury or Date of Surgery - Date 04/25/18   Patient/Family Goals Statement get back to home and independent mobility   Pertinent History of Current Problem (include personal factors and/or comorbidities that impact the POC) 77 year old male with a past medical history significant for coronary artery disease s/p PCI x 5, left thalamic stroke 2/7/2018, atrial fibrillation s/p ablation on Eliquis, hypertension, dyslipidemia, type 2 diabetes mellitus (diet controlled), gastroesophageal reflux disease, overactive bladder, and obstructive sleep apnea on CPAP who presents on 4/25/2018 with a pre-syncopal event as well as right prosthetic hip dislocation secondary to fall; Hip was relocated by ortho.  Romeo sexton s/p ROMAN 1/19/2018 was seen and discharged from outpt PT ambulating independently without AD   Precautions/Limitations right hip precautions   Weight-Bearing Status - RLE weight-bearing as tolerated   Cognitive Status Examination   Orientation orientation to person, place and time   Level of Consciousness alert   Follows Commands and Answers Questions 100% of the time   Personal Safety and  "Judgment intact   Memory impaired   Cognitive Comment base line memory deficits from stroke Feb 2018   Pain Assessment   Patient Currently in Pain Yes, see Vital Sign flowsheet  (achey not sharp pain)   Posture    Posture Comments forward head, round shoulders   Range of Motion (ROM)   ROM Comment wfl slow due to pain   Strength   Strength Comments 4- LEs    Bed Mobility   Bed Mobility Comments independent, slow and guarded due to pain   Transfer Skills   Transfer Comments supervision due to bed linens, bed clothes and IV line   Gait   Gait Comments slow; wide LARRY; short step length; ambulate with supervision 100' x 1 no AD; independent with rolling walker   Balance   Balance Comments appropriately cautious   Clinical Impression   Criteria for Skilled Therapeutic Intervention yes, treatment indicated   PT Diagnosis R hip pain instability of gait   Influenced by the following impairments weakness; pain   Functional limitations due to impairments difficulty with ambulation if not using AD   Clinical Presentation Stable/Uncomplicated   Clinical Presentation Rationale one treatment to ensure safe independent ambulation   Clinical Decision Making (Complexity) Low complexity   Therapy Frequency` 2 times/day   Predicted Duration of Therapy Intervention (days/wks) one day only   Anticipated Equipment Needs at Discharge front wheeled walker   Anticipated Discharge Disposition Home with Assist   Risk & Benefits of therapy have been explained Yes   Patient, Family & other staff in agreement with plan of care Yes   Clinical Impression Comments pt will benefit from 2 PT sessions for ensure safety with RW and review of ROMAN precautions and exercises    [facility] [employer]-PAC TM \"6 Clicks\"   2016, Trustees of [facility], under license to [employer].  All rights reserved.   6 Clicks Short Forms Basic Mobility Inpatient Short Form   [facility] AM-PAC  \"6 Clicks\" V.2 Basic Mobility Inpatient Short Form   1. Turning " from your back to your side while in a flat bed without using bedrails? 3 - A Little   2. Moving from lying on your back to sitting on the side of a flat bed without using bedrails? 3 - A Little   3. Moving to and from a bed to a chair (including a wheelchair)? 3 - A Little   4. Standing up from a chair using your arms (e.g., wheelchair, or bedside chair)? 3 - A Little   5. To walk in hospital room? 3 - A Little   6. Climbing 3-5 steps with a railing? 3 - A Little   Basic Mobility Raw Score (Score out of 24.Lower scores equate to lower levels of function) 18   Total Evaluation Time   Total Evaluation Time (Minutes) 15

## 2018-06-11 ENCOUNTER — HOSPITAL ENCOUNTER (OUTPATIENT)
Dept: MRI IMAGING | Facility: CLINIC | Age: 78
Discharge: HOME | End: 2018-06-11
Attending: ORTHOPAEDIC SURGERY | Admitting: ORTHOPAEDIC SURGERY
Payer: MEDICARE

## 2018-06-11 ENCOUNTER — HOSPITAL ENCOUNTER (OUTPATIENT)
Dept: MRI IMAGING | Facility: CLINIC | Age: 78
End: 2018-06-11
Attending: ORTHOPAEDIC SURGERY
Payer: MEDICARE

## 2018-06-11 DIAGNOSIS — M54.2 NECK PAIN: ICD-10-CM

## 2018-06-11 DIAGNOSIS — M79.606 PAIN OF LOWER EXTREMITY, UNSPECIFIED LATERALITY: ICD-10-CM

## 2018-06-11 PROCEDURE — 72141 MRI NECK SPINE W/O DYE: CPT

## 2018-06-11 PROCEDURE — 72148 MRI LUMBAR SPINE W/O DYE: CPT

## 2018-08-31 ENCOUNTER — HOSPITAL ENCOUNTER (OUTPATIENT)
Dept: PHYSICAL THERAPY | Facility: CLINIC | Age: 78
Setting detail: THERAPIES SERIES
End: 2018-08-31
Attending: ORTHOPAEDIC SURGERY
Payer: MEDICARE

## 2018-08-31 PROCEDURE — 97110 THERAPEUTIC EXERCISES: CPT | Mod: GP | Performed by: PHYSICAL THERAPIST

## 2018-08-31 PROCEDURE — G8981 BODY POS CURRENT STATUS: HCPCS | Mod: GP,CJ | Performed by: PHYSICAL THERAPIST

## 2018-08-31 PROCEDURE — 97140 MANUAL THERAPY 1/> REGIONS: CPT | Mod: GP | Performed by: PHYSICAL THERAPIST

## 2018-08-31 PROCEDURE — 40000718 ZZHC STATISTIC PT DEPARTMENT ORTHO VISIT: Performed by: PHYSICAL THERAPIST

## 2018-08-31 PROCEDURE — 97162 PT EVAL MOD COMPLEX 30 MIN: CPT | Mod: GP | Performed by: PHYSICAL THERAPIST

## 2018-08-31 PROCEDURE — G8982 BODY POS GOAL STATUS: HCPCS | Mod: GP,CI | Performed by: PHYSICAL THERAPIST

## 2018-08-31 NOTE — PROGRESS NOTES
08/31/18 1400   General Information   Type of Visit Initial OP Ortho PT Evaluation   Start of Care Date 08/31/18   Referring Physician Dr Daniel Hyde   Patient/Family Goals Statement To improve my ability to turn my head   Orders Evaluate and Treat   Orders Comment HEP, neck isometrics   Date of Order 08/27/18   Insurance Type Medicare;[employer]   Medical Diagnosis neck pain   Body Part(s)   Body Part(s) Cervical Spine   Presentation and Etiology   Pertinent history of current problem (include personal factors and/or comorbidities that impact the POC) Pt states he had a stroke in Feb 2018.   Pt states in June 2018 he developed  double vision in L eye.  Pt notes intermittent neck pain X 2 + years.  Neck pain @ best 2-3/10 and @ worst 8-9/10.  No radiating pain/ numbness/tingling.  MRI in chart  : deg changes especially C3/4.  PMHX:  heart stents/ MI/ cardiac ablation,  diabetes,  HBP,  B ROMAN,  L TKA,  pt notes since his stroke he has noticed wt loss. Moderate: comorbidities.     Impairments A. Pain;G. Impaired balance;O. Blurred vision   Onset date of current episode/exacerbation 08/27/18   Pain quality B. Dull;D. Burning   Pain exacerbation comment Reading --w/ head tipped down.  Neck rotation. Wakes  3-4X/wk in the morning w/ neck pain   Pain/symptoms eased by C. Rest  (my pillow)   Progression of symptoms since onset: Unchanged   Prior Level of Function   Functional Level Prior Comment Pt reads 4-9 hours/day.  Has membership at [employer]   Current Level of Function   Patient role/employment history F. Retired   Fall Risk Screen   Fall screen completed by PT   Have you fallen 2 or more times in the past year? Yes   Have you fallen and had an injury in the past year? Yes   Timed Up and Go score (seconds) 16.5   Is patient a fall risk? Yes;Department fall risk interventions implemented   Cervical Spine   Posture Significant FH,  rounded shoulders, increased thoracic kyphosis  head slight tilt to R/ chin to L     Cervical Flexion ROM WNL, discomfort at endrange   Cervical Extension ROM 5% w/ increased pain   Cervical Right Side Bending ROM 60% w/ L sided pain   Cervical Left Side Bending ROM 40% w/ L sided pain   Cervical Right Rotation ROM 50% w/ L sided pain   Cervical Left Rotation ROM 80%   Shoulder AROM Screen B shoulder WFL and =   Shoulder Shrug (C2-C4) Strength B 5/5   Shoulder Abd (C5) Strength B 4- to 4/5   Upper Trapezius Flexibility increased tone   Levator Scapula Flexibility increased tone   Pectoralis Minor Flexibility moderate tightness   Alar Ligament Test neg   Transverse Ligament Test difficulty assessing due to limited mobility   Spurling Test significant limitation in mobility   Cervical/Shoulder Special Tests Comments manual traction no change   Segmental Mobility-Cervical significant hypomobility w/ sideglide attempts   Palpation Mild tenderness B UT/  slight tightness R scalene   Planned Therapy Interventions   Planned Therapy Interventions manual therapy;ROM;strengthening;stretching   Clinical Impression   Criteria for Skilled Therapeutic Interventions Met yes, treatment indicated   PT Diagnosis neck pain   Influenced by the following impairments pain, decreased mobility, poor posture   Functional limitations due to impairments prolonged reading, turning head to the right.  waking in the morning w/ neck pain   Clinical Presentation Evolving/Changing   Clinical Presentation Rationale complication of stroke and blurred vision recently w/ chronic neck pain   Clinical Decision Making (Complexity) Moderate complexity   Therapy Frequency 1 time/week   Predicted Duration of Therapy Intervention (days/wks) 6 weeks   Risk & Benefits of therapy have been explained Yes   Patient, Family & other staff in agreement with plan of care Yes   Education Assessment   Barriers to Learning Cognitive  (some memory and word finding problems since CVA)   Ortho Goal 1   Goal Description 1.  Pt will have increased neck  rotation to 80%   Target Date 10/20/18   Ortho Goal 2   Goal Description 2.  Pt will be able to read 30-40 min w/ neck pain no > 4/10   Target Date 10/20/18   Ortho Goal 3   Goal Description 3.  Pt will report decrease frequency of waking in the morning w/ neck pain to 2X/wk   Target Date 10/20/18   Ortho Goal 4   Goal Description 4.  Pt will be independent and consistent w/HEP   Target Date 10/20/18   Total Evaluation Time   Total Evaluation Time 30   Therapy Certification   Certification date from 08/31/18   Certification date to 10/20/18   Medical Diagnosis neck pain     Thank you for this referral,    Sally Puri, PT,  CEAS   #[id number]  [facility]  [phone]

## 2018-08-31 NOTE — PROGRESS NOTES
[facility]          OUTPATIENT PHYSICAL THERAPY ORTHOPEDIC EVALUATION  PLAN OF TREATMENT FOR OUTPATIENT REHABILITATION  (COMPLETE FOR INITIAL CLAIMS ONLY)  Patient's Last Name, First Name, M.I.  YOB: 1940  Bryson Mendez    Provider s Name:  [facility]   Medical Record No.  [id number]   Start of Care Date:  08/31/18   Onset Date:  08/27/18   Type:     _X__PT   ___OT   ___SLP Medical Diagnosis:  neck pain     PT Diagnosis:  neck pain   Visits from SOC:  1      _________________________________________________________________________________  Plan of Treatment/Functional Goals:  manual therapy, ROM, strengthening, stretching     Goals  Goal Description: 1.  Pt will have increased neck rotation to 80%  Target Date: 10/20/18    Goal Description: 2.  Pt will be able to read 30-40 min w/ neck pain no > 4/10  Target Date: 10/20/18    Goal Description: 3.  Pt will report decrease frequency of waking in the morning w/ neck pain to 2X/wk  Target Date: 10/20/18    Goal Description: 4.  Pt will be independent and consistent w/HEP  Target Date: 10/20/18       Therapy Frequency:  1 time/week  Predicted Duration of Therapy Intervention:  6 weeks    Sally Puri, PT                 I CERTIFY THE NEED FOR THESE SERVICES FURNISHED UNDER        THIS PLAN OF TREATMENT AND WHILE UNDER MY CARE .             Physician Signature               Date    X_____________________________________________________                             Certification Date From:  08/31/18   Certification Date To:  10/20/18    Referring Provider:  Dr Daniel Hyde    Initial Assessment        See Epic Evaluation Start of Care Date: 08/31/18

## 2018-09-07 ENCOUNTER — HOSPITAL ENCOUNTER (OUTPATIENT)
Dept: PHYSICAL THERAPY | Facility: CLINIC | Age: 78
Setting detail: THERAPIES SERIES
End: 2018-09-07
Attending: ORTHOPAEDIC SURGERY
Payer: MEDICARE

## 2018-09-07 PROCEDURE — 40000718 ZZHC STATISTIC PT DEPARTMENT ORTHO VISIT: Performed by: PHYSICAL THERAPIST

## 2018-09-07 PROCEDURE — 97140 MANUAL THERAPY 1/> REGIONS: CPT | Mod: GP | Performed by: PHYSICAL THERAPIST

## 2018-09-07 PROCEDURE — 97110 THERAPEUTIC EXERCISES: CPT | Mod: GP | Performed by: PHYSICAL THERAPIST

## 2018-09-14 ENCOUNTER — HOSPITAL ENCOUNTER (OUTPATIENT)
Dept: PHYSICAL THERAPY | Facility: CLINIC | Age: 78
Setting detail: THERAPIES SERIES
End: 2018-09-14
Attending: ORTHOPAEDIC SURGERY
Payer: MEDICARE

## 2018-09-14 PROCEDURE — 40000718 ZZHC STATISTIC PT DEPARTMENT ORTHO VISIT: Performed by: PHYSICAL THERAPIST

## 2018-09-14 PROCEDURE — 97140 MANUAL THERAPY 1/> REGIONS: CPT | Mod: GP | Performed by: PHYSICAL THERAPIST

## 2018-09-14 PROCEDURE — 97110 THERAPEUTIC EXERCISES: CPT | Mod: GP | Performed by: PHYSICAL THERAPIST

## 2018-09-21 ENCOUNTER — HOSPITAL ENCOUNTER (OUTPATIENT)
Dept: PHYSICAL THERAPY | Facility: CLINIC | Age: 78
Setting detail: THERAPIES SERIES
End: 2018-09-21
Attending: ORTHOPAEDIC SURGERY
Payer: MEDICARE

## 2018-09-21 PROCEDURE — 97140 MANUAL THERAPY 1/> REGIONS: CPT | Mod: GP | Performed by: PHYSICAL THERAPIST

## 2018-09-21 PROCEDURE — 97110 THERAPEUTIC EXERCISES: CPT | Mod: GP | Performed by: PHYSICAL THERAPIST

## 2018-09-21 PROCEDURE — 40000718 ZZHC STATISTIC PT DEPARTMENT ORTHO VISIT: Performed by: PHYSICAL THERAPIST

## 2018-10-19 NOTE — PROGRESS NOTES
OUTPATIENT PHYSICAL THERAPY DISCHARGE SUMMARY    Dr Daniel Hyde      8/31/18 to 09/21/18 1200   Signing Clinician's Name / Credentials   Signing clinician's name / credentials Sally Puri,  PT [id number]   Session Number   Session Number 4 [facility]/ [employer]   Ortho Goal 1   Goal Description 1.  Pt will have increased neck rotation to 80%.  9/21/18 R 60%,  L 70%   Target Date 10/20/18   Ortho Goal 2   Goal Description 2.  Pt will be able to read 30-40 min w/ neck pain no > 4/10.  9/21/18  3-4/10  MET   Target Date 10/20/18   Ortho Goal 3   Goal Description 3.  Pt will report decrease frequency of waking in the morning w/ neck pain to 2X/wk.  9/21/18 2-3 X /wk   Target Date 10/20/18   Ortho Goal 4   Goal Description 4.  Pt will be independent and consistent w/HEP.  9/21/18  MET   Target Date 10/20/18   Subjective Report   Subjective Report Pt states he is having difficulty getting ex done--notes he is doing them 1-2X/wk.  Pt reports pain 2-3/10---especially w/ tipping head back.   Pt states he read a lot yesterday and noted it some afterwards.    Objective Measure   Objective Measure CROM flex WFL,  ext 30% w/ pain, Rot R 60%, L 70%    Therapeutic Procedure/exercise   Patient Response shoulder rolls, pec stretch and cervical rotation w/ eye movements on own.   Pt had some confusion w/ pec stretch.   Treatment Detail Pt instructed to set a timer to break up reading and do a simple ex.   Cervical ext isometrics.    LT set w/ hands turned out.   RTB rows and shoulder ext w/ LT set X 10 each (cuing on technique)   Manual Therapy   Patient Response R rot 70%, L 80% afterwards   Treatment Detail MET for ERSR T1 and B 1st rib (increased rotation afterwards) STM to neck/ B UT/ levator seated   Plan   Home program ex as above   Plan  Pt did not schedule further PT visits and will be discharged at this time.     Comments   Comments Pt had met goals 2 and 4.   Progress noted as above.  No final G code reported as pt did not return

## 2018-12-20 ENCOUNTER — HOSPITAL ENCOUNTER (OUTPATIENT)
Dept: OCCUPATIONAL THERAPY | Facility: CLINIC | Age: 78
Setting detail: THERAPIES SERIES
End: 2018-12-20
Attending: ORTHOPAEDIC SURGERY
Payer: MEDICARE

## 2018-12-20 PROCEDURE — G9165 ATTEN CURRENT STATUS: HCPCS | Mod: GO,CJ

## 2018-12-20 PROCEDURE — 97166 OT EVAL MOD COMPLEX 45 MIN: CPT | Mod: GO

## 2018-12-20 PROCEDURE — G9166 ATTEN GOAL STATUS: HCPCS | Mod: GO,CJ

## 2018-12-20 PROCEDURE — 97535 SELF CARE MNGMENT TRAINING: CPT | Mod: GO

## 2018-12-20 PROCEDURE — G9167 ATTEN D/C STATUS: HCPCS | Mod: GO,CJ

## 2018-12-20 NOTE — PROGRESS NOTES
12/20/18 0900   Quick Adds   Type of Visit Initial Outpatient Occupational Therapy Evaluation       Present No   General Information   Start Of Care Date 12/20/18   Referring Physician Thien Davison MD   Orders Evaluate and treat as indicated   Other Orders driving   Orders Date 12/14/18   Medical Diagnosis CVA   Onset of Illness/Injury or Date of Surgery 02/07/18   Precautions/Limitations Fall precautions   Surgical/Medical History Reviewed Yes   Additional Occupational Profile Info/Pertinent History of Current Problem On January 19th, Romeo had right ROMAN completed.  He experienced chest pain and it was recommended that he follow up with cardiology.  The cardiologist performed an angiogram on 2/7/2018.  When bringing him out of anesthethia he was found to have weakness on the right side with a facial droop.  He was immediately given TPA x 10 minutes.  He was hospitalized until 2/9/2018 and returned home.  Pt was seen in OP OT 3/12/18-4/12/18 to address cognition and IADLs. Pt present today to re-attempt driving screen.    Role/Living Environment   Current Community Support Family/friend caregiver   Patient role/Employment history Retired   Current Living Environment House   Number of Stairs to Enter Home 3   Number of Stairs Within Home 15 to basement   Prior Level - Transfers Independent   Prior Level - Ambulation Independent   Prior Level - ADLS Independent   Prior Responsibilities - IADL Yardwork;Medication management;Finances;Driving   Current Assistive Devices - ADL (None)   Role/Living Environment Comments Pt no longer needing walker.    Patient/family Goals Statement Pt goal is to return to driving.    Pain   Patient currently in pain No   Cognitive Status Examination   Orientation Orientation to person, place and time   Level of Consciousness Alert   Follows Commands and Answers Questions Able to follow multistep instructions;75% of the time   Personal Safety and Judgment  Intact   Memory Impaired   Memory Comments Pt states he has difficulty recalling plan for the day. Forgetful when relaying story to friends/family. Will forget details and words.    Attention Alternating attention impaired, difficulty shifting between tasks;Divided attention impaired, difficulty with simultaneous tasks   Organization/Problem Solving Reports problems with organization   Executive Function Working memory impaired, decreased storage of information for performing tasks;Cognitive flexibility impaired   Cognitive Comment SDMT= 22 in 90 seconds putting him below -2.0 SD. SBT= normal, however does miss points on delayed recall. Pt states difficulty recalling details of stories. Wife reports pt forgets to take medications at times- needing cues. dynavision divided attention scores 41 missing 1# second attempt 35 missing 4 numbers.    Visual Perception   Visual Perception Wears glasses  (sprism glasses)   Visual Perception Comments Pt with prism glasses- reports no difficulty with vision.    Range of Motion (ROM)   ROM Quick Adds No deficits identified   Hand Strength   Hand Dominance Right   Coordination   Upper Extremity Coordination No deficits were identified   Transfer Skill   Level of [place]: Transfers independent   Tub/Shower Transfer   Tub/Shower Transfer (independent)   Upper Body Dressing   Level of [place]: Dress Upper Body independent   Lower Body Dressing   Level of [place]: Dress Lower Body independent   Instrumental Activities of Daily Living Assessment   Community Mobility currently not driving. Does state that he is able to give wife directions while she drives. Pt at times forgets medications needing assistance from wife.    Planned Therapy Interventions   Planned Therapy Interventions IADL training   Intervention Comments driving screen with education on results.    OT Goal 1   Goal Identifier driving   Goal Description Pt and caregiver will verbalizes an understanding of  the findings on the driving assessment   Target Date 12/20/18   Date Met 12/20/18   Clinical Impression   Criteria for Skilled Therapeutic Interventions Met Yes, treatment indicated   OT Diagnosis impaired cognition/attention/memory and IADLs following a stroke   Influenced by the following impairments impaired cognition in divided attention, sustained attention, cognitive fatigue, memory   Assessment of Occupational Performance 1-3 Performance Deficits   Identified Performance Deficits driving and medication management   Clinical Decision Making (Complexity) Moderate complexity   Therapy Frequency 1x treatment   Predicted Duration of Therapy Intervention (days/wks) 1x treatment   Risks and Benefits of Treatment have been explained. Yes   Patient, Family & other staff in agreement with plan of care Yes   OT G-codes   Attention Current Status () CJ   Attention Current Status Modifier Rationale SDMT and dynavision divided attention results.    Attention Goal Status () CJ   Total Evaluation Time   OT Eval, Moderate Complexity Minutes (45869) 20

## 2018-12-20 NOTE — PROGRESS NOTES
[facility]          OUTPATIENT OCCUPATIONAL THERAPY  EVALUATION  PLAN OF TREATMENT FOR OUTPATIENT REHABILITATION  (COMPLETE FOR INITIAL CLAIMS ONLY)  Patient's Last Name, First Name, M.I.  YOB: 1940  Bryson Mendez                        Provider's Name  [facility] Medical Record No.  [id number]                               Onset Date:     02/07/18   Start of Care Date:     12/20/18   Type:     ___PT   _X_OT   ___SLP Medical Diagnosis:     CVA                          OT Diagnosis:     impaired cognition/attention/memory and IADLs following a stroke Visits from SOC:  1   _________________________________________________________________________________  Plan of Treatment/Functional Goals:  IADL training     driving screen with education on results.               Goals  Goal Identifier: driving  Goal Description: Pt and caregiver will verbalizes an understanding of the findings on the driving assessment  Target Date: 12/20/18  Date Met: 12/20/18                                                                                           Therapy Frequency: 1x treatment     Predicted Duration of Therapy Intervention (days/wks): 1x treatment  Grace Randall OT          I CERTIFY THE NEED FOR THESE SERVICES FURNISHED UNDER        THIS PLAN OF TREATMENT AND WHILE UNDER MY CARE .             Physician Signature               Date    X_____________________________________________________                       ,     ,                 Referring Physician: Thien Davison MD     Initial Assessment        See Epic Evaluation      Start Of Care Date: 12/20/18

## 2019-01-22 NOTE — ADDENDUM NOTE
Encounter addended by: Grace Randall, OT on: 1/22/2019 2:42 PM   Actions taken: Charge Capture section accepted

## 2019-12-16 ENCOUNTER — HEALTH MAINTENANCE LETTER (OUTPATIENT)
Age: 79
End: 2019-12-16

## 2020-09-05 ENCOUNTER — APPOINTMENT (OUTPATIENT)
Dept: CT IMAGING | Facility: CLINIC | Age: 80
End: 2020-09-05
Attending: PHYSICIAN ASSISTANT
Payer: MEDICARE

## 2020-09-05 ENCOUNTER — HOSPITAL ENCOUNTER (OUTPATIENT)
Facility: CLINIC | Age: 80
Setting detail: OBSERVATION
Discharge: HOME | End: 2020-09-06
Attending: PHYSICIAN ASSISTANT | Admitting: FAMILY MEDICINE
Payer: MEDICARE

## 2020-09-05 ENCOUNTER — APPOINTMENT (OUTPATIENT)
Dept: GENERAL RADIOLOGY | Facility: CLINIC | Age: 80
End: 2020-09-05
Attending: INTERNAL MEDICINE
Payer: MEDICARE

## 2020-09-05 DIAGNOSIS — R07.9 CHEST PAIN: ICD-10-CM

## 2020-09-05 DIAGNOSIS — K59.00 CONSTIPATION, UNSPECIFIED CONSTIPATION TYPE: Primary | ICD-10-CM

## 2020-09-05 DIAGNOSIS — Z03.818 ENCNTR FOR OBS FOR SUSP EXPSR TO OTH BIOLG AGENTS RULED OUT: ICD-10-CM

## 2020-09-05 DIAGNOSIS — R07.9 CHEST PAIN, UNSPECIFIED TYPE: ICD-10-CM

## 2020-09-05 LAB
ALBUMIN SERPL-MCNC: 4 G/DL (ref 3.4–5)
ALP SERPL-CCNC: 78 U/L (ref 40–150)
ALT SERPL W P-5'-P-CCNC: 26 U/L (ref 0–70)
ANION GAP SERPL CALCULATED.3IONS-SCNC: 3 MMOL/L (ref 3–14)
AST SERPL W P-5'-P-CCNC: 27 U/L (ref 0–45)
BASOPHILS # BLD AUTO: 0.1 10E9/L (ref 0–0.2)
BASOPHILS NFR BLD AUTO: 0.4 %
BILIRUB SERPL-MCNC: 1.4 MG/DL (ref 0.2–1.3)
BUN SERPL-MCNC: 11 MG/DL (ref 7–30)
CALCIUM SERPL-MCNC: 9.5 MG/DL (ref 8.5–10.1)
CHLORIDE SERPL-SCNC: 108 MMOL/L (ref 94–109)
CO2 SERPL-SCNC: 29 MMOL/L (ref 20–32)
CREAT SERPL-MCNC: 0.74 MG/DL (ref 0.66–1.25)
DIFFERENTIAL METHOD BLD: ABNORMAL
EOSINOPHIL # BLD AUTO: 0.2 10E9/L (ref 0–0.7)
EOSINOPHIL NFR BLD AUTO: 1.4 %
ERYTHROCYTE [DISTWIDTH] IN BLOOD BY AUTOMATED COUNT: 13.5 % (ref 10–15)
GFR SERPL CREATININE-BSD FRML MDRD: 86 ML/MIN/{1.73_M2}
GLUCOSE SERPL-MCNC: 110 MG/DL (ref 70–99)
HCT VFR BLD AUTO: 47.9 % (ref 40–53)
HGB BLD-MCNC: 16.5 G/DL (ref 13.3–17.7)
IMM GRANULOCYTES # BLD: 0.2 10E9/L (ref 0–0.4)
IMM GRANULOCYTES NFR BLD: 1.4 %
LYMPHOCYTES # BLD AUTO: 3.8 10E9/L (ref 0.8–5.3)
LYMPHOCYTES NFR BLD AUTO: 27.4 %
MCH RBC QN AUTO: 31.8 PG (ref 26.5–33)
MCHC RBC AUTO-ENTMCNC: 34.4 G/DL (ref 31.5–36.5)
MCV RBC AUTO: 92 FL (ref 78–100)
MONOCYTES # BLD AUTO: 1.4 10E9/L (ref 0–1.3)
MONOCYTES NFR BLD AUTO: 10 %
NEUTROPHILS # BLD AUTO: 8.2 10E9/L (ref 1.6–8.3)
NEUTROPHILS NFR BLD AUTO: 59.4 %
NRBC # BLD AUTO: 0 10*3/UL
NRBC BLD AUTO-RTO: 0 /100
NT-PROBNP SERPL-MCNC: 564 PG/ML (ref 0–1800)
PLATELET # BLD AUTO: 172 10E9/L (ref 150–450)
POTASSIUM SERPL-SCNC: 3.9 MMOL/L (ref 3.4–5.3)
PROT SERPL-MCNC: 7.6 G/DL (ref 6.8–8.8)
RBC # BLD AUTO: 5.19 10E12/L (ref 4.4–5.9)
SODIUM SERPL-SCNC: 140 MMOL/L (ref 133–144)
TROPONIN I SERPL-MCNC: <0.015 UG/L (ref 0–0.04)
TROPONIN I SERPL-MCNC: <0.015 UG/L (ref 0–0.04)
WBC # BLD AUTO: 13.9 10E9/L (ref 4–11)

## 2020-09-05 PROCEDURE — 96374 THER/PROPH/DIAG INJ IV PUSH: CPT | Mod: 59 | Performed by: FAMILY MEDICINE

## 2020-09-05 PROCEDURE — 84484 ASSAY OF TROPONIN QUANT: CPT | Performed by: FAMILY MEDICINE

## 2020-09-05 PROCEDURE — 99285 EMERGENCY DEPT VISIT HI MDM: CPT | Mod: 25 | Performed by: FAMILY MEDICINE

## 2020-09-05 PROCEDURE — 93010 ELECTROCARDIOGRAM REPORT: CPT | Mod: Z6 | Performed by: FAMILY MEDICINE

## 2020-09-05 PROCEDURE — C9803 HOPD COVID-19 SPEC COLLECT: HCPCS | Performed by: FAMILY MEDICINE

## 2020-09-05 PROCEDURE — 25000125 ZZHC RX 250: Performed by: PHYSICIAN ASSISTANT

## 2020-09-05 PROCEDURE — 93005 ELECTROCARDIOGRAM TRACING: CPT | Performed by: FAMILY MEDICINE

## 2020-09-05 PROCEDURE — 25000132 ZZH RX MED GY IP 250 OP 250 PS 637: Mod: GY | Performed by: INTERNAL MEDICINE

## 2020-09-05 PROCEDURE — 25000128 H RX IP 250 OP 636: Performed by: PHYSICIAN ASSISTANT

## 2020-09-05 PROCEDURE — C9113 INJ PANTOPRAZOLE SODIUM, VIA: HCPCS | Performed by: PHYSICIAN ASSISTANT

## 2020-09-05 PROCEDURE — U0003 INFECTIOUS AGENT DETECTION BY NUCLEIC ACID (DNA OR RNA); SEVERE ACUTE RESPIRATORY SYNDROME CORONAVIRUS 2 (SARS-COV-2) (CORONAVIRUS DISEASE [COVID-19]), AMPLIFIED PROBE TECHNIQUE, MAKING USE OF HIGH THROUGHPUT TECHNOLOGIES AS DESCRIBED BY CMS-2020-01-R: HCPCS | Performed by: PHYSICIAN ASSISTANT

## 2020-09-05 PROCEDURE — 36415 COLL VENOUS BLD VENIPUNCTURE: CPT | Performed by: FAMILY MEDICINE

## 2020-09-05 PROCEDURE — 99219 ZZC INITIAL OBSERVATION CARE,LEVL II: CPT | Performed by: INTERNAL MEDICINE

## 2020-09-05 PROCEDURE — 80053 COMPREHEN METABOLIC PANEL: CPT | Performed by: FAMILY MEDICINE

## 2020-09-05 PROCEDURE — G0378 HOSPITAL OBSERVATION PER HR: HCPCS

## 2020-09-05 PROCEDURE — 71275 CT ANGIOGRAPHY CHEST: CPT

## 2020-09-05 PROCEDURE — 83880 ASSAY OF NATRIURETIC PEPTIDE: CPT | Mod: GZ | Performed by: PHYSICIAN ASSISTANT

## 2020-09-05 PROCEDURE — 25000132 ZZH RX MED GY IP 250 OP 250 PS 637: Performed by: PHYSICIAN ASSISTANT

## 2020-09-05 PROCEDURE — 93005 ELECTROCARDIOGRAM TRACING: CPT | Mod: 76 | Performed by: FAMILY MEDICINE

## 2020-09-05 PROCEDURE — 74018 RADEX ABDOMEN 1 VIEW: CPT

## 2020-09-05 PROCEDURE — 85025 COMPLETE CBC W/AUTO DIFF WBC: CPT | Performed by: FAMILY MEDICINE

## 2020-09-05 PROCEDURE — 93010 ELECTROCARDIOGRAM REPORT: CPT | Mod: 76 | Performed by: FAMILY MEDICINE

## 2020-09-05 RX ORDER — ASPIRIN 81 MG/1
81 TABLET ORAL DAILY
Status: DISCONTINUED | OUTPATIENT
Start: 2020-09-06 | End: 2020-09-06 | Stop reason: HOSPADM

## 2020-09-05 RX ORDER — ACETAMINOPHEN 500 MG
500 TABLET ORAL ONCE
Status: COMPLETED | OUTPATIENT
Start: 2020-09-05 | End: 2020-09-05

## 2020-09-05 RX ORDER — BISACODYL 5 MG/1
TABLET, DELAYED RELEASE ORAL DAILY
COMMUNITY
End: 2020-10-12

## 2020-09-05 RX ORDER — ACETAMINOPHEN 325 MG/1
650 TABLET ORAL EVERY 4 HOURS PRN
Status: DISCONTINUED | OUTPATIENT
Start: 2020-09-05 | End: 2020-09-05

## 2020-09-05 RX ORDER — LOSARTAN POTASSIUM 25 MG/1
25 TABLET ORAL DAILY
Status: DISCONTINUED | OUTPATIENT
Start: 2020-09-05 | End: 2020-09-06 | Stop reason: HOSPADM

## 2020-09-05 RX ORDER — MAGNESIUM CARB/ALUMINUM HYDROX 105-160MG
296 TABLET,CHEWABLE ORAL ONCE
Status: COMPLETED | OUTPATIENT
Start: 2020-09-05 | End: 2020-09-05

## 2020-09-05 RX ORDER — IOPAMIDOL 755 MG/ML
76 INJECTION, SOLUTION INTRAVASCULAR ONCE
Status: COMPLETED | OUTPATIENT
Start: 2020-09-05 | End: 2020-09-05

## 2020-09-05 RX ORDER — ACETAMINOPHEN 325 MG/1
650 TABLET ORAL EVERY 4 HOURS PRN
Status: DISCONTINUED | OUTPATIENT
Start: 2020-09-05 | End: 2020-09-06 | Stop reason: HOSPADM

## 2020-09-05 RX ORDER — ONDANSETRON 2 MG/ML
4 INJECTION INTRAMUSCULAR; INTRAVENOUS EVERY 6 HOURS PRN
Status: DISCONTINUED | OUTPATIENT
Start: 2020-09-05 | End: 2020-09-06 | Stop reason: HOSPADM

## 2020-09-05 RX ORDER — METOPROLOL SUCCINATE 25 MG/1
25 TABLET, EXTENDED RELEASE ORAL DAILY
Status: DISCONTINUED | OUTPATIENT
Start: 2020-09-05 | End: 2020-09-06 | Stop reason: HOSPADM

## 2020-09-05 RX ORDER — ONDANSETRON 4 MG/1
4 TABLET, ORALLY DISINTEGRATING ORAL EVERY 6 HOURS PRN
Status: DISCONTINUED | OUTPATIENT
Start: 2020-09-05 | End: 2020-09-06 | Stop reason: HOSPADM

## 2020-09-05 RX ORDER — OXYBUTYNIN CHLORIDE 10 MG/1
20 TABLET, EXTENDED RELEASE ORAL EVERY EVENING
Status: DISCONTINUED | OUTPATIENT
Start: 2020-09-05 | End: 2020-09-06 | Stop reason: HOSPADM

## 2020-09-05 RX ORDER — OXYBUTYNIN CHLORIDE 15 MG/1
20 TABLET, EXTENDED RELEASE ORAL DAILY
COMMUNITY
Start: 2020-08-13

## 2020-09-05 RX ORDER — SIMVASTATIN 40 MG
40 TABLET ORAL AT BEDTIME
Status: DISCONTINUED | OUTPATIENT
Start: 2020-09-05 | End: 2020-09-06 | Stop reason: HOSPADM

## 2020-09-05 RX ORDER — OXYBUTYNIN CHLORIDE 10 MG/1
10 TABLET, EXTENDED RELEASE ORAL DAILY
COMMUNITY
End: 2020-09-05

## 2020-09-05 RX ORDER — SIMVASTATIN 40 MG
40 TABLET ORAL AT BEDTIME
COMMUNITY
Start: 2020-08-07

## 2020-09-05 RX ORDER — ASPIRIN 81 MG/1
324 TABLET, CHEWABLE ORAL ONCE
Status: COMPLETED | OUTPATIENT
Start: 2020-09-05 | End: 2020-09-05

## 2020-09-05 RX ORDER — SENNOSIDES 8.6 MG
1-2 TABLET ORAL 2 TIMES DAILY
Status: DISCONTINUED | OUTPATIENT
Start: 2020-09-05 | End: 2020-09-06 | Stop reason: HOSPADM

## 2020-09-05 RX ORDER — POLYETHYLENE GLYCOL 3350 17 G/17G
17 POWDER, FOR SOLUTION ORAL 2 TIMES DAILY
Status: DISCONTINUED | OUTPATIENT
Start: 2020-09-05 | End: 2020-09-06 | Stop reason: HOSPADM

## 2020-09-05 RX ORDER — METOPROLOL SUCCINATE 25 MG/1
25 TABLET, EXTENDED RELEASE ORAL DAILY
COMMUNITY
Start: 2020-08-13

## 2020-09-05 RX ADMIN — SENNOSIDES 1 TABLET: 8.6 TABLET, FILM COATED ORAL at 20:17

## 2020-09-05 RX ADMIN — OMEPRAZOLE 40 MG: 20 CAPSULE, DELAYED RELEASE ORAL at 17:59

## 2020-09-05 RX ADMIN — POLYETHYLENE GLYCOL 3350 17 G: 17 POWDER, FOR SOLUTION ORAL at 20:17

## 2020-09-05 RX ADMIN — ASPIRIN 81 MG 324 MG: 81 TABLET ORAL at 10:13

## 2020-09-05 RX ADMIN — SODIUM CHLORIDE 100 ML: 9 INJECTION, SOLUTION INTRAVENOUS at 08:54

## 2020-09-05 RX ADMIN — MAGNESIUM CITRATE 296 ML: 1.75 LIQUID ORAL at 17:59

## 2020-09-05 RX ADMIN — OXYBUTYNIN CHLORIDE 20 MG: 10 TABLET, EXTENDED RELEASE ORAL at 18:00

## 2020-09-05 RX ADMIN — METOPROLOL SUCCINATE 25 MG: 25 TABLET, EXTENDED RELEASE ORAL at 17:59

## 2020-09-05 RX ADMIN — ACETAMINOPHEN 500 MG: 500 TABLET, FILM COATED ORAL at 10:13

## 2020-09-05 RX ADMIN — IOPAMIDOL 76 ML: 755 INJECTION, SOLUTION INTRAVENOUS at 08:54

## 2020-09-05 RX ADMIN — SIMVASTATIN 40 MG: 40 TABLET, FILM COATED ORAL at 21:55

## 2020-09-05 RX ADMIN — PANTOPRAZOLE SODIUM 40 MG: 40 INJECTION, POWDER, FOR SOLUTION INTRAVENOUS at 10:13

## 2020-09-05 RX ADMIN — LOSARTAN POTASSIUM 25 MG: 25 TABLET, FILM COATED ORAL at 18:00

## 2020-09-05 RX ADMIN — POLYETHYLENE GLYCOL 3350, SODIUM SULFATE ANHYDROUS, SODIUM BICARBONATE, SODIUM CHLORIDE, POTASSIUM CHLORIDE 4000 ML: 236; 22.74; 6.74; 5.86; 2.97 POWDER, FOR SOLUTION ORAL at 20:33

## 2020-09-05 RX ADMIN — APIXABAN 5 MG: 5 TABLET, FILM COATED ORAL at 20:17

## 2020-09-05 ASSESSMENT — ENCOUNTER SYMPTOMS
ABDOMINAL PAIN: 0
PALPITATIONS: 0
MUSCULOSKELETAL NEGATIVE: 1
VOMITING: 0
CONSTITUTIONAL NEGATIVE: 1
GASTROINTESTINAL NEGATIVE: 1
RESPIRATORY NEGATIVE: 1
NAUSEA: 0
SHORTNESS OF BREATH: 0
COUGH: 0
FEVER: 0
DIAPHORESIS: 0

## 2020-09-05 ASSESSMENT — MIFFLIN-ST. JEOR: SCORE: 1496.25

## 2020-09-05 NOTE — ED TRIAGE NOTES
CP started about 0200 last night and has cont through today 5/10 pain currently, at worst it was 9/10, hx of MI with 5 stents. Not taken ASA today. Pain was radiating into L shoulder. Chest heaviness sensation. Pt states the pain is worse with mvt and deep breathing. Pt took 3 nitro last night without relief. NSR with BBB. Pt did not take his medications last night. Pt states he took tums last night without relief.

## 2020-09-05 NOTE — ED NOTES
"Patient has  [place] to Observation  order. Patient has been given the Observation brochure -  What does Observation mean to me.\"  Patient has been given the opportunity to ask questions about observation status and their plan of care.      Tali George RN    "

## 2020-09-05 NOTE — ED PROVIDER NOTES
"  History     Chief Complaint   Patient presents with     Chest Pain     CP started about 0200 last night and has cont through today 5/10 pain currently, at worst it was 9/10, hx of MI with 5 stents. Not taken ASA today. Pain was radiating into L shoulder. Chest heaviness sensation.      HPI  Bryson Mendez is a 80 year old male with history of coronary artery disease (with history of stenting of the proximal LAD and mid circumflex) hypertension, type 2 diabetes mellitus, CVA, atrial fibrillation who presents with complaints of chest pain since 2 AM this morning.  Patient states he was sitting in his recliner watching a movie when he states he started to develop chest pain that \"started low and then moved up.\"  He states the pain is remained constant throughout his chest since that time but has lessened in severity.  He describes the pain as heavy and tight.  Patient tried taking 3 nitro at home without change in his pain.  Patient denies any associated shortness of breath.  He states that the pain feels consistent with his past episodes of angina.  The pain does not necessarily radiate.  He states he did note he fatigued easier than usual while mowing his lawn 3 days ago.  He denies any associated chest pain at that time.  Denies fevers, chills, cough, diaphoresis, nausea, vomiting, diarrhea, urinary symptoms, or leg pain/swelling.  Patient had an EP ablation of atrial flutter in 2014.  Patient follows with cardiology at [facility].  Patient is on Eliquis.        Lexiscan on 1/24/2018:  1.  Myocardial perfusion imaging using single isotope technique  demonstrated nontransmural infarction of the anterior, anteroseptal,  and apical walls with mild residual ischemia involving the mid and  distal anterior wall.   2. Gated images demonstrated mild anterior and anteroseptal  hypokinesis with mild left ventricular chamber enlargement. End  diastolic volume was 145 cc.  The left ventricular systolic " function  is mildly reduced, with an ejection fraction measured at 45%.  3. I do not have reports of previous studies but there is an note  accompanying the stress test information indicating that the patient  had a previous study performed at [facility] in 2016 which showed  some reversibility within the lateral apex. I am not sure how that  compares with current study. Results discussed with Dr. Condon.      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 Occurred silas-procedural while patient was having coronary angiogram for evaluation of angina. MRI revealed stenosis of left PCA, stroke was not embolic in nature. Patient was advised to start Eliquis and continue aspirin.       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 He will need a gastroscopy every 3 years for surveillance       A-fib (H) 07/14/2014     Priority: Medium     Diagnosed July 2014, hospitalized at [facility] and had  ablation at that time.       CAD (coronary artery disease) 06/18/2013     Priority: Medium     Overview:   -7/21/07 - Acute anterior MI complicated by Vent Fib requiring electrshock  -7/21/07 - emergent angioplasty and stenting of complete occlusion of proximal LAD and Ist diagonal branch of                        LAD  -7/22/07 - angiioplasty and revascularization of left circumflex artery with stent when had recurrant chest pain  -left ventricular systolic and diastolic dysfunction with ejection fraction - 30 %  -11/8/07 - coronary angiography shows mod in-stent stenosis of the LAD, patent circumflex stent, and mod right coronary disease              Angiogram on 12-5-08, previously placed proximal and mid left anterior descending stent to be widely patent as previously placed, first diagnosed had a 30-40% restenosis which was unchanged from his May 2008 study. His previously placed left circumflex stent was also widely patent. There is also PDA with an ostial 70% lesion which was unchanged.  12-4-08 LV systolic function is at the lower limits of normal with visually estimated ejection fraction of 50 %. Regional wall motion is normal. Mild Left atrial enlargement. Mild mitral and tricuspid regurgitation. Pulmonary artery pressure estimated at 26 + RAP.  MI in 2007, stenting of LAD, left circumflex-- multiple stents  Coronary angiogram 2/7/18 at [facility]:  DIAGNOSTIC - CORONARY   Right dominant coronary artery system   The left main artery was normal in appearance and free of obstructive disease.   The previously deployed stent in the proximal LAD artery is  patent. 50-70% lesion mid LAD distal to stent   The previously deployed stent in the proximal 1st diagonal artery is  patent   50-60% stenosis in the proximal Circumflex, proximal to stent   The previously deployed stent in the mid circumflex artery is widely patent   The RCA has moderate disease. Appears unchanged since 2007   Baseline Pd/Pa measured in  circumflex was 1.00    Baseline Pd/Pa measured in mid LAD was 0.96   No intervention indicated  LEFT VENTRICULAR FUNCTION   Left ventricular function is normal.   LVEDP elevated at 27 mmHg  RECOMMENDATIONS & PLAN   Medical Rx       Pre-diabetes 06/18/2013     Priority: Medium     MAIDA (obstructive sleep apnea) 06/18/2013     Priority: Medium     Uses CPAP       Osteoarthritis of hip 06/17/2013     Priority: Medium     Do you wish to do the replacement in the background? yes         S/P PTCA (percutaneous transluminal coronary angioplasty) 06/01/2011     Priority: Medium     Overview:   - 7/21/07 - two vessels  -7/23/07 - one vessel  -5/13/08 -one vessel       Mixed hyperlipidemia 09/10/2007     Priority: Medium     Acute myocardial infarction of anterolateral wall (H) 09/06/2007     Priority: Medium     Overview:   4/29/2008 Stress test. Large area of apical, anterior, anteroseptal and anteroloateral ischemia. Left anterior decemding coronary artery distribution is suggested. FUNCTION: Mildly reduced. Resting wall motion abnormalities were detected. Ejection Fraction is 42%. 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 COMMENTS: Compared to study of 10/2/2007, changes have occurred. The area of ischemia is larger.  5/13/2008 Cardiac Catheterization Report-85% stenosis in the proximal LAD, stent in the prozimal LAD aartery is widely patent-stent in the mid circumflex artery is widely patent.  Intervention-successful stenting (drug eluting) of the proximal LAD  12/3/08 Patient underwent an angiogram on December 5, 2008 which had previously place proximal and mid left anterior descending stent to be widely patent as previously placed. First diagnosed 30-40% restenosis which was unchanged from his may 2008 study. His previously placed left circumflex stent was also widely patent. There is also a  PDA with an ostial 70% lesion which was unchanged.  12/4/2008 1. LV systolic function is at the lower limits of normal with visually estimated ejection fraction of 50 %. 2. Regional wall motion is normal. 3. Pulmonary artery pressure is estimated at 26 mmhg + RAP. 4. Mild left atrial enlargement.       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     Priority: Medium        Past Medical History:    Past Medical History:   Diagnosis Date     A-fib (H)      Arthritis      Chronic low back pain      Chronic pain      Coronary artery disease      Depression      Diabetes mellitus (H)      History of stroke      Hypertension        Past Surgical History:    Past Surgical History:   Procedure Laterality Date     ARTHROPLASTY HIP  6/10/2011    Procedure:ARTHROPLASTY HIP; Minimally Invasive Surgery,J&J; Surgeon:DAVID REICH; Location:[facility]     ARTHROPLASTY HIP  6/17/2013    Procedure: ARTHROPLASTY HIP;  Right Total Hip Arthroplasty;  Surgeon: David Reich MD;  Location: [facility]     ARTHROPLASTY REVISION HIP Right 1/19/2018    Procedure: ARTHROPLASTY REVISION HIP;  Right Hip Acetabulum Revision;  Surgeon: David Reich MD;  Location: [facility]     CARDIAC SURGERY      states he has 5 stents     COLONOSCOPY  3/28/2011    COLONOSCOPY performed by KYARA SIMMONS at [facility]     ESOPHAGOSCOPY, GASTROSCOPY, DUODENOSCOPY (EGD), COMBINED N/A 7/7/2015    Procedure: COMBINED ESOPHAGOSCOPY, GASTROSCOPY, DUODENOSCOPY (EGD), BIOPSY SINGLE OR MULTIPLE;  Surgeon: Jody Conner MD;  Location: [facility]     H ABLATION ATRIAL FLUTTER  7/15/14     SURGICAL HISTORY OF -       UPP for snoring     VASCULAR SURGERY       VASECTOMY  1970's       Family History:    Family History   Problem Relation Age of Onset     Coronary Artery Disease Father         MI     Colon Cancer Mother      Cancer Brother      Rheumatoid Arthritis Sister      Heart Disease Sister        Social History:  Marital Status:    "[2]  Social History     Tobacco Use     Smoking status: Former Smoker     Last attempt to quit: 1971     Years since quittin.4     Smokeless tobacco: Never Used   Substance Use Topics     Alcohol use: Yes     Comment: occasional - 2-3 drinks per month     Drug use: No        Medications:    No current outpatient medications on file.        Review of Systems   Constitutional: Negative.  Negative for diaphoresis and fever.   Respiratory: Negative.  Negative for cough and shortness of breath.    Cardiovascular: Positive for chest pain. Negative for palpitations and leg swelling.   Gastrointestinal: Negative.  Negative for abdominal pain, nausea and vomiting.   Musculoskeletal: Negative.    Skin: Negative.    All other systems reviewed and are negative.      Physical Exam   BP: (!) 194/107  Pulse: 85  Temp: 96.4  F (35.8  C)  Resp: 18  Height: 177.8 cm (5' 10\")  Weight: 79.4 kg (175 lb)  SpO2: 97 %      Physical Exam  Constitutional:       General: He is not in acute distress.     Appearance: He is well-developed. He is not ill-appearing, toxic-appearing or diaphoretic.   HENT:      Head: Normocephalic and atraumatic.      Nose: Nose normal.      Mouth/Throat:      Lips: Pink.      Mouth: Mucous membranes are moist.   Eyes:      Conjunctiva/sclera: Conjunctivae normal.      Pupils: Pupils are equal, round, and reactive to light.   Neck:      Musculoskeletal: Normal range of motion and neck supple. No neck rigidity.   Cardiovascular:      Rate and Rhythm: Normal rate and regular rhythm.      Heart sounds: Normal heart sounds.   Pulmonary:      Effort: Pulmonary effort is normal. No respiratory distress.      Breath sounds: Normal breath sounds. No stridor. No wheezing, rhonchi or rales.   Chest:      Chest wall: No tenderness.   Abdominal:      General: There is no distension.      Palpations: Abdomen is soft.      Tenderness: There is no abdominal tenderness. There is no guarding or rebound.   Musculoskeletal: " Normal range of motion.         General: No swelling or tenderness.      Right lower leg: No edema.      Left lower leg: No edema.   Lymphadenopathy:      Cervical: No cervical adenopathy.   Skin:     General: Skin is warm and dry.      Findings: No rash.   Neurological:      Mental Status: He is alert and oriented to person, place, and time.         ED Course        Procedures               EKG #1 Interpretation:      Interpreted by Sunita Young PA-C and Dr. Montemayor  Symptoms at time of EKG: Chest tightness   Rhythm: Normal sinus   Rate: Normal  Axis: Normal  Ectopy: None  Conduction: Normal  ST Segments/ T Waves: Non-specific T wave changes (more prominent T waves in II, III, and aVF as well as deepening of inversions in aVL and V1 through V3)  Q Waves: none  Comparison to prior: Compared to     Clinical Impression: Non-specific EKG on 4/25/2018         EKG #2 Interpretation:      Interpreted by Sunita Young PA-C and Dr. Montemayor  Symptoms at time of EKG: None   Rhythm: Normal sinus   Rate: Normal  Axis: Normal  Ectopy: None  Conduction: Normal  ST Segments/ T Waves: Non-specific T wave changes  Q Waves: None  Comparison to prior: Compared to earlier EKG today    Clinical Impression: No changes      Results for orders placed or performed during the hospital encounter of 09/05/20 (from the past 24 hour(s))   CBC with platelets, differential   Result Value Ref Range    WBC 13.9 (H) 4.0 - 11.0 10e9/L    RBC Count 5.19 4.4 - 5.9 10e12/L    Hemoglobin 16.5 13.3 - 17.7 g/dL    Hematocrit 47.9 40.0 - 53.0 %    MCV 92 78 - 100 fl    MCH 31.8 26.5 - 33.0 pg    MCHC 34.4 31.5 - 36.5 g/dL    RDW 13.5 10.0 - 15.0 %    Platelet Count 172 150 - 450 10e9/L    Diff Method Automated Method     % Neutrophils 59.4 %    % Lymphocytes 27.4 %    % Monocytes 10.0 %    % Eosinophils 1.4 %    % Basophils 0.4 %    % Immature Granulocytes 1.4 %    Nucleated RBCs 0 0 /100    Absolute Neutrophil 8.2 1.6 - 8.3 10e9/L    Absolute  Lymphocytes 3.8 0.8 - 5.3 10e9/L    Absolute Monocytes 1.4 (H) 0.0 - 1.3 10e9/L    Absolute Eosinophils 0.2 0.0 - 0.7 10e9/L    Absolute Basophils 0.1 0.0 - 0.2 10e9/L    Abs Immature Granulocytes 0.2 0 - 0.4 10e9/L    Absolute Nucleated RBC 0.0    Comprehensive metabolic panel   Result Value Ref Range    Sodium 140 133 - 144 mmol/L    Potassium 3.9 3.4 - 5.3 mmol/L    Chloride 108 94 - 109 mmol/L    Carbon Dioxide 29 20 - 32 mmol/L    Anion Gap 3 3 - 14 mmol/L    Glucose 110 (H) 70 - 99 mg/dL    Urea Nitrogen 11 7 - 30 mg/dL    Creatinine 0.74 0.66 - 1.25 mg/dL    GFR Estimate 86 >60 mL/min/[1.73_m2]    GFR Estimate If Black >90 >60 mL/min/[1.73_m2]    Calcium 9.5 8.5 - 10.1 mg/dL    Bilirubin Total 1.4 (H) 0.2 - 1.3 mg/dL    Albumin 4.0 3.4 - 5.0 g/dL    Protein Total 7.6 6.8 - 8.8 g/dL    Alkaline Phosphatase 78 40 - 150 U/L    ALT 26 0 - 70 U/L    AST 27 0 - 45 U/L   Troponin I   Result Value Ref Range    Troponin I ES <0.015 0.000 - 0.045 ug/L   Nt probnp inpatient (BNP)   Result Value Ref Range    N-Terminal Pro BNP Inpatient 564 0 - 1,800 pg/mL   CT Chest Pulmonary Embolism w Contrast    Narrative    CT CHEST PULMONARY EMBOLISM WITH CONTRAST September 5, 2020 9:05 AM    CLINICAL HISTORY: Chest pain.    TECHNIQUE: CT angiogram chest during arterial phase injection IV  contrast. 2D and 3D MIP reconstructions were performed by the CT  technologist. Dose reduction techniques were used.     CONTRAST: 76mL Isovue-370    COMPARISON: None.    FINDINGS:  ANGIOGRAM CHEST: Pulmonary arteries are normal caliber and negative  for pulmonary emboli. Thoracic aorta is negative for aneurysm. No CT  evidence of right heart strain.    LUNGS AND PLEURA: No infiltrate, consolidation or mass. Airways are  patent. No pleural effusions.    MEDIASTINUM/AXILLAE: Heart is enlarged. No pericardial fluid.  Extensive coronary artery calcifications are present. No enlarged  lymph nodes. Thoracic aorta demonstrates calcified plaque  without  aneurysm.    UPPER ABDOMEN: Normal.    MUSCULOSKELETAL: Degenerative spine changes are present.      Impression    IMPRESSION:  1.  No evidence of pulmonary embolism. Thoracic aorta demonstrates  calcified plaque without aneurysm. Extensive coronary artery  calcification is noted.  2.  Lungs are clear. No enlarged lymph nodes. Heart is enlarged. No  pleural effusions.    WILDA TRIPLETT MD   Troponin I   Result Value Ref Range    Troponin I ES <0.015 0.000 - 0.045 ug/L       Medications   acetaminophen (TYLENOL) tablet 650 mg (has no administration in time range)   ondansetron (ZOFRAN-ODT) ODT tab 4 mg (has no administration in time range)     Or   ondansetron (ZOFRAN) injection 4 mg (has no administration in time range)   iopamidol (ISOVUE-370) solution 76 mL (76 mLs Intravenous Given 9/5/20 0854)   sodium chloride 0.9 % bag 500mL for CT scan flush use (100 mLs Intravenous Given 9/5/20 0854)   aspirin (ASA) chewable tablet 324 mg (324 mg Oral Given 9/5/20 1013)   acetaminophen (TYLENOL) tablet 500 mg (500 mg Oral Given 9/5/20 1013)   pantoprazole (PROTONIX) 40 mg IV push injection (40 mg Intravenous Given 9/5/20 1013)       Assessments & Plan (with Medical Decision Making)     Pt is a 80 year old male with history of coronary artery disease hypertension, type 2 diabetes mellitus, CVA, atrial fibrillation who presents with complaints of chest pain since 2 AM this morning.  Patient states he was sitting in his recliner watching a movie when he states he started to develop abdominal pain that progressed into his chest.  He states the pain is remained constant throughout his chest since that time but has lessened in severity.  He describes the pain as heavy and tight.  Patient denies any associated shortness of breath.  He states that the pain feels constant with his past episodes of angina.  He states he did note he fatigued easier than usual while mowing his lawn 3 days ago.  He denies any associated chest pain  at that time.  Patient had an EP ablation of atrial flutter in 2014.  Patient follows with cardiology at [facility].  Patient is on Eliquis.  Patient does admit to being somewhat inconsistent with taking his medications and upon further questioning states he has not taken his 81 mg Aspirin as prescribed for at least the past year.    Pt is afebrile on arrival.  Exam as above.  Patient was given 325 mg Aspirin.  EKG with nonspecific changes.  Troponin is undetectable.  proBNP is not elevated.  White count is 13,900.  CT scan of the chest was negative for PE.  Lungs are clear without evidence for pneumonia.  Heart is enlarged.  No pleural effusions.  Repeat EKG is unchanged from the initial EKG obtained today.  No dynamic changes.  Discussed results with patient.  Patient states he is feeling much improved and his pain has nearly resolved.  We will plan on admitting patient to the hospital for serial troponins and continued cardiac monitoring given his history of coronary artery disease.      Discussed patient's case with hospitalist on-call, Dr. Medina, who accepts the admission and care of the patient.    Discussed pt's case with the ED physician on staff (Dr. Montemayor).  He also evaluated patient and was involved in the care of the patient throughout his ED visit including assessment, diagnosis, treatment, and plan of the patient.    I have reviewed the nursing notes.    I have reviewed the findings, diagnosis, and plan with patient.       ED to Inpatient Handoff:    Discussed with Dr. Medina  Patient accepted for Observation Stay  Pending studies include None  Code Status: Full Code           Current Discharge Medication List          Final diagnoses:   Chest pain       9/5/2020   [facility] EMERGENCY DEPARTMENT      Disclaimer:  This note consists of symbols derived from keyboarding, dictation and/or voice recognition software.  As a result, there may be errors in the script that have  gone undetected.  Please consider this when interpreting information found in this chart.     Sunita Young PA-C  09/05/20 163       Sunita Young PA-C  09/05/20 1642

## 2020-09-05 NOTE — PROGRESS NOTES
"[facility] ADMISSION NOTE    Patient admitted to room 2304 at approximately 1230 via wheel chair from emergency room. Patient was accompanied by transport tech.     Verbal SBAR report received from Paula THOMAS prior to patient arrival.     Patient ambulated to bed independently. Patient alert and oriented X 3. The patient is not having any pain. 0-10 Pain Scale: 5. Admission vital signs: Blood pressure (!) 155/83, pulse 79, temperature 96.4  F (35.8  C), temperature source Oral, resp. rate 16, height 1.778 m (5' 10\"), weight 79 kg (174 lb 2.6 oz), SpO2 97 %. Patient was oriented to plan of care, call light, bed controls, tv, telephone, bathroom and visiting hours.     Risk Assessment    The following safety risks were identified during admission: none. Yellow risk band applied: NO.     Skin Initial Assessment    This writer admitted this patient and completed a full skin assessment and Inderjit score in the Adult PCS flowsheet. Appropriate interventions initiated as needed.     Secondary skin check completed by Theodora THOMAS.         Education    Patient has a [place] to Observation order: Yes  Observation education completed and documented: Yes      Bernadine Grove RN    "

## 2020-09-05 NOTE — H&P
[facility] History and Physical    Bryson Mendez MRN# [id number]   Age: 80 year old YOB: 1940     Date of Admission:  9/5/2020    Home clinic: [facility]  Primary care provider: Thien Davison          Chief Complaint:   Abdominal pain/ chest pain    History is obtained from the patient          History of Present Illness:   This patient is a 80 year old  male with a significant past medical history of coronary artery disease, diabetes, hypertension and stroke who presents with acute onset abdominal pain yesterday. Was just sitting watching TV when hestarted having severe cramps in abd last night around 2AM. He tried to walk and then sleep but was uncomfortable. Then he slept and woke up this AM-still had abdominal cramps-radiating to his chest. He though he may be having a heart attack-told his wife and was brought in here. He took 2 NTG pills at home-with no relief.   Pt having severe problems with constipation-hard stools. Last BM was 3-4 days ago-hard and small. He has been trying dulcolax at home with not much relief. Has been battling consitipation for weeks now. No n/v. No fever/chills.            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 Occurred silas-procedural while patient was having coronary angiogram for evaluation of angina. MRI revealed stenosis of left PCA, stroke was not embolic in nature. Patient was advised to start Eliquis and continue aspirin.       Neck pain 04/25/2018     Priority: Medium     Near syncope 04/25/2018     Priority: Medium     Syncope 04/25/2018     Priority: Medium     Closed right hip fracture (H) 01/25/2018     Priority: Medium     S/P hip  replacement 01/24/2018     Priority: Medium     Failed total hip arthroplasty (H) 01/19/2018     Priority: Medium     Benign essential hypertension 01/19/2018     Priority: Medium     Benign non-nodular prostatic hyperplasia without lower urinary tract symptoms 03/15/2017     Priority: Medium     Diabetes mellitus type 2 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 (H) 04/01/2016     Priority: Medium     Walls's esophagus without dysplasia 07/09/2015     Priority: Medium     No dysplasia. He will need a gastroscopy every 3 years for surveillance       A-fib (H) 07/14/2014     Priority: Medium     Diagnosed July 2014, hospitalized at [facility] and had ablation at that time.       CAD (coronary artery disease) 06/18/2013     Priority: Medium     Overview:   -7/21/07 - Acute anterior MI complicated by Vent Fib requiring electrshock  -7/21/07 - emergent angioplasty and stenting of complete occlusion of proximal LAD and Ist diagonal branch of                        LAD  -7/22/07 - angiioplasty and revascularization of left circumflex artery with stent when had recurrant chest pain  -left ventricular systolic and diastolic dysfunction with ejection fraction - 30 %  -11/8/07 - coronary angiography shows mod in-stent stenosis of the LAD, patent circumflex stent, and mod right coronary disease              Angiogram on 12-5-08, previously placed proximal and mid left anterior descending stent to be widely patent as previously placed, first diagnosed had a 30-40% restenosis which was unchanged from his May 2008 study. His previously placed left circumflex stent was also widely patent. There is also PDA with an ostial 70% lesion which was unchanged.  12-4-08 LV systolic function is at the lower limits of normal with visually estimated ejection fraction of 50 %. Regional wall motion is normal. Mild Left atrial enlargement. Mild mitral and tricuspid regurgitation. Pulmonary artery pressure estimated at 26 + RAP.  MI in 2007, stenting of LAD, left  circumflex-- multiple stents  Coronary angiogram 2/7/18 at [place]:  DIAGNOSTIC - CORONARY   Right dominant coronary artery system   The left main artery was normal in appearance and free of obstructive disease.   The previously deployed stent in the proximal LAD artery is  patent. 50-70% lesion mid LAD distal to stent   The previously deployed stent in the proximal 1st diagonal artery is  patent   50-60% stenosis in the proximal Circumflex, proximal to stent   The previously deployed stent in the mid circumflex artery is widely patent   The RCA has moderate disease. Appears unchanged since 2007   Baseline Pd/Pa measured in circumflex was 1.00    Baseline Pd/Pa measured in mid LAD was 0.96   No intervention indicated  LEFT VENTRICULAR FUNCTION   Left ventricular function is normal.   LVEDP elevated at 27 mmHg  RECOMMENDATIONS & PLAN   Medical Rx       Pre-diabetes 06/18/2013     Priority: Medium     MAIDA (obstructive sleep apnea) 06/18/2013     Priority: Medium     Uses CPAP       Osteoarthritis of hip 06/17/2013     Priority: Medium     Do you wish to do the replacement in the background? yes         S/P PTCA (percutaneous transluminal coronary angioplasty) 06/01/2011     Priority: Medium     Overview:   - 7/21/07 - two vessels  -7/23/07 - one vessel  -5/13/08 -one vessel       Mixed hyperlipidemia 09/10/2007     Priority: Medium     Acute myocardial infarction of anterolateral wall (H) 09/06/2007     Priority: Medium     Overview:   4/29/2008 Stress test. Large area of apical, anterior, anteroseptal and anteroloateral ischemia. Left anterior decemding coronary artery distribution is suggested. FUNCTION: Mildly reduced. Resting wall motion abnormalities were detected. Ejection Fraction is 42%. 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 COMMENTS: Compared to study of 10/2/2007, changes have  occurred. The area of ischemia is larger.  5/13/2008 Cardiac Catheterization Report-85% stenosis in the proximal LAD, stent in the prozimal LAD aartery is widely patent-stent in the mid circumflex artery is widely patent.  Intervention-successful stenting (drug eluting) of the proximal LAD  12/3/08 Patient underwent an angiogram on December 5, 2008 which had previously place proximal and mid left anterior descending stent to be widely patent as previously placed. First diagnosed 30-40% restenosis which was unchanged from his may 2008 study. His previously placed left circumflex stent was also widely patent. There is also a PDA with an ostial 70% lesion which was unchanged.  12/4/2008 1. LV systolic function is at the lower limits of normal with visually estimated ejection fraction of 50 %. 2. Regional wall motion is normal. 3. Pulmonary artery pressure is estimated at 26 mmhg + RAP. 4. Mild left atrial enlargement.       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     Priority: Medium               Past Surgical History:      Past Surgical History:   Procedure Laterality Date     ARTHROPLASTY HIP  6/10/2011    Procedure:ARTHROPLASTY HIP; Minimally Invasive Surgery,J&J; Surgeon:DAVID REICH; Location:[facility]     ARTHROPLASTY HIP  6/17/2013    Procedure: ARTHROPLASTY HIP;  Right Total Hip Arthroplasty;  Surgeon: David Reich MD;  Location: [facility]     ARTHROPLASTY REVISION HIP Right 1/19/2018    Procedure: ARTHROPLASTY REVISION HIP;  Right Hip Acetabulum Revision;  Surgeon: David Reich MD;  Location: [facility]     CARDIAC SURGERY      states he has 5 stents     COLONOSCOPY  3/28/2011    COLONOSCOPY performed by KYARA SIMMONS at [facility]     ESOPHAGOSCOPY, GASTROSCOPY, DUODENOSCOPY (EGD), COMBINED N/A 7/7/2015    Procedure: COMBINED ESOPHAGOSCOPY, GASTROSCOPY, DUODENOSCOPY (EGD), BIOPSY SINGLE OR MULTIPLE;  Surgeon: Jody Conner MD;  Location: [facility]     H ABLATION ATRIAL FLUTTER   7/15/14     SURGICAL HISTORY OF -       UPP for snoring     VASCULAR SURGERY       VASECTOMY               Social History:     Social History     Socioeconomic History     Marital status:      Spouse name: Not on file     Number of children: Not on file     Years of education: Not on file     Highest education level: Not on file   Occupational History     Not on file   Social Needs     Financial resource strain: Not on file     Food insecurity     Worry: Not on file     Inability: Not on file     Transportation needs     Medical: Not on file     Non-medical: Not on file   Tobacco Use     Smoking status: Former Smoker     Last attempt to quit: 1971     Years since quittin.4     Smokeless tobacco: Never Used   Substance and Sexual Activity     Alcohol use: Yes     Comment: occasional - 2-3 drinks per month     Drug use: No     Sexual activity: Not on file   Lifestyle     Physical activity     Days per week: Not on file     Minutes per session: Not on file     Stress: Not on file   Relationships     Social connections     Talks on phone: Not on file     Gets together: Not on file     Attends Latter day service: Not on file     Active member of club or organization: Not on file     Attends meetings of clubs or organizations: Not on file     Relationship status: Not on file     Intimate partner violence     Fear of current or ex partner: Not on file     Emotionally abused: Not on file     Physically abused: Not on file     Forced sexual activity: Not on file   Other Topics Concern     Parent/sibling w/ CABG, MI or angioplasty before 65F 55M? Not Asked   Social History Narrative    , lives with his wife.             Family History:     Family History   Problem Relation Age of Onset     Coronary Artery Disease Father         MI     Colon Cancer Mother      Cancer Brother      Rheumatoid Arthritis Sister      Heart Disease Sister              Allergies:     Allergies   Allergen Reactions      Lisinopril Difficulty breathing     Fever, Redness             Medications:     Prior to Admission medications    Medication Sig Last Dose Taking? Auth Provider   Apixaban (ELIQUIS PO) Take 5 mg by mouth 2 times daily  9/4/2020 at am Yes Reported, Patient   ASPIRIN ADULT LOW STRENGTH PO Take 81 mg by mouth daily 9/4/2020 at am Yes Reported, Patient   bisacodyl (DULCOLAX) 5 MG EC tablet Take by mouth daily Take 1 tab day 1, 2 tabs day 2 and 3 tabs day 3, alternating. 9/4/2020 at 2  tabs Yes Reported, Patient   Cholecalciferol (VITAMIN D3 PO) Take 5,000 Units by mouth twice a week On Sunday and Wednesday 9/2/2020 at am Yes Reported, Patient   losartan (COZAAR) 50 MG tablet Take 25 mg by mouth daily  9/3/2020 at am Yes Reported, Patient   metoprolol succinate ER (TOPROL-XL) 25 MG 24 hr tablet Take 25 mg by mouth daily 9/4/2020 at am maybe Yes Reported, Patient   Multiple Vitamins-Minerals (MULTIVITAL PO) Take 1 tablet by mouth every evening  9/3/2020 at pm Yes Reported, Patient   Multiple Vitamins-Minerals (PRESERVISION/LUTEIN PO) Take 1 capsule by mouth 2 times daily TEBS - Eye and Body Support 9/4/2020 at am Yes Reported, Patient   nitroglycerin (NITROSTAT) 0.4 MG SL tablet Place 0.4 mg under the tongue every 5 minutes as needed for chest pain  9/5/2020 at 0415 x 3 doses Yes Chuck Hernandez MD   Omega-3 Fatty Acids (OMEGA 3 PO) Take 1 capsule by mouth 2 times daily  9/3/2020 at pm Yes Reported, Patient   omeprazole (PRILOSEC) 40 MG capsule Take 40 mg by mouth daily  9/3/2020 at am Yes Reported, Patient   order for DME At Bedtime CPAP machine for home use at pressure: 8-14 cmw , Heated humidifier x 1 q 5yr, water chamber x 1 q 6 mo, chin strap x 1 q 6 mo, Full Face Mask x 1 q 3mo, Full face cushion x 1 q mo, Heated PAP tubing x 1 q 3 mo, Headgear x 1 q 6 mo, non-disposable filters x 1 q 6 mo, disposable filter x 2 q mo;  Yes Reported, Patient   oxybutynin ER (DITROPAN XL) 15 MG 24 hr tablet Take 15 mg by mouth  "daily  9/4/2020 at am Yes Reported, Patient   simvastatin (ZOCOR) 40 MG tablet Take 40 mg by mouth At Bedtime 9/3/2020 at hs Yes Reported, Patient   acetaminophen (TYLENOL) 325 MG tablet Take 2 tablets (650 mg) by mouth every 4 hours as needed for other (surgical pain) 0 today  Ene Eugene PA-C            Review of Systems:   The Review of Systems is negative in ALL other than noted in the HPI          Physical Exam:   Blood pressure (!) 140/61, pulse 67, temperature 97.9  F (36.6  C), temperature source Oral, resp. rate 18, height 1.778 m (5' 10\"), weight 79 kg (174 lb 2.6 oz), SpO2 97 %.  GENERAL APPEARANCE: healthy, alert and no distress  EYES: conjunctiva clear, eyes grossly normal  HENT: external ears and nose normal   NECK: supple, no masses or adenopathy  RESP: lungs clear to auscultation - no rales, rhonchi or wheezes  CV: regular rate and rhythm, normal S1 S2, no S3 or S4 and no murmur, click or rub   ABDOMEN: soft, nontender, no HSM or masses and bowel sounds normal  MS: no clubbing, cyanosis; no edema  SKIN: clear without significant rashes or lesions  NEURO: Normal strength and tone, sensory exam grossly normal, mentation intact and speech normal         Data:     Lab Results   Component Value Date    WBC 13.9 (H) 09/05/2020    HGB 16.5 09/05/2020    HCT 47.9 09/05/2020    MCV 92 09/05/2020     09/05/2020     Lab Results   Component Value Date     09/05/2020    CO2 29 09/05/2020     Lab Results   Component Value Date    BUN 11 09/05/2020     No components found for: SEDRATE  No components found for: DDIMER  No results found for: BNP  Lab Results   Component Value Date    TSH 0.91 07/14/2014     Lab Results   Component Value Date    TROPONIN <0.04 11/07/2007     UA RESULTS:  Recent Labs   Lab Test 04/25/18  2130   COLOR Yellow   APPEARANCE Clear   URINEGLC 50*   URINEBILI Negative   URINEKETONE 5*   SG 1.028   UBLD Negative   URINEPH 5.0   PROTEIN Negative   NITRITE Negative "   LEUKEST Negative   RBCU 1   WBCU 1     Liver Function Studies -   Recent Labs   Lab Test 09/05/20  0810   PROTTOTAL 7.6   ALBUMIN 4.0   BILITOTAL 1.4*   ALKPHOS 78   AST 27   ALT 26       EKG results:  SR no ST-T changes    RADIOLOGY:  CT chest PE-IMPRESSION:  1.  No evidence of pulmonary embolism. Thoracic aorta demonstrates  calcified plaque without aneurysm. Extensive coronary artery  calcification is noted.  2.  Lungs are clear. No enlarged lymph nodes. Heart is enlarged. No  pleural effusions    Xray abd-IMPRESSION: No evidence for small bowel obstruction. Prominent stool  identified throughout the colon. No free air. Bilateral hip  arthroplasties     A/P  SEVERE CONSTIPATION  Presenting with acute cramping abdominal pain, h/o severe constipation with hard stools-last BM 4 days ago. Pt trying dulcolax with no results. Stat abd xray as above.  Will give  magcitrate stat. If not much stool , could repeat or do golytely prep. Pt would need to be on miralax, senna on discharge.     CHEST PAIN-h/o CAD  This is abdominal pain radiating to chest due to severe constipation. Has no EKG changes, negative trops.   Stop monitoring trops, discharge tele. Continue home meds    HTN  Continue meds    CHRONIC AFIB  Continue eliquis/ metoprolol    GERD  Continue meds    HLD  Continue meds    ASYMPTOMATIC COVID TESTING  In cv pandemic. Low risk. Would not retest if negative    DVT PROF-ON RICH Condon MD  [phone]

## 2020-09-06 VITALS
BODY MASS INDEX: 24.93 KG/M2 | SYSTOLIC BLOOD PRESSURE: 159 MMHG | DIASTOLIC BLOOD PRESSURE: 77 MMHG | RESPIRATION RATE: 16 BRPM | OXYGEN SATURATION: 93 % | WEIGHT: 174.16 LBS | HEIGHT: 70 IN | HEART RATE: 60 BPM | TEMPERATURE: 97.5 F

## 2020-09-06 LAB
SARS-COV-2 RNA SPEC QL NAA+PROBE: NOT DETECTED
SPECIMEN SOURCE: NORMAL

## 2020-09-06 PROCEDURE — 99217 ZZC OBSERVATION CARE DISCHARGE: CPT | Performed by: FAMILY MEDICINE

## 2020-09-06 PROCEDURE — 25000132 ZZH RX MED GY IP 250 OP 250 PS 637: Performed by: INTERNAL MEDICINE

## 2020-09-06 PROCEDURE — G0378 HOSPITAL OBSERVATION PER HR: HCPCS

## 2020-09-06 RX ORDER — POLYETHYLENE GLYCOL 3350 17 G/17G
17 POWDER, FOR SOLUTION ORAL DAILY
Qty: 30 PACKET | Refills: 3 | Status: SHIPPED | OUTPATIENT
Start: 2020-09-06 | End: 2020-10-06

## 2020-09-06 RX ORDER — SENNOSIDES 8.6 MG
1-2 TABLET ORAL 2 TIMES DAILY
Qty: 60 TABLET | Refills: 1 | Status: SHIPPED | OUTPATIENT
Start: 2020-09-06 | End: 2020-10-12

## 2020-09-06 RX ORDER — OMEPRAZOLE 40 MG/1
40 CAPSULE, DELAYED RELEASE ORAL DAILY
COMMUNITY
Start: 2020-09-06

## 2020-09-06 RX ADMIN — METOPROLOL SUCCINATE 25 MG: 25 TABLET, EXTENDED RELEASE ORAL at 09:50

## 2020-09-06 RX ADMIN — OMEPRAZOLE 40 MG: 20 CAPSULE, DELAYED RELEASE ORAL at 09:50

## 2020-09-06 RX ADMIN — ASPIRIN 81 MG: 81 TABLET, COATED ORAL at 09:51

## 2020-09-06 RX ADMIN — APIXABAN 5 MG: 5 TABLET, FILM COATED ORAL at 09:50

## 2020-09-06 RX ADMIN — LOSARTAN POTASSIUM 25 MG: 25 TABLET, FILM COATED ORAL at 09:52

## 2020-09-06 RX ADMIN — POLYETHYLENE GLYCOL 3350 17 G: 17 POWDER, FOR SOLUTION ORAL at 09:49

## 2020-09-06 RX ADMIN — SENNOSIDES 1 TABLET: 8.6 TABLET, FILM COATED ORAL at 09:50

## 2020-09-06 NOTE — PROGRESS NOTES
Patient is alert and oriented. Reports increased abdominal and chest discomfort this evening after eating a full dinner. Trops negative.  On tele - SR with BBB.  Mag citrate given for constipation, if no or minimal result from this orders to give golytely.

## 2020-09-06 NOTE — PROGRESS NOTES
[facility] DISCHARGE NOTE    Patient discharged to home at 2:33 PM via ambulation. Accompanied by spouse and staff. Discharge instructions reviewed with patient and spouse, opportunity offered to ask questions. Prescriptions sent to patients preferred pharmacy. All belongings sent with patient.    Bernadine Grove RN

## 2020-09-06 NOTE — PLAN OF CARE
Patient A & O. Up independently. Patient received Miralax, Senna, and part of a jug of Golytely. At 2233 patient had large BM followed by multiple small and medium BMs over night. Reports feeling better.

## 2020-09-06 NOTE — PLAN OF CARE
Vital signs stable.  Patient reports he had another stool today, watery.  Denies any chest pain at this time.  Denies abdominal pain, states he just feels some lingering abdominal fullness.  Tolerating regular diet well.

## 2020-09-06 NOTE — DISCHARGE SUMMARY
[facility] Discharge Summary    Bryson Mendez MRN# [id number]   Age: 80 year old YOB: 1940     Date of Admission:  9/5/2020  Date of Discharge::  9/6/2020  Admitting Physician:  Manuel Medina MD  Discharge Physician:  Melvin Casey MD, MD             Admission Diagnoses:   Chest pain [R07.9]          Principle Discharge Diagnosis:         SEVERE CONSTIPATION    See hospital course for further active diagnoses addressed during this admission.            Procedures:   No procedures performed during this admission          Medications Prior to Admission:     Medications Prior to Admission   Medication Sig Dispense Refill Last Dose     Apixaban (ELIQUIS PO) Take 5 mg by mouth 2 times daily    9/4/2020 at am     ASPIRIN ADULT LOW STRENGTH PO Take 81 mg by mouth daily   9/4/2020 at am     bisacodyl (DULCOLAX) 5 MG EC tablet Take by mouth daily Take 1 tab day 1, 2 tabs day 2 and 3 tabs day 3, alternating.   9/4/2020 at 2  tabs     Cholecalciferol (VITAMIN D3 PO) Take 5,000 Units by mouth twice a week On Sunday and Wednesday 9/2/2020 at am     losartan (COZAAR) 50 MG tablet Take 25 mg by mouth daily    9/3/2020 at am     metoprolol succinate ER (TOPROL-XL) 25 MG 24 hr tablet Take 25 mg by mouth daily   9/4/2020 at am maybe     Multiple Vitamins-Minerals (MULTIVITAL PO) Take 1 tablet by mouth every evening    9/3/2020 at pm     Multiple Vitamins-Minerals (PRESERVISION/LUTEIN PO) Take 1 capsule by mouth 2 times daily TEBS - Eye and Body Support   9/4/2020 at am     nitroglycerin (NITROSTAT) 0.4 MG SL tablet Place 0.4 mg under the tongue every 5 minutes as needed for chest pain  25 tablet  9/5/2020 at 0415 x 3 doses     Omega-3 Fatty Acids (OMEGA 3 PO) Take 1 capsule by mouth 2 times daily    9/3/2020 at pm     order for DME At Bedtime CPAP machine for home use at pressure: 8-14 cmw , Heated humidifier x 1 q 5yr, water chamber x 1 q 6 mo, chin strap x 1 q 6 mo, Full Face Mask x 1 q 3mo, Full  face cushion x 1 q mo, Heated PAP tubing x 1 q 3 mo, Headgear x 1 q 6 mo, non-disposable filters x 1 q 6 mo, disposable filter x 2 q mo;        oxybutynin ER (DITROPAN XL) 15 MG 24 hr tablet Take 15 mg by mouth daily    9/4/2020 at am     simvastatin (ZOCOR) 40 MG tablet Take 40 mg by mouth At Bedtime   9/3/2020 at hs     acetaminophen (TYLENOL) 325 MG tablet Take 2 tablets (650 mg) by mouth every 4 hours as needed for other (surgical pain) 100 tablet 0 0 today     [DISCONTINUED] omeprazole (PRILOSEC) 40 MG capsule Take 40 mg by mouth daily    9/3/2020 at am             Discharge Medications:     Current Discharge Medication List      START taking these medications    Details   polyethylene glycol (MIRALAX) 17 g packet Take 17 g by mouth daily  Qty: 30 packet, Refills: 3    Associated Diagnoses: Constipation, unspecified constipation type      sennosides (SENOKOT) 8.6 MG tablet Take 1-2 tablets by mouth 2 times daily  Qty: 60 tablet, Refills: 1    Associated Diagnoses: Constipation, unspecified constipation type         CONTINUE these medications which have CHANGED    Details   omeprazole (PRILOSEC) 40 MG DR capsule Take twice daily until follow-up with primary care provider on Tues, likely return to once daily thereafter pending their recommendations         CONTINUE these medications which have NOT CHANGED    Details   Apixaban (ELIQUIS PO) Take 5 mg by mouth 2 times daily       ASPIRIN ADULT LOW STRENGTH PO Take 81 mg by mouth daily      bisacodyl (DULCOLAX) 5 MG EC tablet Take by mouth daily Take 1 tab day 1, 2 tabs day 2 and 3 tabs day 3, alternating.      Cholecalciferol (VITAMIN D3 PO) Take 5,000 Units by mouth twice a week On Sunday and Wednesday      losartan (COZAAR) 50 MG tablet Take 25 mg by mouth daily       metoprolol succinate ER (TOPROL-XL) 25 MG 24 hr tablet Take 25 mg by mouth daily      Multiple Vitamins-Minerals (MULTIVITAL PO) Take 1 tablet by mouth every evening       Multiple Vitamins-Minerals  (PRESERVISION/LUTEIN PO) Take 1 capsule by mouth 2 times daily TEBS - Eye and Body Support      nitroglycerin (NITROSTAT) 0.4 MG SL tablet Place 0.4 mg under the tongue every 5 minutes as needed for chest pain   Qty: 25 tablet      Omega-3 Fatty Acids (OMEGA 3 PO) Take 1 capsule by mouth 2 times daily       order for DME At Bedtime CPAP machine for home use at pressure: 8-14 cmw , Heated humidifier x 1 q 5yr, water chamber x 1 q 6 mo, chin strap x 1 q 6 mo, Full Face Mask x 1 q 3mo, Full face cushion x 1 q mo, Heated PAP tubing x 1 q 3 mo, Headgear x 1 q 6 mo, non-disposable filters x 1 q 6 mo, disposable filter x 2 q mo;      oxybutynin ER (DITROPAN XL) 15 MG 24 hr tablet Take 15 mg by mouth daily       simvastatin (ZOCOR) 40 MG tablet Take 40 mg by mouth At Bedtime      acetaminophen (TYLENOL) 325 MG tablet Take 2 tablets (650 mg) by mouth every 4 hours as needed for other (surgical pain)  Qty: 100 tablet, Refills: 0    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 Was just sitting watching TV when hestarted having severe cramps in abd last night around 2AM. He tried to walk and then sleep but was uncomfortable. Then he slept and woke up this AM-still had abdominal cramps-radiating to his chest. He though he may be having a heart attack-told his wife and was brought in here. He took 2 NTG pills at home-with no relief.   Pt having severe problems with constipation-hard stools. Last BM was 3-4 days ago-hard and small. He has been trying dulcolax at home with not much relief. Has been battling consitipation for weeks now. No n/v. No fever/chills.             TODAY:     Subjective:  Doing well, has had multiple bowel movements, soft to runny  "since admission. Pain completely resolved.  Still felt slightly full after lunch, but no nausea and no pain or cramping.  Feels good now.  No fever or chills. No chest pain or pressure, no dyspnea.  Feels ready for discharge home today.   No other pain.       ROS:   ROS: 10 point ROS neg other than the symptoms noted above in the HPI.   BP (!) 159/77 (BP Location: Left arm)   Pulse 60   Temp 97.5  F (36.4  C) (Oral)   Resp 16   Ht 1.778 m (5' 10\")   Wt 79 kg (174 lb 2.6 oz)   SpO2 93%   BMI 24.99 kg/m     EXAM:  General: awake and alert, NAD, oriented x 3  Head: normocephalic  Neck: unremarkable, no lymphadenopathy   HEENT: oropharynx pink and moist    Heart: Regular rate and rhythm, no murmurs, rubs, or gallops  Lungs: clear to auscultation bilaterally with good air movement throughout  Abdomen: soft, non-tender, no masses or organomegaly, good bowel sounds throughout, non-distended.    Extremities: no edema in lower extremities   Skin unremarkable.            Hospital Course:     SEVERE CONSTIPATION  Presenting with acute cramping abdominal pain, h/o severe constipation with hard stools-last BM 4 days ago. Pt trying dulcolax with no results. Stat abd xray as above.  Will give  magcitrate stat. Did well with this with good bowel movements and complete resolution of symptoms.  Ok for discharge home, plan to be on miralax daily with senna twice daily to start on discharge. Follow-up with primary care provider on Tuesday as planned - reassess bowel regimen and omeprazole as below at that time.     GERD  Takes daily omeprazole - suspect exacerbation of this from his severe constipation was a small part of his symptom, recommend increasing to twice daily for now, reassess at follow-up with primary care provider on Tuesday and if doing well can return to once daily.       Initially thought to perhaps had chest pain, later determined to be just abdominal pain  h/o CAD  This is abdominal pain radiating to chest due " to severe constipation. Has no EKG changes, negative trops.   Stop monitoring trops, discharge tele. Continue home meds     HTN  Continue meds     CHRONIC AFIB  Continue eliquis/ metoprolol     HLD  Continue meds    Asymptomatic COVID screen pending on discharge - follow-up on final results at follow-up with primary care provider on Tues.                          Discharge Instructions and Follow-Up:     Discharge diet: Orders Placed This Encounter      Consistent Carbohydrate Diet 5435-2018 Calories: Moderate Consistent CHO (4-6 CHO units/meal)       Discharge activity: Activity as tolerated   Discharge follow-up: Follow up with primary care provider on Tues as planned.  Reassess bowel regimen and reassess omeprazole dosage at that time.               Discharge Disposition:     Discharged to home      Attestation:  I have reviewed today's vital signs, notes, medications, labs and imaging.  Amount of time performed on this discharge summary: 40 minutes.    Melvin Casey MD, MD

## 2020-10-12 ENCOUNTER — VIRTUAL VISIT (OUTPATIENT)
Dept: NEUROLOGY | Facility: CLINIC | Age: 80
End: 2020-10-12
Payer: MEDICARE

## 2020-10-12 DIAGNOSIS — M79.671 BILATERAL FOOT PAIN: Primary | ICD-10-CM

## 2020-10-12 DIAGNOSIS — M79.672 BILATERAL FOOT PAIN: Primary | ICD-10-CM

## 2020-10-12 PROCEDURE — 99203 OFFICE O/P NEW LOW 30 MIN: CPT | Mod: 95 | Performed by: PSYCHIATRY & NEUROLOGY

## 2020-10-12 RX ORDER — POLYETHYLENE GLYCOL 3350 17 G/17G
1 POWDER, FOR SOLUTION ORAL DAILY
COMMUNITY

## 2020-10-12 SDOH — HEALTH STABILITY: MENTAL HEALTH: HOW OFTEN DO YOU HAVE A DRINK CONTAINING ALCOHOL?: 2-4 TIMES A MONTH

## 2020-10-12 SDOH — HEALTH STABILITY: MENTAL HEALTH: HOW OFTEN DO YOU HAVE 6 OR MORE DRINKS ON ONE OCCASION?: NEVER

## 2020-10-12 SDOH — HEALTH STABILITY: MENTAL HEALTH: HOW MANY STANDARD DRINKS CONTAINING ALCOHOL DO YOU HAVE ON A TYPICAL DAY?: 1 OR 2

## 2020-10-12 NOTE — NURSING NOTE
AVS mailed to patient.    A copy of this visit and facesheet faxed to Jaja at Dr. Khan' office [phone].  Transmission confirmed via Right Fax.

## 2020-10-12 NOTE — PATIENT INSTRUCTIONS
Patient Instructions:  -- Referral for nerve conduction studies/EMG. We will notify you of the results and let you know if additional tests are warranted, based on the results.  -- Neurology clinic appointment in 6-8 weeks. Please request 40 minute visit, since this will be our first time meeting in clinic.

## 2020-10-12 NOTE — PROGRESS NOTES
Physician Note:    Mr. Mendez is an 81 yo man whom I have been asked by Dr. Whaley (Cardiology) to see in consultation for bilateral foot pain.    Care Everywhere reviewed. Per Dr. Whaley's 7.29.20 note:  With respect to his foot pain, we shall make a referral for neurologic assessment for possible peripheral neuropathy.    If he is not felt to have a peripheral neuropathy, we can consider proceeding with lower extremity angiography to see if he has any significant pedal vascular disease to account for his symptoms. Isolated distal pedal vascular disease can occur in a subset of diabetic patients and cannot be readily picked up by noninvasive testing. The only clue that we have is a slightly diminished toe brachial index bilaterally which may indicate such underlying disease. However, even if we were to establish this by angiography, getting durable long-term relief would be impossible with revascularization since restenosis/reocclusion rates in the pedal segment are very high. For this reason, angiography is likely to only help with establishing a clear diagnosis but not so much with his management.     The patient has additional history of history of coronary artery disease, diabetes, hypertension, MAIDA, chronic LBP and stroke. He is on Eliquis. He clarifies that he made some dietary changes and his HbA1c hs since been good, no medication treatments have been necessary for diabetes. HbA1c was 5.4%in 8.2020.    Romeo tells me that he has long had problems with the lower back and neck pain, with some pain radiating into the hips. More recently he has developed burning pain in the feet (L>R). He does have his feet checked every other month (podiatry?). He does not notice the pain is necessarily worse at any certain time, though he does notice it bothers him at night and if he dangles the Left foot over the side of the bed, he notices the pain dissipates for a while. He has not noticed any weakness in the feet. He  stumbles occasionally, but no falls. He has some arthritic changes in his hands that cause pain/stiffness.     He does see a chiropractor for treatments and this helps with his back and neck issues.     Past Medical History:   Diagnosis Date     A-fib (H)      Arthritis      Chronic low back pain      Chronic pain      Coronary artery disease      Depression      Diabetes mellitus (H)     diet controlled     History of stroke      Hypertension      Family History   Problem Relation Age of Onset     Coronary Artery Disease Father         MI     Colon Cancer Mother      Cancer Brother      Rheumatoid Arthritis Sister      Heart Disease Sister      Social History     Tobacco Use     Smoking status: Former Smoker     Quit date: 1971     Years since quittin.5     Smokeless tobacco: Never Used   Substance Use Topics     Alcohol use: Yes     Frequency: 2-4 times a month     Drinks per session: 1 or 2     Binge frequency: Never     Comment: occasional - 2-3 drinks per month     Drug use: No     Current Outpatient Medications   Medication     acetaminophen (TYLENOL) 325 MG tablet     Apixaban (ELIQUIS PO)     ASPIRIN ADULT LOW STRENGTH PO     Cholecalciferol (VITAMIN D3 PO)     losartan (COZAAR) 50 MG tablet     metoprolol succinate ER (TOPROL-XL) 25 MG 24 hr tablet     Multiple Vitamins-Minerals (MULTIVITAL PO)     Multiple Vitamins-Minerals (PRESERVISION/LUTEIN PO)     nitroglycerin (NITROSTAT) 0.4 MG SL tablet     Omega-3 Fatty Acids (OMEGA 3 PO)     omeprazole (PRILOSEC) 40 MG DR capsule     oxybutynin ER (DITROPAN XL) 15 MG 24 hr tablet     polyethylene glycol (MIRALAX) 17 g packet     simvastatin (ZOCOR) 40 MG tablet     order for DME     No current facility-administered medications for this visit.      EXAM: Alert, attentive. Normal facial movements. Coordination in upper extremities is normal. Adequate fund of knowledge.    Assessment and Plan:  Encounter Diagnosis   Name Primary?     Bilateral foot pain Yes          Mr. Mendez is a pleasant 81 yo man whom I have been asked to see in consultation for bilateral foot pain. Exam is limited today, given that it was done virtually. Romeo does describe burning pain the the feet that could be consistent with neuropathy. We discussed the potential causes, beyond just diabetes. I am referring him on for nerve conduction studies/EMG to start. He understands and agrees with the plan.    Patient Instructions:  -- Referral for nerve conduction studies/EMG. We will notify you of the results and let you know if additional tests are warranted, based on the results.  -- Neurology clinic appointment in 6-8 weeks. Please request 40 minutes visit, since this will be our first time meeting in clinic.    Video duration: 15 minutes. Additional 15 minutes spent in chart review and documentation by me.    Tresa Carrillo MD  Neurology    CC: MERLINE Farrell

## 2020-10-12 NOTE — PROGRESS NOTES
"Bryson Mendez is a 80 year old male who is being evaluated via a billable video visit.      The patient has been notified of following:     \"This video visit will be conducted via a call between you and your physician/provider. We have found that certain health care needs can be provided without the need for an in-person physical exam.  This service lets us provide the care you need with a video conversation.  If a prescription is necessary we can send it directly to your pharmacy.  If lab work is needed we can place an order for that and you can then stop by our lab to have the test done at a later time.    Video visits are billed at different rates depending on your insurance coverage.  Please reach out to your insurance provider with any questions.    If during the course of the call the physician/provider feels a video visit is not appropriate, you will not be charged for this service.\"    Patient has given verbal consent for Video visit? Yes  How would you like to obtain your AVS? Mail a copy  If you are dropped from the video visit, the video invite should be resent to: Send to e-mail at: [email or web address]  Will anyone else be joining your video visit? No      Will be doing video visit through [employer].  Please email link to [email or web address]    Video-Visit Details    Type of service:  Video Visit    Video Start Time: 10:50 AM  Video End Time: 11:05 AM    Originating Location (pt. Location): Home    Distant Location (provider location):  [facility] NEUROLOGY [facility]     Platform used for Video Visit: [employer]    Tresa Carrillo MD        "

## 2020-10-12 NOTE — LETTER
"    10/12/2020         RE: Bryson Mendez  [address]  [facility] [place] [address]        Dear Colleague,    Thank you for referring your patient, Bryson Mendez, to the [facility] NEUROLOGY [facility]. Please see a copy of my visit note below.    Bryson Mendez is a 80 year old male who is being evaluated via a billable video visit.      The patient has been notified of following:     \"This video visit will be conducted via a call between you and your physician/provider. We have found that certain health care needs can be provided without the need for an in-person physical exam.  This service lets us provide the care you need with a video conversation.  If a prescription is necessary we can send it directly to your pharmacy.  If lab work is needed we can place an order for that and you can then stop by our lab to have the test done at a later time.    Video visits are billed at different rates depending on your insurance coverage.  Please reach out to your insurance provider with any questions.    If during the course of the call the physician/provider feels a video visit is not appropriate, you will not be charged for this service.\"    Patient has given verbal consent for Video visit? Yes  How would you like to obtain your AVS? Mail a copy  If you are dropped from the video visit, the video invite should be resent to: Send to e-mail at: [email or web address]  Will anyone else be joining your video visit? No      Will be doing video visit through [employer]  Please email link to [email or web address]    Video-Visit Details    Type of service:  Video Visit    Video Start Time: 10:50 AM  Video End Time: 11:05 AM    Originating Location (pt. Location): Home    Distant Location (provider location):  [facility] NEUROLOGY [facility]     Platform used for Video Visit: Fabiana Carrillo MD          Physician Note:    Mr. Mendez is an 79 yo man whom " I have been asked by Dr. Whaley (Cardiology) to see in consultation for bilateral foot pain.    Care Everywhere reviewed. Per Dr. Whaley's 7.29.20 note:  With respect to his foot pain, we shall make a referral for neurologic assessment for possible peripheral neuropathy.    If he is not felt to have a peripheral neuropathy, we can consider proceeding with lower extremity angiography to see if he has any significant pedal vascular disease to account for his symptoms. Isolated distal pedal vascular disease can occur in a subset of diabetic patients and cannot be readily picked up by noninvasive testing. The only clue that we have is a slightly diminished toe brachial index bilaterally which may indicate such underlying disease. However, even if we were to establish this by angiography, getting durable long-term relief would be impossible with revascularization since restenosis/reocclusion rates in the pedal segment are very high. For this reason, angiography is likely to only help with establishing a clear diagnosis but not so much with his management.     The patient has additional history of history of coronary artery disease, diabetes, hypertension, MAIDA, chronic LBP and stroke. He is on Eliquis. He clarifies that he made some dietary changes and his HbA1c hs since been good, no medication treatments have been necessary for diabetes. HbA1c was 5.4%in 8.2020.    Romeo tells me that he has long had problems with the lower back and neck pain, with some pain radiating into the hips. More recently he has developed burning pain in the feet (L>R). He does have his feet checked every other month (podiatry?). He does not notice the pain is necessarily worse at any certain time, though he does notice it bothers him at night and if he dangles the Left foot over the side of the bed, he notices the pain dissipates for a while. He has not noticed any weakness in the feet. He stumbles occasionally, but no falls. He has some  arthritic changes in his hands that cause pain/stiffness.     He does see a chiropractor for treatments and this helps with his back and neck issues.     Past Medical History:   Diagnosis Date     A-fib (H)      Arthritis      Chronic low back pain      Chronic pain      Coronary artery disease      Depression      Diabetes mellitus (H)     diet controlled     History of stroke      Hypertension      Family History   Problem Relation Age of Onset     Coronary Artery Disease Father         MI     Colon Cancer Mother      Cancer Brother      Rheumatoid Arthritis Sister      Heart Disease Sister      Social History     Tobacco Use     Smoking status: Former Smoker     Quit date: 1971     Years since quittin.5     Smokeless tobacco: Never Used   Substance Use Topics     Alcohol use: Yes     Frequency: 2-4 times a month     Drinks per session: 1 or 2     Binge frequency: Never     Comment: occasional - 2-3 drinks per month     Drug use: No     Current Outpatient Medications   Medication     acetaminophen (TYLENOL) 325 MG tablet     Apixaban (ELIQUIS PO)     ASPIRIN ADULT LOW STRENGTH PO     Cholecalciferol (VITAMIN D3 PO)     losartan (COZAAR) 50 MG tablet     metoprolol succinate ER (TOPROL-XL) 25 MG 24 hr tablet     Multiple Vitamins-Minerals (MULTIVITAL PO)     Multiple Vitamins-Minerals (PRESERVISION/LUTEIN PO)     nitroglycerin (NITROSTAT) 0.4 MG SL tablet     Omega-3 Fatty Acids (OMEGA 3 PO)     omeprazole (PRILOSEC) 40 MG DR capsule     oxybutynin ER (DITROPAN XL) 15 MG 24 hr tablet     polyethylene glycol (MIRALAX) 17 g packet     simvastatin (ZOCOR) 40 MG tablet     order for DME     No current facility-administered medications for this visit.      EXAM: Alert, attentive. Normal facial movements. Coordination in upper extremities is normal. Adequate fund of knowledge.    Assessment and Plan:  Encounter Diagnosis   Name Primary?     Bilateral foot pain Yes         Mr. Mendez is a pleasant 79 yo man whom  I have been asked to see in consultation for bilateral foot pain. Exam is limited today, given that it was done virtually. Romeo does describe burning pain the the feet that could be consistent with neuropathy. We discussed the potential causes, beyond just diabetes. I am referring him on for nerve conduction studies/EMG to start. He understands and agrees with the plan.    Patient Instructions:  -- Referral for nerve conduction studies/EMG. We will notify you of the results and let you know if additional tests are warranted, based on the results.  -- Neurology clinic appointment in 6-8 weeks. Please request 40 minutes visit, since this will be our first time meeting in clinic.    Video duration: 15 minutes. Additional 15 minutes spent in chart review and documentation by me.    Tresa Carrillo MD  Neurology    CC: MERLINE Farrell          Again, thank you for allowing me to participate in the care of your patient.        Sincerely,        Tresa Carrillo MD

## 2020-11-17 ENCOUNTER — TRANSFERRED RECORDS (OUTPATIENT)
Dept: HEALTH INFORMATION MANAGEMENT | Facility: CLINIC | Age: 80
End: 2020-11-17

## 2020-11-20 ENCOUNTER — TELEPHONE (OUTPATIENT)
Dept: NEUROLOGY | Facility: CLINIC | Age: 80
End: 2020-11-20

## 2020-11-20 NOTE — TELEPHONE ENCOUNTER
Please let Mr. Mendez know that his nerve conduction studies/EMG results are normal.  This could mean that his symptoms are due to to a small fiber neuropathy, which is not captured on EMG.  We will discuss further evaluations when we meet for follow-up in a couple of weeks.    Thank you,  Dr. Carrillo

## 2020-12-09 ENCOUNTER — OFFICE VISIT (OUTPATIENT)
Dept: NEUROLOGY | Facility: CLINIC | Age: 80
End: 2020-12-09
Payer: MEDICARE

## 2020-12-09 VITALS — DIASTOLIC BLOOD PRESSURE: 83 MMHG | TEMPERATURE: 97.1 F | HEART RATE: 63 BPM | SYSTOLIC BLOOD PRESSURE: 155 MMHG

## 2020-12-09 DIAGNOSIS — G62.9 SMALL FIBER NEUROPATHY: Primary | ICD-10-CM

## 2020-12-09 LAB
CRP SERPL-MCNC: <2.9 MG/L (ref 0–8)
ERYTHROCYTE [SEDIMENTATION RATE] IN BLOOD BY WESTERGREN METHOD: 7 MM/H (ref 0–20)
FOLATE SERPL-MCNC: 27.1 NG/ML
VIT B12 SERPL-MCNC: 739 PG/ML (ref 193–986)

## 2020-12-09 PROCEDURE — 83921 ORGANIC ACID SINGLE QUANT: CPT | Performed by: PSYCHIATRY & NEUROLOGY

## 2020-12-09 PROCEDURE — 86334 IMMUNOFIX E-PHORESIS SERUM: CPT | Performed by: FAMILY MEDICINE

## 2020-12-09 PROCEDURE — 86235 NUCLEAR ANTIGEN ANTIBODY: CPT | Mod: 59 | Performed by: PSYCHIATRY & NEUROLOGY

## 2020-12-09 PROCEDURE — 83516 IMMUNOASSAY NONANTIBODY: CPT | Mod: 59 | Performed by: PSYCHIATRY & NEUROLOGY

## 2020-12-09 PROCEDURE — 85652 RBC SED RATE AUTOMATED: CPT | Performed by: PSYCHIATRY & NEUROLOGY

## 2020-12-09 PROCEDURE — 82607 VITAMIN B-12: CPT | Performed by: PSYCHIATRY & NEUROLOGY

## 2020-12-09 PROCEDURE — 82784 ASSAY IGA/IGD/IGG/IGM EACH: CPT | Performed by: PSYCHIATRY & NEUROLOGY

## 2020-12-09 PROCEDURE — 86140 C-REACTIVE PROTEIN: CPT | Performed by: PSYCHIATRY & NEUROLOGY

## 2020-12-09 PROCEDURE — 36415 COLL VENOUS BLD VENIPUNCTURE: CPT | Performed by: PSYCHIATRY & NEUROLOGY

## 2020-12-09 PROCEDURE — 86235 NUCLEAR ANTIGEN ANTIBODY: CPT | Performed by: PSYCHIATRY & NEUROLOGY

## 2020-12-09 PROCEDURE — 99N1036 PR STATISTIC TOTAL PROTEIN: Performed by: PSYCHIATRY & NEUROLOGY

## 2020-12-09 PROCEDURE — 83516 IMMUNOASSAY NONANTIBODY: CPT | Performed by: PSYCHIATRY & NEUROLOGY

## 2020-12-09 PROCEDURE — 84165 PROTEIN E-PHORESIS SERUM: CPT | Performed by: FAMILY MEDICINE

## 2020-12-09 PROCEDURE — 86335 IMMUNFIX E-PHORSIS/URINE/CSF: CPT | Performed by: FAMILY MEDICINE

## 2020-12-09 PROCEDURE — 99214 OFFICE O/P EST MOD 30 MIN: CPT | Performed by: PSYCHIATRY & NEUROLOGY

## 2020-12-09 PROCEDURE — 86038 ANTINUCLEAR ANTIBODIES: CPT | Performed by: PSYCHIATRY & NEUROLOGY

## 2020-12-09 PROCEDURE — 82746 ASSAY OF FOLIC ACID SERUM: CPT | Performed by: PSYCHIATRY & NEUROLOGY

## 2020-12-09 NOTE — PATIENT INSTRUCTIONS
Plan:  -- Labs for small fiber neuropathy.  We will notify you of the results.  -- Consider an evening dose of Gabapentin for the burning/pain. *Information provided.  -- Return to neurology clinic in 3-4 months.  Let us know if any concerns arise in the meantime.

## 2020-12-09 NOTE — NURSING NOTE
"Initial BP (!) 159/89 (BP Location: Left arm)   Pulse 63   Temp 97.1  F (36.2  C)  Estimated body mass index is 24.99 kg/m  as calculated from the following:    Height as of 9/5/20: 1.778 m (5' 10\").    Weight as of 9/5/20: 79 kg (174 lb 2.6 oz). .      "

## 2020-12-09 NOTE — PROGRESS NOTES
"ESTABLISHED PATIENT NEUROLOGY NOTE    DATE OF VISIT: 12/9/2020  MRN: [id number]  PATIENT NAME: Bryson Mendez  YOB: 1940    Chief Complaint   Patient presents with     RECHECK     Follow up EMG / Question small fiber neurophathy / history of stroke       SUBJECTIVE:                                                      HISTORY OF PRESENT ILLNESS:  Bryson is here for follow up regarding neuropathy symptoms. I met him as a virtual visit about 2 months ago, for bilateral foot pain.    The patient has additional history of CAD, diabetes, hypertension, MAIDA, chronic LBP and stroke.  He is on Eliquis. His diabetes has been under good control recently.  A1c in August was 5.4%.    Romeo described burning pain in the feet (L>R), more bothersome at night.  He reported some stumbling with this but no falls.  He has arthritis in the hands.  He also has chronic problems with neck and back issues, for which he told me he sees the chiropractor.  I referred him on for nerve conduction studies/EMG.  He is here for an exam and to follow-up on the testing.    EMG was completed on 11.17.20.  The results showed no generalized neuropathy or evidence of radiculopathy in the lower limbs.    The patient tells me he still has the burning pain. It varies from Left to Right. He mentions that he does still have regular Podiatry appointments.  He describes burning on the bottoms of both feet.  Some pain on the tops of the feet as well, and medially.  He denies rash or dry eyes.  He does endorse dry mouth.  He says that his balance still is not real great.    He tells me that he used to drink alcohol heavily. He says he could \"drink with the best of them.\" He stopped drinking in his 70's.  He says he used to travel over to [place] to buy 100 proof alcohol.    He does endorse occasional stiffness and numbness in the hands.  He has his eyes checked regularly.  CURRENT MEDICATIONS:        acetaminophen (TYLENOL) 325 MG tablet, Take " 2 tablets (650 mg) by mouth every 4 hours as needed for other (surgical pain)       Apixaban (ELIQUIS PO), Take 5 mg by mouth 2 times daily        ASPIRIN ADULT LOW STRENGTH PO, Take 81 mg by mouth daily       Cholecalciferol (VITAMIN D3 PO), Take 5,000 Units by mouth twice a week On Sunday and Wednesday       losartan (COZAAR) 50 MG tablet, Take 25 mg by mouth daily        metoprolol succinate ER (TOPROL-XL) 25 MG 24 hr tablet, Take 25 mg by mouth daily       Multiple Vitamins-Minerals (MULTIVITAL PO), Take 1 tablet by mouth every evening        Multiple Vitamins-Minerals (PRESERVISION/LUTEIN PO), Take 1 capsule by mouth 2 times daily TEBS - Eye and Body Support       nitroglycerin (NITROSTAT) 0.4 MG SL tablet, Place 0.4 mg under the tongue every 5 minutes as needed for chest pain        Omega-3 Fatty Acids (OMEGA 3 PO), Take 1 capsule by mouth 2 times daily        omeprazole (PRILOSEC) 40 MG DR capsule, Take 40 mg by mouth daily Take twice daily until follow-up with primary care provider on Tues, likely return to once daily thereafter pending their recommendations       order for DME, At Bedtime CPAP machine for home use at pressure: 8-14 cmw , Heated humidifier x 1 q 5yr, water chamber x 1 q 6 mo, chin strap x 1 q 6 mo, Full Face Mask x 1 q 3mo, Full face cushion x 1 q mo, Heated PAP tubing x 1 q 3 mo, Headgear x 1 q 6 mo, non-disposable filters x 1 q 6 mo, disposable filter x 2 q mo;       oxybutynin ER (DITROPAN XL) 15 MG 24 hr tablet, Take 20 mg by mouth daily        polyethylene glycol (MIRALAX) 17 g packet, Take 1 packet by mouth daily       simvastatin (ZOCOR) 40 MG tablet, Take 40 mg by mouth At Bedtime    No current facility-administered medications on file prior to visit.       RECENT DIAGNOSTIC STUDIES:   Labs: No results found for any visits on 12/09/20.     Nerve conduction studies/EMG report will be scanned into the patient's chart.    REVIEW OF SYSTEMS:                                                       10-point review of systems is negative except as mentioned above in HPI.    EXAM:                                                      Physical Exam:   Vitals: BP (!) 155/83   Pulse 63   Temp 97.1  F (36.2  C)   BMI= There is no height or weight on file to calculate BMI.  GENERAL: NAD.  HEENT: NC/AT.  CV: RRR. S1, S2.   NECK: No bruits.  PULM: Non-labored breathing.   EXTR: Arthritic deformities in the hands bilaterally.  Neurologic:  MENTAL STATUS: Alert, attentive. Speech is fluent. Normal comprehension.  Normal concentration. Adequate fund of knowledge.   CRANIAL NERVES: Discs technically difficult to visualize. Visual fields intact to confrontation. Pupils equally, round and reactive to light. Facial sensation and movement normal. EOM full.  Slightly hard of hearing to conversation. Trapezius strength intact. Palate moves symmetrically. Tongue midline.  MOTOR: 5/5 in proximal and distal muscle groups of upper and lower extremities. Tone and bulk normal.   DTRs: Intact and symmetric in biceps, BR, triceps and patellae. Ankle jerks trace. Babinski down-going bilaterally.   SENSATION: Normal light touch and pinprick. Intact proprioception at great toes. Vibration: Slightly decreased at both ankles.   COORDINATION: Normal finger nose finger. Finger tapping normal. Knee heel shin normal.  STATION AND GAIT: Romberg negative. Good postural reflexes. Casual gait and tandem normal.  Right hand-dominant.     ASSESSMENT and PLAN:                                                      Assessment:    ICD-10-CM    1. Small fiber neuropathy  G62.9 Vitamin B12     Methylmalonic Acid     Folate     Protein electrophoresis     Anti Nuclear Dre IgG by IFA with Reflex     CRP inflammation     Erythrocyte sedimentation rate auto     Vitamin B6     Vitamin B1 whole blood     SSA Ro ORLANDO Antibody IgG     SSB La ORLANDO Antibody IgG     Tissue transglutaminase dre IgA and IgG     Protein Immunofixation Serum     Protein immunofixation  urine    possible, based on symptoms and normal EMG        Mr. Mendez is a pleasant 80-year-old man with well-controlled diabetes and remote history of heavy alcohol use.  He is here to follow-up on his nerve conduction study/EMG.  This was for bilateral burning and pain in the feet, and the study came back normal.  I explained to Romeo that this could mean that he is symptoms are due to a small fiber neuropathy, which is not picked up by the test. Skin biopsy is the confirmatory test for this condition.  If we want to avoid the biopsy, we can also just do labs for neuropathy, to look for an etiology.  Then, if there is a treatable condition, we can address that to avoid worsening of his neuropathic symptoms.  Romeo is open to doing the labs, and would like to wait on the biopsy for now.  We did discuss symptomatic management.  He is not sure if he needs anything at this time, but we will give him some information about gabapentin in case that changes.  I would like to see Romeo back in clinic in a few months for follow-up.  He understands and agrees with the plan.    Romeo to follow up with Primary Care provider regarding elevated blood pressure.    Plan:  -- Labs for small fiber neuropathy.  We will notify you of the results.  -- Consider an evening dose of Gabapentin for the burning/pain. *Information provided.  -- Return to Neurology clinic in 3-4 months.  Let us know if any concerns arise in the meantime.    Total Time: 25 minutes were spent with the patient and in chart review/documentation. More than 50% of the time spent on counseling (as described above in Assessment and Plan)/coordinating the care.    Tresa Carrillo MD  Neurology    Dragon software used in the dictation of this note.

## 2020-12-09 NOTE — LETTER
"    12/9/2020         RE: Bryson Mendez  [address]  [facility] [address]        Dear Colleague,    Thank you for referring your patient, Bryson Mendez, to the [facility] [place]. Please see a copy of my visit note below.    ESTABLISHED PATIENT NEUROLOGY NOTE    DATE OF VISIT: 12/9/2020  MRN: [id number]  PATIENT NAME: Bryson Mendez  YOB: 1940    Chief Complaint   Patient presents with     RECHECK     Follow up EMG / Question small fiber neurophathy / history of stroke       SUBJECTIVE:                                                      HISTORY OF PRESENT ILLNESS:  Bryson is here for follow up regarding neuropathy symptoms. I met him as a virtual visit about 2 months ago, for bilateral foot pain.    The patient has additional history of CAD, diabetes, hypertension, MAIDA, chronic LBP and stroke.  He is on Eliquis. His diabetes has been under good control recently.  A1c in August was 5.4%.    Romeo described burning pain in the feet (L>R), more bothersome at night.  He reported some stumbling with this but no falls.  He has arthritis in the hands.  He also has chronic problems with neck and back issues, for which he told me he sees the chiropractor.  I referred him on for nerve conduction studies/EMG.  He is here for an exam and to follow-up on the testing.    EMG was completed on 11.17.20.  The results showed no generalized neuropathy or evidence of radiculopathy in the lower limbs.    The patient tells me he still has the burning pain. It varies from Left to Right. He mentions that he does still have regular Podiatry appointments.  He describes burning on the bottoms of both feet.  Some pain on the tops of the feet as well, and medially.  He denies rash or dry eyes.  He does endorse dry mouth.  He says that his balance still is not real great.    He tells me that he used to drink alcohol heavily. He says he could \"drink with the best of them.\" He stopped " drinking in his 70's.  He says he used to travel over to [place] to buy 100 proof alcohol.    He does endorse occasional stiffness and numbness in the hands.  He has his eyes checked regularly.  CURRENT MEDICATIONS:        acetaminophen (TYLENOL) 325 MG tablet, Take 2 tablets (650 mg) by mouth every 4 hours as needed for other (surgical pain)       Apixaban (ELIQUIS PO), Take 5 mg by mouth 2 times daily        ASPIRIN ADULT LOW STRENGTH PO, Take 81 mg by mouth daily       Cholecalciferol (VITAMIN D3 PO), Take 5,000 Units by mouth twice a week On Sunday and Wednesday       losartan (COZAAR) 50 MG tablet, Take 25 mg by mouth daily        metoprolol succinate ER (TOPROL-XL) 25 MG 24 hr tablet, Take 25 mg by mouth daily       Multiple Vitamins-Minerals (MULTIVITAL PO), Take 1 tablet by mouth every evening        Multiple Vitamins-Minerals (PRESERVISION/LUTEIN PO), Take 1 capsule by mouth 2 times daily TEBS - Eye and Body Support       nitroglycerin (NITROSTAT) 0.4 MG SL tablet, Place 0.4 mg under the tongue every 5 minutes as needed for chest pain        Omega-3 Fatty Acids (OMEGA 3 PO), Take 1 capsule by mouth 2 times daily        omeprazole (PRILOSEC) 40 MG DR capsule, Take 40 mg by mouth daily Take twice daily until follow-up with primary care provider on Tues, likely return to once daily thereafter pending their recommendations       order for DME, At Bedtime CPAP machine for home use at pressure: 8-14 cmw , Heated humidifier x 1 q 5yr, water chamber x 1 q 6 mo, chin strap x 1 q 6 mo, Full Face Mask x 1 q 3mo, Full face cushion x 1 q mo, Heated PAP tubing x 1 q 3 mo, Headgear x 1 q 6 mo, non-disposable filters x 1 q 6 mo, disposable filter x 2 q mo;       oxybutynin ER (DITROPAN XL) 15 MG 24 hr tablet, Take 20 mg by mouth daily        polyethylene glycol (MIRALAX) 17 g packet, Take 1 packet by mouth daily       simvastatin (ZOCOR) 40 MG tablet, Take 40 mg by mouth At Bedtime    No current facility-administered  medications on file prior to visit.       RECENT DIAGNOSTIC STUDIES:   Labs: No results found for any visits on 12/09/20.     Nerve conduction studies/EMG report will be scanned into the patient's chart.    REVIEW OF SYSTEMS:                                                      10-point review of systems is negative except as mentioned above in HPI.    EXAM:                                                      Physical Exam:   Vitals: BP (!) 155/83   Pulse 63   Temp 97.1  F (36.2  C)   BMI= There is no height or weight on file to calculate BMI.  GENERAL: NAD.  HEENT: NC/AT.  CV: RRR. S1, S2.   NECK: No bruits.  PULM: Non-labored breathing.   EXTR: Arthritic deformities in the hands bilaterally.  Neurologic:  MENTAL STATUS: Alert, attentive. Speech is fluent. Normal comprehension.  Normal concentration. Adequate fund of knowledge.   CRANIAL NERVES: Discs technically difficult to visualize. Visual fields intact to confrontation. Pupils equally, round and reactive to light. Facial sensation and movement normal. EOM full.  Slightly hard of hearing to conversation. Trapezius strength intact. Palate moves symmetrically. Tongue midline.  MOTOR: 5/5 in proximal and distal muscle groups of upper and lower extremities. Tone and bulk normal.   DTRs: Intact and symmetric in biceps, BR, triceps and patellae. Ankle jerks trace. Babinski down-going bilaterally.   SENSATION: Normal light touch and pinprick. Intact proprioception at great toes. Vibration: Slightly decreased at both ankles.   COORDINATION: Normal finger nose finger. Finger tapping normal. Knee heel shin normal.  STATION AND GAIT: Romberg negative. Good postural reflexes. Casual gait and tandem normal.  Right hand-dominant.     ASSESSMENT and PLAN:                                                      Assessment:    ICD-10-CM    1. Small fiber neuropathy  G62.9 Vitamin B12     Methylmalonic Acid     Folate     Protein electrophoresis     Anti Nuclear Chantal IgG by IFA  with Reflex     CRP inflammation     Erythrocyte sedimentation rate auto     Vitamin B6     Vitamin B1 whole blood     SSA Ro ORLANDO Antibody IgG     SSB La ORLANDO Antibody IgG     Tissue transglutaminase dre IgA and IgG     Protein Immunofixation Serum     Protein immunofixation urine    possible, based on symptoms and normal EMG        Mr. Mendez is a pleasant 80-year-old man with well-controlled diabetes and remote history of heavy alcohol use.  He is here to follow-up on his nerve conduction study/EMG.  This was for bilateral burning and pain in the feet, and the study came back normal.  I explained to Romeo that this could mean that he is symptoms are due to a small fiber neuropathy, which is not picked up by the test. Skin biopsy is the confirmatory test for this condition.  If we want to avoid the biopsy, we can also just do labs for neuropathy, to look for an etiology.  Then, if there is a treatable condition, we can address that to avoid worsening of his neuropathic symptoms.  Romeo is open to doing the labs, and would like to wait on the biopsy for now.  We did discuss symptomatic management.  He is not sure if he needs anything at this time, but we will give him some information about gabapentin in case that changes.  I would like to see Romeo back in clinic in a few months for follow-up.  He understands and agrees with the plan.    Plan:  -- Labs for small fiber neuropathy.  We will notify you of the results.  -- Consider an evening dose of Gabapentin for the burning/pain. *Information provided.  -- Return to Neurology clinic in 3-4 months.  Let us know if any concerns arise in the meantime.    Total Time: 25 minutes were spent with the patient and in chart review/documentation. More than 50% of the time spent on counseling (as described above in Assessment and Plan)/coordinating the care.    Tresa Carrillo MD  Neurology    Dragon software used in the dictation of this  note.                    Again, thank you for allowing me to participate in the care of your patient.        Sincerely,        Tresa Carrillo MD

## 2020-12-10 LAB
ALBUMIN SERPL ELPH-MCNC: 4.3 G/DL (ref 3.7–5.1)
ALPHA1 GLOB SERPL ELPH-MCNC: 0.3 G/DL (ref 0.2–0.4)
ALPHA2 GLOB SERPL ELPH-MCNC: 0.8 G/DL (ref 0.5–0.9)
ANA SER QL IF: NEGATIVE
B-GLOBULIN SERPL ELPH-MCNC: 0.7 G/DL (ref 0.6–1)
ENA SS-A IGG SER IA-ACNC: <0.2 AI (ref 0–0.9)
ENA SS-B IGG SER IA-ACNC: <0.2 AI (ref 0–0.9)
GAMMA GLOB SERPL ELPH-MCNC: 1 G/DL (ref 0.7–1.6)
IGA SERPL-MCNC: 295 MG/DL (ref 84–499)
IGG SERPL-MCNC: 1116 MG/DL (ref 610–1616)
IGM SERPL-MCNC: 32 MG/DL (ref 35–242)
M PROTEIN SERPL ELPH-MCNC: 0.1 G/DL
PROT ELPH PNL UR ELPH: NORMAL
PROT PATTERN SERPL ELPH-IMP: ABNORMAL
PROT PATTERN SERPL IFE-IMP: ABNORMAL
TTG IGA SER-ACNC: 1 U/ML
TTG IGG SER-ACNC: 1 U/ML

## 2020-12-12 LAB
VIT B1 BLD-MCNC: 224 NMOL/L (ref 70–180)
VIT B6 SERPL-MCNC: 143 NMOL/L (ref 20–125)

## 2020-12-15 NOTE — RESULT ENCOUNTER NOTE
Please advise Bryson JOSHUA Mendez,  1940, that his lab results have come back largely unremarkable. His vitamin B1 and B6 levels were a little elevated. High vitamin B6 can sometimes contribute to neuropathy symptoms, so if he is taking a vitamin B complex with B6 in it I recommend he pull back to every other day.  Otherwise this is not really anything to be concerned about.  I am still awaiting 1 additional lab test and will notify him if this is abnormal.  [phone] (home) none (work)    Thank you,  Tresa Carrillo MD

## 2020-12-23 LAB — METHYLMALONATE SERPL-SCNC: 0.24 UMOL/L (ref 0–0.4)

## 2021-03-10 ENCOUNTER — IMMUNIZATION (OUTPATIENT)
Dept: FAMILY MEDICINE | Facility: CLINIC | Age: 81
End: 2021-03-10
Payer: MEDICARE

## 2021-03-10 PROCEDURE — 0011A PR COVID VAC MODERNA 100 MCG/0.5 ML IM: CPT

## 2021-03-10 PROCEDURE — 91301 PR COVID VAC MODERNA 100 MCG/0.5 ML IM: CPT

## 2021-04-07 ENCOUNTER — IMMUNIZATION (OUTPATIENT)
Dept: FAMILY MEDICINE | Facility: CLINIC | Age: 81
End: 2021-04-07
Attending: FAMILY MEDICINE
Payer: MEDICARE

## 2021-04-07 PROCEDURE — 0012A PR COVID VAC MODERNA 100 MCG/0.5 ML IM: CPT

## 2021-04-07 PROCEDURE — 91301 PR COVID VAC MODERNA 100 MCG/0.5 ML IM: CPT

## 2021-07-09 NOTE — PLAN OF CARE
Problem: Patient Care Overview  Goal: Plan of Care/Patient Progress Review  [facility] DISCHARGE NOTE    Patient discharged to home at 2:10 PM via wheel chair. Accompanied by spouse and staff. Discharge instructions reviewed with patient and spouse, opportunity offered to ask questions. Prescriptions - None ordered for discharge. All belongings sent with patient.    Bernarda Marino       Discharged

## 2022-02-10 NOTE — ADDENDUM NOTE
Encounter addended by: Sally Puri, PT on: 10/19/2018 11:31 AM<BR>     Actions taken: Sign clinical note, Flowsheet accepted, Episode resolved Pharmacy faxed in a request for prior authorization on:    Medication: Levemir  Dosage: 100 Unit/ML  Quantity requested:  70 ml  Pharmacy for prescription has been selected. Prior authorization request has been initiated and sent for completion.

## 2024-07-22 NOTE — PROGRESS NOTES
Bryson Mendez is a 83 year old male  Chief Complaint: hearing loss, for hearing aid clearance. Decline in discrimination score was detected at [facility]  History of Present Illness  Location:ears  Quality:hearing   Severity:moderate to severe  Duration: since last year    Past Medical History -   Patient Active Problem List   Diagnosis    Osteoarthritis of hip    CAD (coronary artery disease)    Pre-diabetes    MAIDA (obstructive sleep apnea)    A-fib (H)    Walls's esophagus without dysplasia    Failed total hip arthroplasty  (H24)    Benign essential hypertension    Benign non-nodular prostatic hyperplasia without lower urinary tract symptoms    Diabetes mellitus type 2 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 (H)    Acute myocardial infarction of anterolateral wall (H)    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)    Mixed hyperlipidemia    S/P PTCA (percutaneous transluminal coronary angioplasty)    S/P hip replacement    Closed right hip fracture (H)    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)    Pre-syncope    History of stroke    Neck pain    Near syncope    Syncope    Chest pain       Current Medications -   Current Outpatient Medications:     acetaminophen (TYLENOL) 325 MG tablet, Take 2 tablets (650 mg) by mouth every 4 hours as needed for other (surgical pain), Disp: 100 tablet, Rfl: 0    Apixaban (ELIQUIS PO), Take 5 mg by mouth 2 times daily , Disp: , Rfl:     ASPIRIN ADULT LOW STRENGTH PO, Take 81 mg by mouth daily, Disp: , Rfl:     Cholecalciferol (VITAMIN D3 PO), Take 5,000 Units by mouth twice a week On Sunday and Wednesday, Disp: , Rfl:     losartan (COZAAR) 50 MG tablet, Take 25 mg by mouth daily , Disp: , Rfl:     metoprolol succinate ER (TOPROL-XL) 25 MG 24 hr tablet, Take 25 mg by mouth daily, Disp: , Rfl:     Multiple Vitamins-Minerals (MULTIVITAL PO), Take 1 tablet by mouth every evening , Disp: , Rfl:     Multiple Vitamins-Minerals (PRESERVISION/LUTEIN PO), Take 1 capsule by  mouth 2 times daily TEBS - Eye and Body Support, Disp: , Rfl:     nitroglycerin (NITROSTAT) 0.4 MG SL tablet, Place 0.4 mg under the tongue every 5 minutes as needed for chest pain , Disp: 25 tablet, Rfl:     Omega-3 Fatty Acids (OMEGA 3 PO), Take 1 capsule by mouth 2 times daily , Disp: , Rfl:     omeprazole (PRILOSEC) 40 MG DR capsule, Take 40 mg by mouth daily Take twice daily until follow-up with primary care provider on Tues, likely return to once daily thereafter pending their recommendations, Disp: , Rfl:     order for DME, At Bedtime CPAP machine for home use at pressure: 8-14 cmw , Heated humidifier x 1 q 5yr, water chamber x 1 q 6 mo, chin strap x 1 q 6 mo, Full Face Mask x 1 q 3mo, Full face cushion x 1 q mo, Heated PAP tubing x 1 q 3 mo, Headgear x 1 q 6 mo, non-disposable filters x 1 q 6 mo, disposable filter x 2 q mo;, Disp: , Rfl:     oxybutynin ER (DITROPAN XL) 15 MG 24 hr tablet, Take 20 mg by mouth daily , Disp: , Rfl:     polyethylene glycol (MIRALAX) 17 g packet, Take 1 packet by mouth daily, Disp: , Rfl:     simvastatin (ZOCOR) 40 MG tablet, Take 40 mg by mouth At Bedtime, Disp: , Rfl:     Allergies -   Allergies   Allergen Reactions    Lisinopril Difficulty breathing     Fever, Redness       Social History -   Social History     Socioeconomic History    Marital status:    Tobacco Use    Smoking status: Former     Current packs/day: 0.00     Types: Cigarettes     Quit date: 1971     Years since quittin.3    Smokeless tobacco: Never   Substance and Sexual Activity    Alcohol use: Yes     Comment: occasional - 2-3 drinks per month    Drug use: No   Social History Narrative    , lives with his wife.       Family History -   Family History   Problem Relation Age of Onset    Coronary Artery Disease Father         MI    Colon Cancer Mother     Cancer Brother     Rheumatoid Arthritis Sister     Heart Disease Sister        Review of Systems:   !.  Weight Loss: No   2. Difficulty  Breathing: No   3. Difficulty Swallowing: No   4. Pain: No    Physical Exam  B/P: Data Unavailable, T: Data Unavailable, P: Data Unavailable, R: Data Unavailable  Vitals: There were no vitals taken for this visit.  BMI= There is no height or weight on file to calculate BMI.    General  Appearance - Normal  Head/Face/Scalp:    Skin - Normal    Facial Palpation - Normal    Facial Strength - Normal  Ears:    Pinna - Normal    Canal - Normal   Tympanic membrane - Normal  Nose:    External - Normal    Septum - large anterior perforation    Turbinates - Normal    Middle meatus - Normal  Oral Cavity:    Lips - Normal    Floor of Mouth - Normal    Gingiva - Normal    Tongue - Normal    Buccal - Normal    Palate - Normal  Nasopharynx:    Oropharynx:    Tonsils - Normal    Tongue base - Normal    Soft palate - palatoplasty    Posterior pharyngeal wall - Normal  Hypopharynx:  Larynx:    Epiglottis -     Aryepiglottic folds -     Arytenoids -     False vocal cords -     True vocal cords -  Neck Masses - No  Neck lymphatics - no lymphadenopathy  Thyroid - Normal  Salivary glands - Normal    Audiogram -   Word Recognition Score:     RIGHT: Did not retest    LEFT:   68% at 85 dB HL using NU-6 recorded word list.              NOTE: Only the word recognition was retested with the same values as the 2023 test to show non-significant decline in the left ear.      Radiology - not applicable   Reports:   View films:  Procedures - not applicable  Patient Education:     A/P - Bryson Mendez is a 83 year old male  Medical Decision Making hearing loss, left ear  Decline in discrimination score not significant  Cleared for amplification

## 2024-07-25 ENCOUNTER — OFFICE VISIT (OUTPATIENT)
Dept: OTOLARYNGOLOGY | Facility: CLINIC | Age: 84
End: 2024-07-25
Payer: COMMERCIAL

## 2024-07-25 ENCOUNTER — OFFICE VISIT (OUTPATIENT)
Dept: AUDIOLOGY | Facility: CLINIC | Age: 84
End: 2024-07-25
Payer: COMMERCIAL

## 2024-07-25 VITALS
HEART RATE: 61 BPM | WEIGHT: 170 LBS | BODY MASS INDEX: 24.39 KG/M2 | DIASTOLIC BLOOD PRESSURE: 74 MMHG | SYSTOLIC BLOOD PRESSURE: 167 MMHG | TEMPERATURE: 97.8 F

## 2024-07-25 DIAGNOSIS — H90.3 SENSORY HEARING LOSS, BILATERAL: ICD-10-CM

## 2024-07-25 DIAGNOSIS — H90.3 SENSORY HEARING LOSS, BILATERAL: Primary | ICD-10-CM

## 2024-07-25 DIAGNOSIS — H90.3 SENSORINEURAL HEARING LOSS, BILATERAL: Primary | ICD-10-CM

## 2024-07-25 PROCEDURE — 99203 OFFICE O/P NEW LOW 30 MIN: CPT | Performed by: OTOLARYNGOLOGY

## 2024-07-25 PROCEDURE — 92555 SPEECH THRESHOLD AUDIOMETRY: CPT | Mod: 52 | Performed by: AUDIOLOGIST

## 2024-07-25 PROCEDURE — 92550 TYMPANOMETRY & REFLEX THRESH: CPT | Performed by: AUDIOLOGIST

## 2024-07-25 ASSESSMENT — PAIN SCALES - GENERAL: PAINLEVEL: NO PAIN (0)

## 2024-07-25 NOTE — NURSING NOTE
"Initial BP (!) 167/74 (BP Location: Right arm, Patient Position: Chair, Cuff Size: Adult Regular)   Pulse 61   Temp 97.8  F (36.6  C) (Tympanic)   Wt 77.1 kg (170 lb)   BMI 24.39 kg/m   Estimated body mass index is 24.39 kg/m  as calculated from the following:    Height as of 9/5/20: 1.778 m (5' 10\").    Weight as of this encounter: 77.1 kg (170 lb). .  Nelida Schneider LPN    "

## 2024-07-25 NOTE — PROGRESS NOTES
AUDIOLOGY REPORT:    Patient was referred to [facility] from ENT by Dr. Khan for a hearing examination. Patient reports they have been sent by [employer] for a significant decline in word recognition scores in the left ear when compared to the 2023 test. The left ear word recognition score in 2023 was 80% presented at 85 dB HL. The 2024 testing showed 26% at a presentation of 75 dB HL. They were accompanied today by their wife.    Testing:    Otoscopy:   Otoscopic exam indicates ears are clear of cerumen bilaterally  NOTE: there is some dried blood in the canal of the left ear.     Tympanograms:    RIGHT: normal eardrum mobility     LEFT:   normal eardrum mobility    Reflexes (reported by stimulus ear): 1000 Hz  RIGHT: Ipsilateral is elevated  RIGHT: Contralateral is absent at frequencies tested  LEFT:   Ipsilateral is absent at frequencies tested  LEFT:   Contralateral is present at normal levels    Thresholds:   Pure Tone Thresholds were not reassessed as there was only one frequency (250 Hz) between the two [employer] tests that declined significantly in one ear.     Speech Reception Threshold:   Did not retest    Word Recognition Score:     RIGHT: Did not retest    LEFT:   68% at 85 dB HL using NU-6 recorded word list.   NOTE: Only the word recognition was retested with the same values as the 2023 test to show non-significant decline in the left ear.     Discussed results with the patient and his wife.     Patient was returned to ENT for follow up.     Vimal Mayers CCC-A  Licensed Audiologist  7/25/2024

## 2024-07-25 NOTE — LETTER
7/25/2024      Bryson Mendez  [address] [address] [facility] [id number]      Dear Colleague,    Thank you for referring your patient, Bryson Mendez, to the [facility]. Please see a copy of my visit note below.    Bryson Mendez is a 83 year old male  Chief Complaint: hearing loss, for hearing aid clearance. Decline in discrimination score was detected at [facility]  History of Present Illness  Location:ears  Quality:hearing   Severity:moderate to severe  Duration: since last year    Past Medical History -   Patient Active Problem List   Diagnosis     Osteoarthritis of hip     CAD (coronary artery disease)     Pre-diabetes     MAIDA (obstructive sleep apnea)     A-fib (H)     Walls's esophagus without dysplasia     Failed total hip arthroplasty  (H24)     Benign essential hypertension     Benign non-nodular prostatic hyperplasia without lower urinary tract symptoms     Diabetes mellitus type 2 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 (H)     Acute myocardial infarction of anterolateral wall (H)     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)     Mixed hyperlipidemia     S/P PTCA (percutaneous transluminal coronary angioplasty)     S/P hip replacement     Closed right hip fracture (H)     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)     Pre-syncope     History of stroke     Neck pain     Near syncope     Syncope     Chest pain       Current Medications -   Current Outpatient Medications:      acetaminophen (TYLENOL) 325 MG tablet, Take 2 tablets (650 mg) by mouth every 4 hours as needed for other (surgical pain), Disp: 100 tablet, Rfl: 0     Apixaban (ELIQUIS PO), Take 5 mg by mouth 2 times daily , Disp: , Rfl:      ASPIRIN ADULT LOW STRENGTH PO, Take 81 mg by mouth daily, Disp: , Rfl:      Cholecalciferol (VITAMIN D3 PO), Take 5,000 Units by mouth twice a week On Sunday and Wednesday, Disp: , Rfl:      losartan (COZAAR) 50 MG tablet, Take 25 mg by mouth daily , Disp: , Rfl:       metoprolol succinate ER (TOPROL-XL) 25 MG 24 hr tablet, Take 25 mg by mouth daily, Disp: , Rfl:      Multiple Vitamins-Minerals (MULTIVITAL PO), Take 1 tablet by mouth every evening , Disp: , Rfl:      Multiple Vitamins-Minerals (PRESERVISION/LUTEIN PO), Take 1 capsule by mouth 2 times daily TEBS - Eye and Body Support, Disp: , Rfl:      nitroglycerin (NITROSTAT) 0.4 MG SL tablet, Place 0.4 mg under the tongue every 5 minutes as needed for chest pain , Disp: 25 tablet, Rfl:      Omega-3 Fatty Acids (OMEGA 3 PO), Take 1 capsule by mouth 2 times daily , Disp: , Rfl:      omeprazole (PRILOSEC) 40 MG DR capsule, Take 40 mg by mouth daily Take twice daily until follow-up with primary care provider on Tues, likely return to once daily thereafter pending their recommendations, Disp: , Rfl:      order for DME, At Bedtime CPAP machine for home use at pressure: 8-14 cmw , Heated humidifier x 1 q 5yr, water chamber x 1 q 6 mo, chin strap x 1 q 6 mo, Full Face Mask x 1 q 3mo, Full face cushion x 1 q mo, Heated PAP tubing x 1 q 3 mo, Headgear x 1 q 6 mo, non-disposable filters x 1 q 6 mo, disposable filter x 2 q mo;, Disp: , Rfl:      oxybutynin ER (DITROPAN XL) 15 MG 24 hr tablet, Take 20 mg by mouth daily , Disp: , Rfl:      polyethylene glycol (MIRALAX) 17 g packet, Take 1 packet by mouth daily, Disp: , Rfl:      simvastatin (ZOCOR) 40 MG tablet, Take 40 mg by mouth At Bedtime, Disp: , Rfl:     Allergies -   Allergies   Allergen Reactions     Lisinopril Difficulty breathing     Fever, Redness       Social History -   Social History     Socioeconomic History     Marital status:    Tobacco Use     Smoking status: Former     Current packs/day: 0.00     Types: Cigarettes     Quit date: 1971     Years since quittin.3     Smokeless tobacco: Never   Substance and Sexual Activity     Alcohol use: Yes     Comment: occasional - 2-3 drinks per month     Drug use: No   Social History Narrative    , lives with his  wife.       Family History -   Family History   Problem Relation Age of Onset     Coronary Artery Disease Father         MI     Colon Cancer Mother      Cancer Brother      Rheumatoid Arthritis Sister      Heart Disease Sister        Review of Systems:   !.  Weight Loss: No   2. Difficulty Breathing: No   3. Difficulty Swallowing: No   4. Pain: No    Physical Exam  B/P: Data Unavailable, T: Data Unavailable, P: Data Unavailable, R: Data Unavailable  Vitals: There were no vitals taken for this visit.  BMI= There is no height or weight on file to calculate BMI.    General  Appearance - Normal  Head/Face/Scalp:    Skin - Normal    Facial Palpation - Normal    Facial Strength - Normal  Ears:    Pinna - Normal    Canal - Normal   Tympanic membrane - Normal  Nose:    External - Normal    Septum - large anterior perforation    Turbinates - Normal    Middle meatus - Normal  Oral Cavity:    Lips - Normal    Floor of Mouth - Normal    Gingiva - Normal    Tongue - Normal    Buccal - Normal    Palate - Normal  Nasopharynx:    Oropharynx:    Tonsils - Normal    Tongue base - Normal    Soft palate - palatoplasty    Posterior pharyngeal wall - Normal  Hypopharynx:  Larynx:    Epiglottis -     Aryepiglottic folds -     Arytenoids -     False vocal cords -     True vocal cords -  Neck Masses - No  Neck lymphatics - no lymphadenopathy  Thyroid - Normal  Salivary glands - Normal    Audiogram -   Word Recognition Score:     RIGHT: Did not retest    LEFT:   68% at 85 dB HL using NU-6 recorded word list.              NOTE: Only the word recognition was retested with the same values as the 2023 test to show non-significant decline in the left ear.      Radiology - not applicable   Reports:   View films:  Procedures - not applicable  Patient Education:     A/P - Bryson Mendez is a 83 year old male  Medical Decision Making hearing loss, left ear  Decline in discrimination score not significant  Cleared for amplification      Again, thank  you for allowing me to participate in the care of your patient.        Sincerely,        Armaan Khan MD

## 2024-07-25 NOTE — PROGRESS NOTES
Bryson Mendez is a 83 year old male  Chief Complaint: hearing loss, for hearing aid clearance. Decline in discrimination score was detected at [facility]  History of Present Illness  Location:ears  Quality:hearing   Severity:moderate to severe  Duration: since last year    Past Medical History -   Patient Active Problem List   Diagnosis    Osteoarthritis of hip    CAD (coronary artery disease)    Pre-diabetes    MAIDA (obstructive sleep apnea)    A-fib (H)    Walls's esophagus without dysplasia    Failed total hip arthroplasty  (H24)    Benign essential hypertension    Benign non-nodular prostatic hyperplasia without lower urinary tract symptoms    Diabetes mellitus type 2 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 (H)    Acute myocardial infarction of anterolateral wall (H)    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)    Mixed hyperlipidemia    S/P PTCA (percutaneous transluminal coronary angioplasty)    S/P hip replacement    Closed right hip fracture (H)    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)    Pre-syncope    History of stroke    Neck pain    Near syncope    Syncope    Chest pain       Current Medications -   Current Outpatient Medications:     Apixaban (ELIQUIS PO), Take 5 mg by mouth 2 times daily , Disp: , Rfl:     ASPIRIN ADULT LOW STRENGTH PO, Take 81 mg by mouth daily, Disp: , Rfl:     Cholecalciferol (VITAMIN D3 PO), Take 5,000 Units by mouth twice a week On Sunday and Wednesday, Disp: , Rfl:     losartan (COZAAR) 50 MG tablet, Take 25 mg by mouth daily , Disp: , Rfl:     metoprolol succinate ER (TOPROL-XL) 25 MG 24 hr tablet, Take 25 mg by mouth daily, Disp: , Rfl:     Multiple Vitamins-Minerals (MULTIVITAL PO), Take 1 tablet by mouth every evening , Disp: , Rfl:     Multiple Vitamins-Minerals (PRESERVISION/LUTEIN PO), Take 1 capsule by mouth 2 times daily TEBS - Eye and Body Support, Disp: , Rfl:     Omega-3 Fatty Acids (OMEGA 3 PO), Take 1 capsule by mouth 2 times daily , Disp: ,  Rfl:     omeprazole (PRILOSEC) 40 MG DR capsule, Take 40 mg by mouth daily Take twice daily until follow-up with primary care provider on Tues, likely return to once daily thereafter pending their recommendations, Disp: , Rfl:     oxybutynin ER (DITROPAN XL) 15 MG 24 hr tablet, Take 20 mg by mouth daily , Disp: , Rfl:     polyethylene glycol (MIRALAX) 17 g packet, Take 1 packet by mouth daily, Disp: , Rfl:     simvastatin (ZOCOR) 40 MG tablet, Take 40 mg by mouth At Bedtime, Disp: , Rfl:     acetaminophen (TYLENOL) 325 MG tablet, Take 2 tablets (650 mg) by mouth every 4 hours as needed for other (surgical pain), Disp: 100 tablet, Rfl: 0    nitroglycerin (NITROSTAT) 0.4 MG SL tablet, Place 0.4 mg under the tongue every 5 minutes as needed for chest pain  (Patient not taking: Reported on 2024), Disp: 25 tablet, Rfl:     order for DME, At Bedtime CPAP machine for home use at pressure: 8-14 cmw , Heated humidifier x 1 q 5yr, water chamber x 1 q 6 mo, chin strap x 1 q 6 mo, Full Face Mask x 1 q 3mo, Full face cushion x 1 q mo, Heated PAP tubing x 1 q 3 mo, Headgear x 1 q 6 mo, non-disposable filters x 1 q 6 mo, disposable filter x 2 q mo;, Disp: , Rfl:     Allergies -   Allergies   Allergen Reactions    Lisinopril Difficulty breathing     Fever, Redness       Social History -   Social History     Socioeconomic History    Marital status:    Tobacco Use    Smoking status: Former     Current packs/day: 0.00     Types: Cigarettes     Quit date: 1971     Years since quittin.3    Smokeless tobacco: Never   Substance and Sexual Activity    Alcohol use: Yes     Comment: occasional - 2-3 drinks per month    Drug use: No   Social History Narrative    , lives with his wife.       Family History -   Family History   Problem Relation Age of Onset    Coronary Artery Disease Father         MI    Colon Cancer Mother     Cancer Brother     Rheumatoid Arthritis Sister     Heart Disease Sister        Review of  Systems:   !.  Weight Loss: No   2. Difficulty Breathing: No   3. Difficulty Swallowing: No   4. Pain: No    Physical Exam  B/P: Data Unavailable, T: Data Unavailable, P: Data Unavailable, R: Data Unavailable  Vitals: BP (!) 167/74 (BP Location: Right arm, Patient Position: Chair, Cuff Size: Adult Regular)   Pulse 61   Temp 97.8  F (36.6  C) (Tympanic)   Wt 77.1 kg (170 lb)   BMI 24.39 kg/m    BMI= Body mass index is 24.39 kg/m .    General  Appearance - Normal  Head/Face/Scalp:    Skin - Normal    Facial Palpation - Normal    Facial Strength - Normal  Ears:    Pinna - Normal    Canal - Normal   Tympanic membrane - Normal  Nose:    External - Normal    Septum - large anterior perforation    Turbinates - Normal    Middle meatus - Normal  Oral Cavity:    Lips - Normal    Floor of Mouth - Normal    Gingiva - Normal    Tongue - Normal    Buccal - Normal    Palate - Normal  Nasopharynx:    Oropharynx:    Tonsils - Normal    Tongue base - Normal    Soft palate - palatoplasty    Posterior pharyngeal wall - Normal  Hypopharynx:  Larynx:    Epiglottis -     Aryepiglottic folds -     Arytenoids -     False vocal cords -     True vocal cords -  Neck Masses - No  Neck lymphatics - no lymphadenopathy  Thyroid - Normal  Salivary glands - Normal    Audiogram -   Word Recognition Score:     RIGHT: Did not retest    LEFT:   68% at 85 dB HL using NU-6 recorded word list.              NOTE: Only the word recognition was retested with the same values as the 2023 test to show non-significant decline in the left ear.      Radiology - not applicable   Reports:   View films:  Procedures - not applicable  Patient Education:     A/P - Brysonmike Mendez is a 83 year old male  Medical Decision Making hearing loss, left ear  Decline in discrimination score not significant  Cleared for amplification

## (undated) DEVICE — BLANKET BAIR HUGGER UPPER BODY 42268

## (undated) DEVICE — SOL NACL 0.9% IRRIG 3000ML BAG 07972-08

## (undated) DEVICE — STOCKING SLEEVE COMPRESSION CALF LG

## (undated) DEVICE — SUCTION IRR SYSTEM W/TIP INTERPULSE

## (undated) DEVICE — GLOVE PROTEXIS W/NEU-THERA 9.0  2D73TE90

## (undated) DEVICE — PACK TOTAL HIP W/POUCH RIVERSIDE LATEX FREE

## (undated) DEVICE — SU WND CLOSURE VLOC 180 ABS 2-0 24" GS-21 VLOCL0335

## (undated) DEVICE — GOWN LG DISP 9515

## (undated) DEVICE — GLOVE PROTEXIS BLUE W/NEU-THERA 9.0  2D73EB90

## (undated) DEVICE — PREP DURAPREP 26ML APL 8630

## (undated) DEVICE — SOL NACL 0.9% IRRIG 1000ML BOTTLE 07138-09

## (undated) DEVICE — GLOVE PROTEXIS W/NEU-THERA 8.0  2D73TE80

## (undated) DEVICE — SU DERMABOND PRINEO 22CM CLR222US

## (undated) DEVICE — SU VICRYL 1 CT-1 36" UND J947H

## (undated) DEVICE — DRSG STERI STRIP 1X5" R1548

## (undated) DEVICE — BONE CLEANING TIP INTERPULSE  0210-010-000

## (undated) DEVICE — SU VICRYL 2-0 CT-1 36" UND J945H

## (undated) DEVICE — DRAPE STERI TOWEL SM 1000

## (undated) DEVICE — SU MONOCRYL 3-0 PS-2 18" UND Y497G

## (undated) DEVICE — BLADE SAW SAGITTAL STRK 31X63X0.64MM HD 4/2000 2108-140-006

## (undated) DEVICE — TAPE CLOTH ADHESIVE 3" ZONAS

## (undated) DEVICE — GLOVE PROTEXIS BLUE W/NEU-THERA 7.0  2D73EB70

## (undated) DEVICE — DRAPE SHEET REV FOLD 3/4 9349

## (undated) DEVICE — SU WND CLOSURE VLOC 90 ABS 3-0 18" P-14 VLOCM0124

## (undated) DEVICE — ESU ELEC BLADE 4" COATED

## (undated) DEVICE — DRAPE IOBAN LG .375X23.5" 6648EZ

## (undated) RX ORDER — GLYCOPYRROLATE 0.2 MG/ML
INJECTION, SOLUTION INTRAMUSCULAR; INTRAVENOUS
Status: DISPENSED
Start: 2018-01-19

## (undated) RX ORDER — FENTANYL CITRATE 50 UG/ML
INJECTION, SOLUTION INTRAMUSCULAR; INTRAVENOUS
Status: DISPENSED
Start: 2018-01-19

## (undated) RX ORDER — LIDOCAINE HYDROCHLORIDE 10 MG/ML
INJECTION, SOLUTION EPIDURAL; INFILTRATION; INTRACAUDAL; PERINEURAL
Status: DISPENSED
Start: 2018-01-19

## (undated) RX ORDER — ETOMIDATE 2 MG/ML
INJECTION INTRAVENOUS
Status: DISPENSED
Start: 2018-04-25

## (undated) RX ORDER — EPHEDRINE SULFATE 50 MG/ML
INJECTION, SOLUTION INTRAVENOUS
Status: DISPENSED
Start: 2018-01-19

## (undated) RX ORDER — PROPOFOL 10 MG/ML
INJECTION, EMULSION INTRAVENOUS
Status: DISPENSED
Start: 2018-01-19

## (undated) RX ORDER — ONDANSETRON 2 MG/ML
INJECTION INTRAMUSCULAR; INTRAVENOUS
Status: DISPENSED
Start: 2018-01-19

## (undated) RX ORDER — NEOSTIGMINE METHYLSULFATE 1 MG/ML
VIAL (ML) INJECTION
Status: DISPENSED
Start: 2018-01-19

## (undated) RX ORDER — GABAPENTIN 300 MG/1
CAPSULE ORAL
Status: DISPENSED
Start: 2018-01-19

## (undated) RX ORDER — GABAPENTIN 100 MG/1
CAPSULE ORAL
Status: DISPENSED
Start: 2018-01-19

## (undated) RX ORDER — LIDOCAINE HYDROCHLORIDE 10 MG/ML
INJECTION, SOLUTION EPIDURAL; INFILTRATION; INTRACAUDAL; PERINEURAL
Status: DISPENSED
Start: 2018-04-25

## (undated) RX ORDER — PHENYLEPHRINE HCL IN 0.9% NACL 1 MG/10 ML
SYRINGE (ML) INTRAVENOUS
Status: DISPENSED
Start: 2018-01-19

## (undated) RX ORDER — KETOROLAC TROMETHAMINE 30 MG/ML
INJECTION, SOLUTION INTRAMUSCULAR; INTRAVENOUS
Status: DISPENSED
Start: 2018-01-19

## (undated) RX ORDER — OXYCODONE HCL 10 MG/1
TABLET, FILM COATED, EXTENDED RELEASE ORAL
Status: DISPENSED
Start: 2018-01-19

## (undated) RX ORDER — DEXAMETHASONE SODIUM PHOSPHATE 4 MG/ML
INJECTION, SOLUTION INTRA-ARTICULAR; INTRALESIONAL; INTRAMUSCULAR; INTRAVENOUS; SOFT TISSUE
Status: DISPENSED
Start: 2018-01-19